# Patient Record
Sex: FEMALE | Race: WHITE | NOT HISPANIC OR LATINO | Employment: FULL TIME | URBAN - NONMETROPOLITAN AREA
[De-identification: names, ages, dates, MRNs, and addresses within clinical notes are randomized per-mention and may not be internally consistent; named-entity substitution may affect disease eponyms.]

---

## 2023-02-04 PROBLEM — R53.83 FATIGUE: Status: ACTIVE | Noted: 2023-02-04

## 2023-02-04 PROBLEM — I10 BENIGN ESSENTIAL HYPERTENSION: Status: ACTIVE | Noted: 2023-02-04

## 2023-02-04 PROBLEM — M25.50 JOINT PAIN: Status: ACTIVE | Noted: 2023-02-04

## 2023-02-04 PROBLEM — R10.9 FLANK PAIN: Status: ACTIVE | Noted: 2023-02-04

## 2023-02-04 PROBLEM — M47.814 THORACIC SPONDYLOSIS: Status: ACTIVE | Noted: 2023-02-04

## 2023-02-04 PROBLEM — G47.33 OSA (OBSTRUCTIVE SLEEP APNEA): Status: ACTIVE | Noted: 2023-02-04

## 2023-02-04 PROBLEM — F41.8 DEPRESSION WITH ANXIETY: Status: ACTIVE | Noted: 2023-02-04

## 2023-02-04 PROBLEM — R06.02 SHORTNESS OF BREATH ON EXERTION: Status: ACTIVE | Noted: 2023-02-04

## 2023-02-04 PROBLEM — G47.00 INSOMNIA: Status: ACTIVE | Noted: 2023-02-04

## 2023-02-04 PROBLEM — M54.9 BACK PAIN: Status: ACTIVE | Noted: 2023-02-04

## 2023-02-04 PROBLEM — R29.898 LEG WEAKNESS: Status: ACTIVE | Noted: 2023-02-04

## 2023-02-04 PROBLEM — K57.32 DIVERTICULITIS, COLON: Status: ACTIVE | Noted: 2023-02-04

## 2023-02-04 PROBLEM — G95.9 CERVICAL MYELOPATHY (MULTI): Status: ACTIVE | Noted: 2023-02-04

## 2023-02-04 PROBLEM — N95.1 HOT FLASH, MENOPAUSAL: Status: ACTIVE | Noted: 2023-02-04

## 2023-02-04 PROBLEM — S80.02XA CONTUSION OF LEFT KNEE: Status: ACTIVE | Noted: 2023-02-04

## 2023-02-04 PROBLEM — M48.02 CERVICAL SPINAL STENOSIS: Status: ACTIVE | Noted: 2023-02-04

## 2023-02-04 PROBLEM — M43.12 SPONDYLOLISTHESIS OF CERVICAL REGION: Status: ACTIVE | Noted: 2023-02-04

## 2023-02-04 PROBLEM — M48.00 SPINAL STENOSIS: Status: ACTIVE | Noted: 2023-02-04

## 2023-02-04 PROBLEM — R51.9 HEADACHE: Status: ACTIVE | Noted: 2023-02-04

## 2023-02-04 PROBLEM — B02.9 SHINGLES: Status: ACTIVE | Noted: 2023-02-04

## 2023-02-04 PROBLEM — R42 DIZZINESS: Status: ACTIVE | Noted: 2023-02-04

## 2023-02-04 PROBLEM — J32.9 SINUSITIS: Status: ACTIVE | Noted: 2023-02-04

## 2023-02-04 PROBLEM — Z98.1 STATUS POST CERVICAL SPINAL FUSION: Status: ACTIVE | Noted: 2023-02-04

## 2023-02-04 PROBLEM — M50.30 BULGING OF CERVICAL INTERVERTEBRAL DISC: Status: ACTIVE | Noted: 2023-02-04

## 2023-02-04 PROBLEM — M54.2 NECK PAIN: Status: ACTIVE | Noted: 2023-02-04

## 2023-02-04 PROBLEM — M79.606 LEG PAIN: Status: ACTIVE | Noted: 2023-02-04

## 2023-02-04 PROBLEM — M47.817 LUMBOSACRAL SPONDYLOSIS: Status: ACTIVE | Noted: 2023-02-04

## 2023-02-04 PROBLEM — R31.9 HEMATURIA: Status: ACTIVE | Noted: 2023-02-04

## 2023-02-04 PROBLEM — K21.9 GERD (GASTROESOPHAGEAL REFLUX DISEASE): Status: ACTIVE | Noted: 2023-02-04

## 2023-02-04 PROBLEM — M79.7 FIBROMYALGIA: Status: ACTIVE | Noted: 2023-02-04

## 2023-02-04 PROBLEM — E55.9 VITAMIN D DEFICIENCY: Status: ACTIVE | Noted: 2023-02-04

## 2023-02-04 PROBLEM — H81.10 BPPV (BENIGN PAROXYSMAL POSITIONAL VERTIGO): Status: ACTIVE | Noted: 2023-02-04

## 2023-02-04 PROBLEM — M51.34 BULGING OF THORACIC INTERVERTEBRAL DISC: Status: ACTIVE | Noted: 2023-02-04

## 2023-02-04 PROBLEM — R00.2 PALPITATIONS: Status: ACTIVE | Noted: 2023-02-04

## 2023-02-04 PROBLEM — G89.18 POST-OP PAIN: Status: ACTIVE | Noted: 2023-02-04

## 2023-02-04 PROBLEM — R19.7 DIARRHEA: Status: ACTIVE | Noted: 2023-02-04

## 2023-02-04 PROBLEM — E66.811 CLASS 1 OBESITY WITH BODY MASS INDEX (BMI) OF 30.0 TO 30.9 IN ADULT: Status: ACTIVE | Noted: 2023-02-04

## 2023-02-04 PROBLEM — E78.00 HYPERCHOLESTEROLEMIA: Status: ACTIVE | Noted: 2023-02-04

## 2023-02-04 PROBLEM — M54.81 BILATERAL OCCIPITAL NEURALGIA: Status: ACTIVE | Noted: 2023-02-04

## 2023-02-04 PROBLEM — N39.0 UTI (URINARY TRACT INFECTION): Status: ACTIVE | Noted: 2023-02-04

## 2023-02-04 PROBLEM — M51.9 DISC DISORDER: Status: ACTIVE | Noted: 2023-02-04

## 2023-02-04 PROBLEM — M54.12 CERVICAL RADICULOPATHY, CHRONIC: Status: ACTIVE | Noted: 2023-02-04

## 2023-02-04 PROBLEM — E66.9 CLASS 1 OBESITY WITH BODY MASS INDEX (BMI) OF 30.0 TO 30.9 IN ADULT: Status: ACTIVE | Noted: 2023-02-04

## 2023-02-04 PROBLEM — E03.9 HYPOTHYROIDISM: Status: ACTIVE | Noted: 2023-02-04

## 2023-02-04 PROBLEM — R07.9 CHEST PAIN: Status: ACTIVE | Noted: 2023-02-04

## 2023-02-04 PROBLEM — M47.812 SPONDYLOSIS OF CERVICAL REGION WITHOUT MYELOPATHY OR RADICULOPATHY: Status: ACTIVE | Noted: 2023-02-04

## 2023-02-04 PROBLEM — B37.9 CANDIDA INFECTION: Status: ACTIVE | Noted: 2023-02-04

## 2023-02-04 PROBLEM — F41.9 ANXIETY: Status: ACTIVE | Noted: 2023-02-04

## 2023-02-04 PROBLEM — M54.6 THORACIC BACK PAIN: Status: ACTIVE | Noted: 2023-02-04

## 2023-02-04 RX ORDER — HYDROCHLOROTHIAZIDE 12.5 MG/1
1 TABLET ORAL DAILY PRN
COMMUNITY
Start: 2020-08-11 | End: 2023-10-25 | Stop reason: SDUPTHER

## 2023-02-04 RX ORDER — ATORVASTATIN CALCIUM 40 MG/1
80 TABLET, FILM COATED ORAL DAILY
COMMUNITY
Start: 2020-01-30 | End: 2023-10-03 | Stop reason: DRUGHIGH

## 2023-02-04 RX ORDER — TIZANIDINE 4 MG/1
1-2 TABLET ORAL EVERY 8 HOURS PRN
COMMUNITY
Start: 2021-10-14 | End: 2023-03-28 | Stop reason: SDUPTHER

## 2023-02-04 RX ORDER — LOSARTAN POTASSIUM 50 MG/1
50 TABLET ORAL DAILY
COMMUNITY
Start: 2020-08-11 | End: 2023-10-25 | Stop reason: SDUPTHER

## 2023-02-04 RX ORDER — ASPIRIN 81 MG/1
1 TABLET ORAL DAILY
COMMUNITY
Start: 2020-03-10

## 2023-02-04 RX ORDER — OMEPRAZOLE 40 MG/1
1 CAPSULE, DELAYED RELEASE ORAL DAILY
COMMUNITY
Start: 2020-01-30 | End: 2023-09-26 | Stop reason: ALTCHOICE

## 2023-02-04 RX ORDER — LEVOTHYROXINE SODIUM 88 UG/1
1 TABLET ORAL DAILY
COMMUNITY
End: 2023-10-25 | Stop reason: SDUPTHER

## 2023-02-04 RX ORDER — LIDOCAINE 50 MG/G
PATCH TOPICAL
COMMUNITY
Start: 2022-03-15 | End: 2023-09-26 | Stop reason: ALTCHOICE

## 2023-02-04 RX ORDER — TRAMADOL HYDROCHLORIDE 50 MG/1
50 TABLET ORAL 3 TIMES DAILY PRN
COMMUNITY
Start: 2021-10-15 | End: 2023-09-26 | Stop reason: ALTCHOICE

## 2023-02-04 RX ORDER — GABAPENTIN 300 MG/1
600 CAPSULE ORAL NIGHTLY
COMMUNITY
Start: 2021-09-30 | End: 2023-12-05 | Stop reason: SDUPTHER

## 2023-03-28 ENCOUNTER — OFFICE VISIT (OUTPATIENT)
Dept: PRIMARY CARE | Facility: CLINIC | Age: 63
End: 2023-03-28
Payer: COMMERCIAL

## 2023-03-28 VITALS
HEART RATE: 72 BPM | HEIGHT: 62 IN | DIASTOLIC BLOOD PRESSURE: 80 MMHG | SYSTOLIC BLOOD PRESSURE: 120 MMHG | BODY MASS INDEX: 30.18 KG/M2 | WEIGHT: 164 LBS

## 2023-03-28 DIAGNOSIS — M54.12 CERVICAL RADICULOPATHY, CHRONIC: Primary | ICD-10-CM

## 2023-03-28 DIAGNOSIS — I10 BENIGN ESSENTIAL HYPERTENSION: ICD-10-CM

## 2023-03-28 DIAGNOSIS — G95.9 CERVICAL MYELOPATHY (MULTI): ICD-10-CM

## 2023-03-28 DIAGNOSIS — R53.82 CHRONIC FATIGUE: ICD-10-CM

## 2023-03-28 DIAGNOSIS — M48.02 CERVICAL SPINAL STENOSIS: ICD-10-CM

## 2023-03-28 DIAGNOSIS — F41.8 DEPRESSION WITH ANXIETY: ICD-10-CM

## 2023-03-28 PROBLEM — M50.30 DEGENERATION OF CERVICAL INTERVERTEBRAL DISC: Status: ACTIVE | Noted: 2023-03-28

## 2023-03-28 PROBLEM — M47.12 CERVICAL ARTHRITIS WITH MYELOPATHY: Status: ACTIVE | Noted: 2021-12-28

## 2023-03-28 PROCEDURE — 99214 OFFICE O/P EST MOD 30 MIN: CPT | Performed by: INTERNAL MEDICINE

## 2023-03-28 PROCEDURE — 3074F SYST BP LT 130 MM HG: CPT | Performed by: INTERNAL MEDICINE

## 2023-03-28 PROCEDURE — 1036F TOBACCO NON-USER: CPT | Performed by: INTERNAL MEDICINE

## 2023-03-28 PROCEDURE — 3079F DIAST BP 80-89 MM HG: CPT | Performed by: INTERNAL MEDICINE

## 2023-03-28 RX ORDER — TIZANIDINE 4 MG/1
4-8 TABLET ORAL EVERY 8 HOURS PRN
Qty: 180 TABLET | Refills: 1 | Status: SHIPPED | OUTPATIENT
Start: 2023-03-28 | End: 2023-09-26 | Stop reason: ALTCHOICE

## 2023-03-28 RX ORDER — MILNACIPRAN HYDROCHLORIDE 12.5-25-5
KIT ORAL
Qty: 42 EACH | Refills: 0 | Status: SHIPPED | OUTPATIENT
Start: 2023-03-28 | End: 2023-04-26 | Stop reason: SDUPTHER

## 2023-03-28 ASSESSMENT — ENCOUNTER SYMPTOMS
APPETITE CHANGE: 0
ACTIVITY CHANGE: 0
BACK PAIN: 1
ARTHRALGIAS: 1
WEAKNESS: 1
NUMBNESS: 1

## 2023-03-28 ASSESSMENT — PATIENT HEALTH QUESTIONNAIRE - PHQ9
2. FEELING DOWN, DEPRESSED OR HOPELESS: SEVERAL DAYS
1. LITTLE INTEREST OR PLEASURE IN DOING THINGS: SEVERAL DAYS
SUM OF ALL RESPONSES TO PHQ9 QUESTIONS 1 AND 2: 2

## 2023-03-28 NOTE — PROGRESS NOTES
"Subjective   Patient ID: Turandy Mcguire is a 62 y.o. female who presents for Follow-up (6 month/+Mammo screen?) and Anxiety (Pt states her anxiety has been through the roof/Feels aggravated all the time/She states her daughter has noticed it/Unsure if its because of all the pain she is in/She has started getting pain in her shoulders and hips).    Anxiety          Patient is here today for 6 mo follow up   Patient is continuing to see pain management and neurosurgery.    She was recommended to have injections however she cannot afford the injections. She is just trying to plug along however she has a physically demanding job.   Patient states neurosurgery did recommend that she have another cervical spine surgery.  I am just worried that the longer she pushes through if she is will have more longterm damage and diminished quality of life after she either can retire or is disabled.     Is having more anxiety she thinks more from being in pain constantly. She has tried cymbalta or amitryraline without much improvement, could try savella.       Review of Systems   Constitutional:  Negative for activity change and appetite change.   HENT:  Negative for congestion, mouth sores and nosebleeds.    Musculoskeletal:  Positive for arthralgias and back pain.   Neurological:  Positive for weakness and numbness.       Objective   /80 (BP Location: Right arm, Patient Position: Sitting, BP Cuff Size: Adult)   Pulse 72   Ht 1.575 m (5' 2\")   Wt 74.4 kg (164 lb)   BMI 30.00 kg/m²     Physical Exam  Constitutional:       General: She is not in acute distress.     Appearance: Normal appearance. She is not toxic-appearing.   HENT:      Head: Normocephalic and atraumatic.      Right Ear: Tympanic membrane, ear canal and external ear normal.      Left Ear: Tympanic membrane, ear canal and external ear normal.      Nose: Nose normal.      Mouth/Throat:      Mouth: Mucous membranes are moist.      Pharynx: Oropharynx is " clear.   Eyes:      Extraocular Movements: Extraocular movements intact.      Conjunctiva/sclera: Conjunctivae normal.      Pupils: Pupils are equal, round, and reactive to light.   Cardiovascular:      Rate and Rhythm: Normal rate and regular rhythm.      Heart sounds: No murmur heard.     No friction rub. No gallop.   Pulmonary:      Effort: Pulmonary effort is normal.      Breath sounds: Normal breath sounds.   Abdominal:      General: Bowel sounds are normal. There is no distension.      Palpations: Abdomen is soft. There is no mass.      Tenderness: There is no abdominal tenderness. There is no guarding.   Musculoskeletal:         General: No swelling. Normal range of motion.      Cervical back: Normal range of motion.   Skin:     General: Skin is warm and dry.   Neurological:      General: No focal deficit present.      Mental Status: She is alert and oriented to person, place, and time.   Psychiatric:         Mood and Affect: Mood normal.         Thought Content: Thought content normal.         Judgment: Judgment normal.           Assessment/Plan   Problem List Items Addressed This Visit          Nervous    Cervical myelopathy (CMS/HCC)    Cervical radiculopathy, chronic - Primary    Relevant Medications    tiZANidine (Zanaflex) 4 mg tablet    milnacipran (Savella) 12.5 mg (5)-25 mg(8)-50 mg(42) tablets,dose pack    Cervical spinal stenosis     - following Neurosurgery and Pain Management   - off tramadol and gabapentin because was not helping, Lyrica no change   - Neurosurgery recommended repeat cervical surgery  - will try savella             Circulatory    Benign essential hypertension     - continue losartan 50mg po daily   - continue 12.5mg po daily             Other    Depression with anxiety     - will try savella          Fatigue    Relevant Orders    Comprehensive Metabolic Panel    Vitamin D 25-Hydroxy,Total    Vitamin B12    CBC and Auto Differential     Discussed with pt that she may need to look  into disability, just worry that the longer she works and puts this off she is going to end up with more damage down the road. May need to look into cortisone injections or Ablation with Dr Benitez if she wants to put off surgery.   Final diagnoses:   [M54.12] Cervical radiculopathy, chronic   [M48.02] Cervical spinal stenosis   [G95.9] Cervical myelopathy (CMS/HCC)   [R53.82] Chronic fatigue   [F41.8] Depression with anxiety   [I10] Benign essential hypertension

## 2023-03-28 NOTE — ASSESSMENT & PLAN NOTE
- following Neurosurgery and Pain Management   - off tramadol and gabapentin because was not helping, Lyrica no change   - Neurosurgery recommended repeat cervical surgery  - will try savella

## 2023-04-26 DIAGNOSIS — M54.81 BILATERAL OCCIPITAL NEURALGIA: Primary | ICD-10-CM

## 2023-04-26 DIAGNOSIS — M54.12 CERVICAL RADICULOPATHY, CHRONIC: ICD-10-CM

## 2023-04-26 RX ORDER — MILNACIPRAN HYDROCHLORIDE 12.5-25-5
KIT ORAL
Qty: 55 EACH | Refills: 0 | Status: SHIPPED | OUTPATIENT
Start: 2023-04-26 | End: 2023-05-30 | Stop reason: ALTCHOICE

## 2023-04-27 ENCOUNTER — PATIENT OUTREACH (OUTPATIENT)
Dept: CARE COORDINATION | Facility: CLINIC | Age: 63
End: 2023-04-27
Payer: COMMERCIAL

## 2023-04-27 NOTE — PROGRESS NOTES
[facility]  outreach.    Introduced myself and purpose of call.  Confirmed : Yes  No new or worsening symptoms  No Rx given at discharge, was given Toradol in ED, didn't help much. Was concerned she may have been having a cardiac emergence, two sisters have had heart attacks.  Back surgery in past, told will need more.  Has not experienced any barriers filling scripts. Has appointments coming up with PCP, Ortho and Pain Management.  No questions or concerns at this time.  Is engaged with [employer].  No assistance provided.  CASI

## 2023-05-03 ENCOUNTER — OFFICE VISIT (OUTPATIENT)
Dept: PRIMARY CARE | Facility: CLINIC | Age: 63
End: 2023-05-03
Payer: COMMERCIAL

## 2023-05-03 VITALS
SYSTOLIC BLOOD PRESSURE: 135 MMHG | HEIGHT: 62 IN | HEART RATE: 96 BPM | BODY MASS INDEX: 30.36 KG/M2 | WEIGHT: 165 LBS | DIASTOLIC BLOOD PRESSURE: 85 MMHG

## 2023-05-03 DIAGNOSIS — I10 BENIGN ESSENTIAL HYPERTENSION: ICD-10-CM

## 2023-05-03 DIAGNOSIS — Z98.1 STATUS POST CERVICAL SPINAL FUSION: ICD-10-CM

## 2023-05-03 DIAGNOSIS — R00.2 PALPITATIONS: Primary | ICD-10-CM

## 2023-05-03 DIAGNOSIS — R07.9 CHEST PAIN, UNSPECIFIED TYPE: ICD-10-CM

## 2023-05-03 DIAGNOSIS — M54.12 CERVICAL RADICULOPATHY, CHRONIC: ICD-10-CM

## 2023-05-03 PROCEDURE — 3075F SYST BP GE 130 - 139MM HG: CPT | Performed by: INTERNAL MEDICINE

## 2023-05-03 PROCEDURE — 99214 OFFICE O/P EST MOD 30 MIN: CPT | Performed by: INTERNAL MEDICINE

## 2023-05-03 PROCEDURE — 1036F TOBACCO NON-USER: CPT | Performed by: INTERNAL MEDICINE

## 2023-05-03 PROCEDURE — 3079F DIAST BP 80-89 MM HG: CPT | Performed by: INTERNAL MEDICINE

## 2023-05-03 RX ORDER — MILNACIPRAN HYDROCHLORIDE 12.5-25-5
50 KIT ORAL 2 TIMES DAILY
COMMUNITY
End: 2023-05-30 | Stop reason: ALTCHOICE

## 2023-05-03 ASSESSMENT — ENCOUNTER SYMPTOMS
SHORTNESS OF BREATH: 0
DIARRHEA: 0
NUMBNESS: 0
BACK PAIN: 1
WHEEZING: 0
SORE THROAT: 0
ACTIVITY CHANGE: 0
NAUSEA: 0
SINUS PAIN: 0
FATIGUE: 1
COUGH: 0
CHILLS: 0
WEAKNESS: 0
ABDOMINAL DISTENTION: 0
APPETITE CHANGE: 0
VOMITING: 0
SINUS PRESSURE: 0

## 2023-05-03 NOTE — PROGRESS NOTES
Subjective   Patient ID: Tuesday BISI Mcguire is a 62 y.o. female who presents for ER Follow-up (Hypertension; back pain/Last Saturday upper back pain started radiating into her chest/Was told to see her PCP and get a stress test scheduled (cannot do 6 minute walk testper patient)/She does report her heart racing; patient reports her heart being up as high as 122 resting /149/86/132/104 84/158/100 104/198/120 78 this morning/164/110 93 after be meds today) and Fatigue (Present for awhile now; no energy to do anything ).  ER Follow-up  Associated symptoms include chest pain and fatigue. Pertinent negatives include no chills, congestion, coughing, nausea, numbness, sore throat, vomiting or weakness.   Fatigue  Associated symptoms include chest pain and fatigue. Pertinent negatives include no chills, congestion, coughing, nausea, numbness, sore throat, vomiting or weakness.     Patient is here today for ED follow up   Patient reports that she had pain radiating into her mid back and came through to her chest and it was unbearable, went to the ED, had EKG and CT scan, everything was ok. Advised her to look into doing a stress test.   She has also noticed that her HR has been higher, up to 122.   Blood pressures also higher though she she is still of course in significiant pain.   Also complaining of more fatigue.     Review of Systems   Constitutional:  Positive for fatigue. Negative for activity change, appetite change and chills.   HENT:  Negative for congestion, postnasal drip, sinus pressure, sinus pain and sore throat.    Respiratory:  Negative for cough, shortness of breath and wheezing.    Cardiovascular:  Positive for chest pain. Negative for leg swelling.   Gastrointestinal:  Negative for abdominal distention, diarrhea, nausea and vomiting.   Musculoskeletal:  Positive for back pain.   Neurological:  Negative for weakness and numbness.       Objective   /85 (BP Location: Left arm, Patient Position: Sitting,  "BP Cuff Size: Adult)   Pulse 96   Ht 1.575 m (5' 2\")   Wt 74.8 kg (165 lb)   BMI 30.18 kg/m²     Physical Exam  Constitutional:       General: She is not in acute distress.     Appearance: Normal appearance.   HENT:      Head: Normocephalic.      Nose: Nose normal.      Mouth/Throat:      Mouth: Mucous membranes are dry.      Pharynx: No oropharyngeal exudate.   Eyes:      General:         Right eye: No discharge.         Left eye: No discharge.      Extraocular Movements: Extraocular movements intact.      Pupils: Pupils are equal, round, and reactive to light.   Cardiovascular:      Rate and Rhythm: Normal rate and regular rhythm.      Heart sounds: No murmur heard.     No gallop.   Pulmonary:      Effort: Pulmonary effort is normal. No respiratory distress.      Breath sounds: Normal breath sounds. No wheezing.   Musculoskeletal:         General: No swelling. Normal range of motion.   Skin:     General: Skin is warm and dry.      Coloration: Skin is not jaundiced.   Neurological:      General: No focal deficit present.      Mental Status: She is alert and oriented to person, place, and time.      Cranial Nerves: No cranial nerve deficit.   Psychiatric:         Mood and Affect: Mood normal.         Behavior: Behavior normal.       Interpreted By:  FRED ZARAGOZA MD  MRN: [id number]  Patient Name: AMY MANTILLA     STUDY:  CT ANGIO CHEST FOR PE;  4/26/2023 2:26 pm     INDICATION:  cp .     COMPARISON:  06/25/2021     ACCESSION NUMBER(S):  [id number]     ORDERING CLINICIAN:  VENU RIBERA     TECHNIQUE:  CT of the chest was performed. Sagittal and coronal reconstructions  were generated. 60 milliliter OMNIPAQUE 350 intravenous contrast  given for the examination.  3D reconstructions were created on an  independent workstation and provided for review.     FINDINGS:        CHEST WALL AND LOWER NECK: Metallic streak artifact from fusion  hardware in the lower cervical spine on the most superior images.  This " limits assessment of adjacent structures. No significant  axillary lymphadenopathy as visualized. Portions of the lateral chest  wall excluded from field of imaging.     MEDIASTINUM AND KONSTANTIN:  Nodes measuring up to 1 cm in each hilum  similar to the previous exam. No significant mediastinal adenopathy.     HEART AND VESSELS:  No pulmonary artery filling defect to suggest PE.  The heart is normal in size. No significant pericardial effusion. No  thoracic aortic aneurysm. Atherosclerotic calcifications including  the coronary arteries.     LUNGS, PLEURA, LARGE AIRWAYS:  Moderate diffuse pulmonary  heterogeneity/mosaic attenuation similar to the prior exam.  Pleural-based opacities in the anteromedial right middle lobe similar  to the prior exam for example image 84/127. No significant pleural  effusion. The central airways are patent.     UPPER ABDOMEN:  Status post cholecystectomy. Dilatation of the  included extrahepatic biliary tree similar to the previous exam.     BONES:  Degenerative endplate spurring in the thoracic spine.     IMPRESSION:  No evidence of PE.     Pulmonary heterogeneity, possible mosaic attenuation, which can be  seen with small airways disease and air trapping versus acute or  chronic interstitial infiltrates, similar to 06/25/2021.  Pleural-based densities in the anterior right chest and borderline  enlarged bilateral hilar lymph nodes also similar to the prior study.    Assessment/Plan   Problem List Items Addressed This Visit          Nervous    Cervical radiculopathy, chronic    Status post cervical spinal fusion       Circulatory    Benign essential hypertension    Palpitations - Primary     Worsening chest pain, tachycardia   - last stress test was in 7/21 and was normal  - reports that she is under increasing stress at work and doing more physical labor with a lot of heavy lifting   - tachycardia >100   - will order 3-5 day holter   -echocardiogram  - can follow up with cardiology to  discuss having another nuclear stress test, could not walk on treadmill with back issues     2. Cervical spinal stenosis, chronic radiculopathy   - following with neurosurgery and pain management   - on savella   - off tramadol, gabapentin     Again had discussed regarding short term disability, I do think her physical job is exacerbating her chronic back issues, she is unable to financially retire and disability would take some time, discussed  short term disability for next month pt agreeable Given work note. She is unable to lift, bend, twist, etc with her cervical spinal stenosos and other chronic spinal issues that are progressively worsening.   Final diagnoses:   [R00.2] Palpitations   [Z98.1] Status post cervical spinal fusion   [M54.12] Cervical radiculopathy, chronic   [I10] Benign essential hypertension

## 2023-05-11 LAB
6-ACETYLMORPHINE: <25 NG/ML
7-AMINOCLONAZEPAM: <25 NG/ML
ALPHA-HYDROXYALPRAZOLAM: <25 NG/ML
ALPHA-HYDROXYMIDAZOLAM: <25 NG/ML
ALPRAZOLAM: <25 NG/ML
AMPHETAMINE (PRESENCE) IN URINE BY SCREEN METHOD: ABNORMAL
BARBITURATES PRESENCE IN URINE BY SCREEN METHOD: ABNORMAL
CANNABINOIDS IN URINE BY SCREEN METHOD: ABNORMAL
CHLORDIAZEPOXIDE: <25 NG/ML
CLONAZEPAM: <25 NG/ML
COCAINE (PRESENCE) IN URINE BY SCREEN METHOD: ABNORMAL
CODEINE: <50 NG/ML
CREATINE, URINE FOR DRUG: 25.5 MG/DL
DIAZEPAM: <25 NG/ML
DRUG SCREEN COMMENT URINE: ABNORMAL
EDDP: <25 NG/ML
FENTANYL CONFIRMATION, URINE: <2.5 NG/ML
HYDROCODONE: <25 NG/ML
HYDROMORPHONE: <25 NG/ML
LORAZEPAM: <25 NG/ML
METHADONE CONFIRMATION,URINE: <25 NG/ML
MIDAZOLAM: <25 NG/ML
MORPHINE URINE: <50 NG/ML
NORDIAZEPAM: <25 NG/ML
NORFENTANYL: <2.5 NG/ML
NORHYDROCODONE: <25 NG/ML
NOROXYCODONE: <25 NG/ML
O-DESMETHYLTRAMADOL: >1000 NG/ML
OXAZEPAM: <25 NG/ML
OXYCODONE: <25 NG/ML
OXYMORPHONE: <25 NG/ML
PHENCYCLIDINE (PRESENCE) IN URINE BY SCREEN METHOD: ABNORMAL
TEMAZEPAM: <25 NG/ML
TRAMADOL: >1000 NG/ML
ZOLPIDEM METABOLITE (ZCA): <25 NG/ML
ZOLPIDEM: <25 NG/ML

## 2023-05-30 ENCOUNTER — OFFICE VISIT (OUTPATIENT)
Dept: PRIMARY CARE | Facility: CLINIC | Age: 63
End: 2023-05-30
Payer: COMMERCIAL

## 2023-05-30 VITALS
SYSTOLIC BLOOD PRESSURE: 141 MMHG | DIASTOLIC BLOOD PRESSURE: 86 MMHG | HEIGHT: 62 IN | HEART RATE: 84 BPM | WEIGHT: 163 LBS | BODY MASS INDEX: 30 KG/M2

## 2023-05-30 DIAGNOSIS — F41.8 DEPRESSION WITH ANXIETY: ICD-10-CM

## 2023-05-30 DIAGNOSIS — Z98.1 STATUS POST CERVICAL SPINAL FUSION: Primary | ICD-10-CM

## 2023-05-30 DIAGNOSIS — R07.9 CHEST PAIN, UNSPECIFIED TYPE: ICD-10-CM

## 2023-05-30 DIAGNOSIS — M48.02 CERVICAL SPINAL STENOSIS: ICD-10-CM

## 2023-05-30 PROCEDURE — 1036F TOBACCO NON-USER: CPT | Performed by: INTERNAL MEDICINE

## 2023-05-30 PROCEDURE — 99214 OFFICE O/P EST MOD 30 MIN: CPT | Performed by: INTERNAL MEDICINE

## 2023-05-30 PROCEDURE — 3079F DIAST BP 80-89 MM HG: CPT | Performed by: INTERNAL MEDICINE

## 2023-05-30 PROCEDURE — 3077F SYST BP >= 140 MM HG: CPT | Performed by: INTERNAL MEDICINE

## 2023-05-30 RX ORDER — MILNACIPRAN HYDROCHLORIDE 50 MG/1
1 TABLET, FILM COATED ORAL 2 TIMES DAILY
COMMUNITY
Start: 2023-04-26 | End: 2023-11-21 | Stop reason: ALTCHOICE

## 2023-05-30 ASSESSMENT — ENCOUNTER SYMPTOMS
PALPITATIONS: 1
BACK PAIN: 1

## 2023-05-30 NOTE — PROGRESS NOTES
"Subjective   Patient ID: Turandy Mcguier is a 63 y.o. female who presents for Letter for School/Work (Would like to extend her work [employer]).  HPI  Patient is here today for follow up  Patient reports that her back pain is improving not working and not doing so much physical labor.   Patient did see cardiologist and has cardiac testing scheduled as well as pulmonary testing.   Discussed extending her leave to allow her to both continue to improve and to complete testing and make sure chest pain improves.     Review of Systems   Cardiovascular:  Positive for chest pain and palpitations.   Musculoskeletal:  Positive for back pain.       Objective   /86 (BP Location: Right arm, Patient Position: Sitting, BP Cuff Size: Adult)   Pulse 84   Ht 1.575 m (5' 2\")   Wt 73.9 kg (163 lb)   BMI 29.81 kg/m²     Physical Exam  Constitutional:       Appearance: Normal appearance.   Neurological:      Mental Status: She is alert.   Psychiatric:         Mood and Affect: Mood normal.         Behavior: Behavior normal.         Thought Content: Thought content normal.           Assessment/Plan   Problem List Items Addressed This Visit          Nervous    Cervical spinal stenosis    Status post cervical spinal fusion - Primary       Other    Depression with anxiety   Worsening chest pain, tachycardia, has had less frequent episodes   - last stress test was in 7/21 and was normal  - reports that she is under increasing stress at work and doing more physical labor with a lot of heavy lifting   - tachycardia >100   - had echocardiogram, EF 65-70%, impaired relaxation patter of left vetricular diastolic filling, bubble study positive for  right to left shunt  - did see cardiologist and is scheduled for cardiac cath and pulmonary testing including PFTs and sleep study      2. Cervical spinal stenosis, chronic radiculopathy   - following with neurosurgery and pain management   - on savella   - off tramadol, gabapentin      3. Extending " leave of absence due to above medical issues until 7/12/2023.     Again had discussed regarding short term disability, I do think her physical job is exacerbating her chronic back issues, she is unable to financially retire and disability would take some time, discussed  short term disability for next month pt agreeable Given work note. She is unable to lift, bend, twist, etc with her cervical spinal stenosos and other chronic spinal issues that are progressively worsening.     Final diagnoses:   [Z98.1] Status post cervical spinal fusion   [M48.02] Cervical spinal stenosis   [F41.8] Depression with anxiety

## 2023-05-30 NOTE — LETTER
May 30, 2023     Patient: Julia Mcguire   YOB: 1960   Date of Visit: 5/30/2023       To Whom It May Concern:    Julia Mcguire was seen in my clinic on 5/30/2023 at 9:40 am.   Extending pts leave of absence from work until 7/12/2023.     If you have any questions or concerns, please don't hesitate to call.         Sincerely,         Manisha Singh DO        CC: No Recipients

## 2023-06-05 LAB
ANION GAP IN SER/PLAS: 12 MMOL/L (ref 10–20)
BASOPHILS (10*3/UL) IN BLOOD BY AUTOMATED COUNT: 0.05 X10E9/L (ref 0–0.1)
BASOPHILS/100 LEUKOCYTES IN BLOOD BY AUTOMATED COUNT: 0.7 % (ref 0–2)
CALCIUM (MG/DL) IN SER/PLAS: 9.5 MG/DL (ref 8.6–10.3)
CARBON DIOXIDE, TOTAL (MMOL/L) IN SER/PLAS: 26 MMOL/L (ref 21–32)
CHLORIDE (MMOL/L) IN SER/PLAS: 102 MMOL/L (ref 98–107)
CREATININE (MG/DL) IN SER/PLAS: 1.2 MG/DL (ref 0.5–1.05)
EOSINOPHILS (10*3/UL) IN BLOOD BY AUTOMATED COUNT: 0.13 X10E9/L (ref 0–0.7)
EOSINOPHILS/100 LEUKOCYTES IN BLOOD BY AUTOMATED COUNT: 1.7 % (ref 0–6)
ERYTHROCYTE DISTRIBUTION WIDTH (RATIO) BY AUTOMATED COUNT: 13.9 % (ref 11.5–14.5)
ERYTHROCYTE MEAN CORPUSCULAR HEMOGLOBIN CONCENTRATION (G/DL) BY AUTOMATED: 33 G/DL (ref 32–36)
ERYTHROCYTE MEAN CORPUSCULAR VOLUME (FL) BY AUTOMATED COUNT: 91 FL (ref 80–100)
ERYTHROCYTES (10*6/UL) IN BLOOD BY AUTOMATED COUNT: 4.53 X10E12/L (ref 4–5.2)
GFR FEMALE: 51 ML/MIN/1.73M2
GLUCOSE (MG/DL) IN SER/PLAS: 144 MG/DL (ref 74–99)
HEMATOCRIT (%) IN BLOOD BY AUTOMATED COUNT: 41.2 % (ref 36–46)
HEMOGLOBIN (G/DL) IN BLOOD: 13.6 G/DL (ref 12–16)
IMMATURE GRANULOCYTES/100 LEUKOCYTES IN BLOOD BY AUTOMATED COUNT: 0.3 % (ref 0–0.9)
LEUKOCYTES (10*3/UL) IN BLOOD BY AUTOMATED COUNT: 7.4 X10E9/L (ref 4.4–11.3)
LYMPHOCYTES (10*3/UL) IN BLOOD BY AUTOMATED COUNT: 2.64 X10E9/L (ref 1.2–4.8)
LYMPHOCYTES/100 LEUKOCYTES IN BLOOD BY AUTOMATED COUNT: 35.5 % (ref 13–44)
MONOCYTES (10*3/UL) IN BLOOD BY AUTOMATED COUNT: 0.55 X10E9/L (ref 0.1–1)
MONOCYTES/100 LEUKOCYTES IN BLOOD BY AUTOMATED COUNT: 7.4 % (ref 2–10)
NEUTROPHILS (10*3/UL) IN BLOOD BY AUTOMATED COUNT: 4.04 X10E9/L (ref 1.2–7.7)
NEUTROPHILS/100 LEUKOCYTES IN BLOOD BY AUTOMATED COUNT: 54.4 % (ref 40–80)
PLATELETS (10*3/UL) IN BLOOD AUTOMATED COUNT: 297 X10E9/L (ref 150–450)
POTASSIUM (MMOL/L) IN SER/PLAS: 3.8 MMOL/L (ref 3.5–5.3)
SODIUM (MMOL/L) IN SER/PLAS: 136 MMOL/L (ref 136–145)
UREA NITROGEN (MG/DL) IN SER/PLAS: 27 MG/DL (ref 6–23)

## 2023-06-08 ENCOUNTER — HOSPITAL ENCOUNTER (OUTPATIENT)
Dept: DATA CONVERSION | Facility: HOSPITAL | Age: 63
End: 2023-06-08
Attending: STUDENT IN AN ORGANIZED HEALTH CARE EDUCATION/TRAINING PROGRAM | Admitting: STUDENT IN AN ORGANIZED HEALTH CARE EDUCATION/TRAINING PROGRAM
Payer: COMMERCIAL

## 2023-06-08 DIAGNOSIS — E78.5 HYPERLIPIDEMIA, UNSPECIFIED: ICD-10-CM

## 2023-06-08 DIAGNOSIS — I25.110 ATHEROSCLEROTIC HEART DISEASE OF NATIVE CORONARY ARTERY WITH UNSTABLE ANGINA PECTORIS (MULTI): ICD-10-CM

## 2023-06-08 DIAGNOSIS — I10 ESSENTIAL (PRIMARY) HYPERTENSION: ICD-10-CM

## 2023-06-08 DIAGNOSIS — R06.02 SHORTNESS OF BREATH: ICD-10-CM

## 2023-06-08 DIAGNOSIS — I25.10 ATHEROSCLEROTIC HEART DISEASE OF NATIVE CORONARY ARTERY WITHOUT ANGINA PECTORIS: ICD-10-CM

## 2023-06-12 LAB
ATRIAL RATE: 69 BPM
P AXIS: 68 DEGREES
P OFFSET: 188 MS
P ONSET: 152 MS
PR INTERVAL: 140 MS
Q ONSET: 222 MS
QRS COUNT: 12 BEATS
QRS DURATION: 74 MS
QT INTERVAL: 398 MS
QTC CALCULATION(BAZETT): 426 MS
QTC FREDERICIA: 417 MS
R AXIS: 46 DEGREES
T AXIS: 44 DEGREES
T OFFSET: 421 MS
VENTRICULAR RATE: 69 BPM

## 2023-06-26 ENCOUNTER — TELEPHONE (OUTPATIENT)
Dept: PRIMARY CARE | Facility: CLINIC | Age: 63
End: 2023-06-26
Payer: COMMERCIAL

## 2023-06-26 NOTE — TELEPHONE ENCOUNTER
Patient asking for work extension due to the pain she is in, [employer] will fax over paperwork

## 2023-06-28 ENCOUNTER — APPOINTMENT (OUTPATIENT)
Dept: PRIMARY CARE | Facility: CLINIC | Age: 63
End: 2023-06-28
Payer: COMMERCIAL

## 2023-07-25 ENCOUNTER — OFFICE VISIT (OUTPATIENT)
Dept: PRIMARY CARE | Facility: CLINIC | Age: 63
End: 2023-07-25
Payer: COMMERCIAL

## 2023-07-25 VITALS
HEART RATE: 101 BPM | DIASTOLIC BLOOD PRESSURE: 83 MMHG | HEIGHT: 62 IN | WEIGHT: 162 LBS | SYSTOLIC BLOOD PRESSURE: 142 MMHG | BODY MASS INDEX: 29.81 KG/M2

## 2023-07-25 DIAGNOSIS — K21.9 GASTROESOPHAGEAL REFLUX DISEASE WITHOUT ESOPHAGITIS: ICD-10-CM

## 2023-07-25 DIAGNOSIS — R06.02 SHORTNESS OF BREATH ON EXERTION: ICD-10-CM

## 2023-07-25 DIAGNOSIS — E78.00 HYPERCHOLESTEROLEMIA: ICD-10-CM

## 2023-07-25 DIAGNOSIS — I10 BENIGN ESSENTIAL HYPERTENSION: ICD-10-CM

## 2023-07-25 DIAGNOSIS — M79.676 PAIN OF TOE, UNSPECIFIED LATERALITY: Primary | ICD-10-CM

## 2023-07-25 DIAGNOSIS — E03.9 ACQUIRED HYPOTHYROIDISM: ICD-10-CM

## 2023-07-25 DIAGNOSIS — F41.8 DEPRESSION WITH ANXIETY: ICD-10-CM

## 2023-07-25 PROCEDURE — 1036F TOBACCO NON-USER: CPT | Performed by: INTERNAL MEDICINE

## 2023-07-25 PROCEDURE — 99213 OFFICE O/P EST LOW 20 MIN: CPT | Performed by: INTERNAL MEDICINE

## 2023-07-25 PROCEDURE — 3077F SYST BP >= 140 MM HG: CPT | Performed by: INTERNAL MEDICINE

## 2023-07-25 PROCEDURE — 3079F DIAST BP 80-89 MM HG: CPT | Performed by: INTERNAL MEDICINE

## 2023-07-25 RX ORDER — PREDNISONE 20 MG/1
40 TABLET ORAL DAILY
Qty: 10 TABLET | Refills: 0 | Status: SHIPPED | OUTPATIENT
Start: 2023-07-25 | End: 2023-09-18 | Stop reason: ENTERED-IN-ERROR

## 2023-07-25 ASSESSMENT — ENCOUNTER SYMPTOMS
SINUS PAIN: 0
VOMITING: 0
WEAKNESS: 0
CHILLS: 0
FATIGUE: 0
ACTIVITY CHANGE: 0
BACK PAIN: 1
SHORTNESS OF BREATH: 0
NAUSEA: 0
COUGH: 0
ABDOMINAL DISTENTION: 0
APPETITE CHANGE: 0
NUMBNESS: 0
SINUS PRESSURE: 0
WHEEZING: 0
SORE THROAT: 0
DIARRHEA: 0

## 2023-07-25 NOTE — PROGRESS NOTES
"Subjective   Patient ID: Turandy Mcguire is a 63 y.o. female who presents for Follow-up (3 month/Pt states she is hurting today) and Foot Pain (LT foot/Concerned about gout).  HPI  Patient is here today for 3 mo follow up   Patient reports that she took out all her garbage out last night, had more than normal, not sure if it exacerbated her pain but reports being in significant pain in her upper thoracic pain.   She did file for disability but it will be a 6 mo process.  Pt reports left foot pain, concerned about gout.     Review of Systems   Constitutional:  Negative for activity change, appetite change, chills and fatigue.   HENT:  Negative for congestion, postnasal drip, sinus pressure, sinus pain and sore throat.    Respiratory:  Negative for cough, shortness of breath and wheezing.    Cardiovascular:  Negative for chest pain and leg swelling.   Gastrointestinal:  Negative for abdominal distention, diarrhea, nausea and vomiting.   Musculoskeletal:  Positive for back pain.   Neurological:  Negative for weakness and numbness.       Objective   /83 (BP Location: Right arm, Patient Position: Sitting, BP Cuff Size: Adult)   Pulse 101   Ht 1.575 m (5' 2\")   Wt 73.5 kg (162 lb)   BMI 29.63 kg/m²     Physical Exam  Constitutional:       General: She is not in acute distress.     Appearance: Normal appearance. She is not toxic-appearing.   HENT:      Head: Normocephalic and atraumatic.      Nose: Nose normal.      Mouth/Throat:      Mouth: Mucous membranes are moist.      Pharynx: Oropharynx is clear.   Eyes:      Extraocular Movements: Extraocular movements intact.      Conjunctiva/sclera: Conjunctivae normal.      Pupils: Pupils are equal, round, and reactive to light.   Cardiovascular:      Rate and Rhythm: Normal rate and regular rhythm.      Heart sounds: No murmur heard.     No friction rub. No gallop.   Pulmonary:      Effort: Pulmonary effort is normal.      Breath sounds: Normal breath sounds. "   Abdominal:      General: Bowel sounds are normal. There is no distension.      Palpations: Abdomen is soft. There is no mass.      Tenderness: There is no abdominal tenderness. There is no guarding.   Musculoskeletal:         General: No swelling. Normal range of motion.      Cervical back: Normal range of motion.      Comments: +left send toe pain, swelling on distal MCP    Skin:     General: Skin is warm and dry.   Neurological:      General: No focal deficit present.      Mental Status: She is alert and oriented to person, place, and time.   Psychiatric:         Mood and Affect: Mood normal.         Thought Content: Thought content normal.         Judgment: Judgment normal.           Assessment/Plan   Problem List Items Addressed This Visit    None  Visit Diagnoses       Pain of toe, unspecified laterality    -  Primary    Relevant Medications    predniSONE (Deltasone) 20 mg tablet    Other Relevant Orders    Uric acid        Worsening chest pain, tachycardia, has had less frequent episodes   - last stress test was in 7/21 and was normal  - reports that she is under increasing stress at work and doing more physical labor with a lot of heavy lifting   - tachycardia >100   - had echocardiogram, EF 65-70%, impaired relaxation patter of left vetricular diastolic filling, bubble study positive for  right to left shunt     2. Cervical spinal stenosis, chronic radiculopathy   - following with neurosurgery and pain management   - on savella   - off tramadol, gabapentin      3. Extending leave of absence due to above medical issues until 8/27/2023.      Pt filling for disability which I agree with,  I do think her physical job is exacerbating her chronic back issues. She is unable to lift, bend, twist, etc with her cervical spinal stenosos and other chronic spinal issues that are progressively worsening.      4. Possible gout though improved, will order uric acid if it recurs    Final diagnoses:   [M79.676] Pain of toe,  unspecified laterality

## 2023-08-22 ENCOUNTER — APPOINTMENT (OUTPATIENT)
Dept: PRIMARY CARE | Facility: CLINIC | Age: 63
End: 2023-08-22
Payer: COMMERCIAL

## 2023-08-22 PROBLEM — S80.00XA CONTUSION OF KNEE: Status: ACTIVE | Noted: 2023-08-22

## 2023-08-22 PROBLEM — R06.83 SNORING: Status: ACTIVE | Noted: 2023-08-22

## 2023-08-22 PROBLEM — N39.3 URINARY, INCONTINENCE, STRESS FEMALE: Status: ACTIVE | Noted: 2023-08-22

## 2023-08-22 PROBLEM — M76.52 PATELLAR TENDINITIS OF LEFT KNEE: Status: ACTIVE | Noted: 2023-08-22

## 2023-08-22 PROBLEM — M96.1 FAILED NECK SYNDROME: Status: ACTIVE | Noted: 2023-08-22

## 2023-08-22 PROBLEM — G89.4 CHRONIC PAIN DISORDER: Status: ACTIVE | Noted: 2023-08-22

## 2023-08-24 PROBLEM — D22.5 MELANOCYTIC NEVI OF TRUNK: Status: ACTIVE | Noted: 2023-05-10

## 2023-08-24 PROBLEM — R10.9 FLANK PAIN: Status: RESOLVED | Noted: 2023-02-04 | Resolved: 2023-08-24

## 2023-08-24 PROBLEM — R42 DIZZINESS: Status: RESOLVED | Noted: 2023-02-04 | Resolved: 2023-08-24

## 2023-08-24 PROBLEM — R06.83 SNORING: Status: RESOLVED | Noted: 2023-08-22 | Resolved: 2023-08-24

## 2023-08-24 PROBLEM — J32.9 SINUSITIS: Status: RESOLVED | Noted: 2023-02-04 | Resolved: 2023-08-24

## 2023-08-24 PROBLEM — E66.9 CLASS 1 OBESITY WITH BODY MASS INDEX (BMI) OF 30.0 TO 30.9 IN ADULT: Status: RESOLVED | Noted: 2023-02-04 | Resolved: 2023-08-24

## 2023-08-24 PROBLEM — L57.0 ACTINIC KERATOSIS: Status: ACTIVE | Noted: 2023-05-10

## 2023-08-24 PROBLEM — G89.18 POST-OP PAIN: Status: RESOLVED | Noted: 2023-02-04 | Resolved: 2023-08-24

## 2023-08-24 PROBLEM — M54.6 THORACIC BACK PAIN: Status: RESOLVED | Noted: 2023-02-04 | Resolved: 2023-08-24

## 2023-08-24 PROBLEM — Z98.1 STATUS POST CERVICAL SPINAL FUSION: Status: RESOLVED | Noted: 2023-02-04 | Resolved: 2023-08-24

## 2023-08-24 PROBLEM — L85.3 XEROSIS CUTIS: Status: ACTIVE | Noted: 2023-05-10

## 2023-08-24 PROBLEM — M79.606 LEG PAIN: Status: RESOLVED | Noted: 2023-02-04 | Resolved: 2023-08-24

## 2023-08-24 PROBLEM — L91.8 OTHER HYPERTROPHIC DISORDERS OF THE SKIN: Status: ACTIVE | Noted: 2023-05-10

## 2023-08-24 PROBLEM — Z91.89 10 YEAR RISK OF MI OR STROKE < 7.5%: Status: ACTIVE | Noted: 2023-08-24

## 2023-08-24 PROBLEM — M54.2 NECK PAIN: Status: RESOLVED | Noted: 2023-02-04 | Resolved: 2023-08-24

## 2023-08-24 PROBLEM — L81.4 OTHER MELANIN HYPERPIGMENTATION: Status: ACTIVE | Noted: 2023-05-10

## 2023-08-24 PROBLEM — R19.7 DIARRHEA: Status: RESOLVED | Noted: 2023-02-04 | Resolved: 2023-08-24

## 2023-08-24 PROBLEM — R51.9 HEADACHE: Status: RESOLVED | Noted: 2023-02-04 | Resolved: 2023-08-24

## 2023-08-24 PROBLEM — D18.01 HEMANGIOMA OF SKIN AND SUBCUTANEOUS TISSUE: Status: ACTIVE | Noted: 2023-05-10

## 2023-08-24 PROBLEM — M25.50 JOINT PAIN: Status: RESOLVED | Noted: 2023-02-04 | Resolved: 2023-08-24

## 2023-08-24 PROBLEM — S80.02XA CONTUSION OF LEFT KNEE: Status: RESOLVED | Noted: 2023-02-04 | Resolved: 2023-08-24

## 2023-08-24 PROBLEM — M54.9 BACK PAIN: Status: RESOLVED | Noted: 2023-02-04 | Resolved: 2023-08-24

## 2023-08-24 PROBLEM — R29.898 LEG WEAKNESS: Status: RESOLVED | Noted: 2023-02-04 | Resolved: 2023-08-24

## 2023-08-24 PROBLEM — N39.0 UTI (URINARY TRACT INFECTION): Status: RESOLVED | Noted: 2023-02-04 | Resolved: 2023-08-24

## 2023-08-24 PROBLEM — M43.12 SPONDYLOLISTHESIS OF CERVICAL REGION: Status: RESOLVED | Noted: 2023-02-04 | Resolved: 2023-08-24

## 2023-08-24 PROBLEM — E66.811 CLASS 1 OBESITY WITH BODY MASS INDEX (BMI) OF 30.0 TO 30.9 IN ADULT: Status: RESOLVED | Noted: 2023-02-04 | Resolved: 2023-08-24

## 2023-08-24 PROBLEM — S80.00XA CONTUSION OF KNEE: Status: RESOLVED | Noted: 2023-08-22 | Resolved: 2023-08-24

## 2023-08-24 RX ORDER — UMECLIDINIUM BROMIDE AND VILANTEROL TRIFENATATE 62.5; 25 UG/1; UG/1
1 POWDER RESPIRATORY (INHALATION) DAILY
COMMUNITY

## 2023-09-07 VITALS — WEIGHT: 162.92 LBS | HEIGHT: 62 IN | BODY MASS INDEX: 29.98 KG/M2

## 2023-09-26 ENCOUNTER — OFFICE VISIT (OUTPATIENT)
Dept: PRIMARY CARE | Facility: CLINIC | Age: 63
End: 2023-09-26
Payer: COMMERCIAL

## 2023-09-26 VITALS
WEIGHT: 161 LBS | HEIGHT: 62 IN | SYSTOLIC BLOOD PRESSURE: 137 MMHG | DIASTOLIC BLOOD PRESSURE: 89 MMHG | BODY MASS INDEX: 29.63 KG/M2 | HEART RATE: 91 BPM

## 2023-09-26 DIAGNOSIS — K21.9 GASTROESOPHAGEAL REFLUX DISEASE WITHOUT ESOPHAGITIS: ICD-10-CM

## 2023-09-26 DIAGNOSIS — M48.02 CERVICAL SPINAL STENOSIS: Primary | ICD-10-CM

## 2023-09-26 DIAGNOSIS — E78.00 HYPERCHOLESTEROLEMIA: ICD-10-CM

## 2023-09-26 DIAGNOSIS — I10 BENIGN ESSENTIAL HYPERTENSION: ICD-10-CM

## 2023-09-26 DIAGNOSIS — E03.9 ACQUIRED HYPOTHYROIDISM: ICD-10-CM

## 2023-09-26 DIAGNOSIS — F41.9 ANXIETY: ICD-10-CM

## 2023-09-26 PROCEDURE — 1036F TOBACCO NON-USER: CPT | Performed by: INTERNAL MEDICINE

## 2023-09-26 PROCEDURE — 3079F DIAST BP 80-89 MM HG: CPT | Performed by: INTERNAL MEDICINE

## 2023-09-26 PROCEDURE — 3075F SYST BP GE 130 - 139MM HG: CPT | Performed by: INTERNAL MEDICINE

## 2023-09-26 PROCEDURE — 99214 OFFICE O/P EST MOD 30 MIN: CPT | Performed by: INTERNAL MEDICINE

## 2023-09-26 RX ORDER — HYDROXYZINE PAMOATE 25 MG/1
25 CAPSULE ORAL 3 TIMES DAILY PRN
Qty: 90 CAPSULE | Refills: 0 | Status: SHIPPED | OUTPATIENT
Start: 2023-09-26 | End: 2023-09-26

## 2023-09-26 RX ORDER — PREDNISONE 20 MG/1
TABLET ORAL
Qty: 18 TABLET | Refills: 0 | Status: SHIPPED | OUTPATIENT
Start: 2023-09-26 | End: 2023-09-26

## 2023-09-26 ASSESSMENT — ENCOUNTER SYMPTOMS: ARTHRALGIAS: 1

## 2023-09-26 NOTE — PROGRESS NOTES
"Subjective   Patient ID: Julia Mcguire is a 63 y.o. female who presents for Follow-up (1 month/Pt unsure if her heart doctor told her to double up on the Atorvastatin and Losartan /Pt unsure if she needs to be on her hydrochlorothiazide; needs a RF if so).  HPI  Patient is here today for 1 mo follow up   Pt had meds adjusted by cardiology after cardiac cath, can increase losartan to 100mg if bp > 140/90, statin 80mg po daily.    Patient is going through disability process, saw psychologist and state spine Dr.     She complains of matilde shoulder pain, no injury that she knows of ,feels like it coming from her neck,     Savella is very expensive without coupon, does no think it has helped much.  Discussed weaning off it.     Son overdose in her home 6 weeks ago and she had to do CPR on him.     Review of Systems   Musculoskeletal:  Positive for arthralgias.       Objective   /89 (BP Location: Right arm, Patient Position: Sitting, BP Cuff Size: Adult)   Pulse 91   Ht 1.575 m (5' 2\")   Wt 73 kg (161 lb)   BMI 29.45 kg/m²     Physical Exam  Constitutional:       General: She is not in acute distress.     Appearance: Normal appearance. She is not toxic-appearing.   HENT:      Head: Normocephalic and atraumatic.   Cardiovascular:      Rate and Rhythm: Normal rate and regular rhythm.      Heart sounds: No murmur heard.     No friction rub. No gallop.   Pulmonary:      Effort: Pulmonary effort is normal.      Breath sounds: Normal breath sounds.   Abdominal:      General: Bowel sounds are normal. There is no distension.      Palpations: Abdomen is soft. There is no mass.      Tenderness: There is no abdominal tenderness. There is no guarding.   Musculoskeletal:         General: No swelling. Normal range of motion.      Cervical back: Normal range of motion.   Skin:     General: Skin is warm and dry.   Neurological:      General: No focal deficit present.      Mental Status: She is alert and oriented to person, " place, and time.   Psychiatric:         Mood and Affect: Mood normal.         Thought Content: Thought content normal.         Judgment: Judgment normal.           Assessment/Plan   Problem List Items Addressed This Visit       Anxiety    Relevant Medications    hydrOXYzine pamoate (Vistaril) 25 mg capsule    Benign essential hypertension    Cervical spinal stenosis - Primary    Relevant Medications    predniSONE (Deltasone) 20 mg tablet    GERD (gastroesophageal reflux disease)    Hypercholesterolemia    Hypothyroidism   Chest pain, tachycardia   - last stress test was in 7/21 and was normal  - had cardiac cath   - had echocardiogram, EF 65-70%, impaired relaxation patter of left vetricular diastolic filling, bubble study positive for  right to left shunt  - following with cardiology   - on losartan 50-100mg po daily with hydrochlorothiazide   - on lipitor 80mg po daily      2. Cervical spinal stenosis, chronic radiculopathy   - following with neurosurgery and pain management   - will give prednisone taper, suspect radiating from neck   - on savella will wean off   - off tramadol, gabapentin      3. Anxiety   - will wean off savella   - try vistaril   - consider trying cymbalta or celexa   - tried zoloft, wellbutrin, buspar     4. Disability pending     Final diagnoses:   [M48.02] Cervical spinal stenosis   [F41.9] Anxiety   [E78.00] Hypercholesterolemia   [I10] Benign essential hypertension   [E03.9] Acquired hypothyroidism   [K21.9] Gastroesophageal reflux disease without esophagitis

## 2023-09-30 NOTE — H&P
History & Physical Reviewed:   I have reviewed the History and Physical dated:  24-May-2023   History and Physical reviewed and relevant findings noted. Patient examined to review pertinent physical  findings.: No significant changes   Home Medications Reviewed: no changes noted   Allergies Reviewed: no changes noted       Airway/Sedation Assessment:  ·  Emotional Status calm   ·  Neurologic alert & oriented x 3   ·  Respiratory clear to auscultation   ·  Cardiovascular rhythm & rate regular   ·  GI/ soft, nontender     · Pulses present: Pedal Left, Pedal Right, Radial Left, Radial Right     ·  Mouth Opening OK yes   ·  Neck Flexibility OK yes   ·  Loose Teeth no     Oropharyngeal Classification:          ·  Oropharyngeal Classification Class III   ·  ASA PS Classification ASA II   ·  Sedation Plan moderate sedation       ERAS (Enhanced Recovery After Surgery):  ·  ERAS Patient: no     Consent:   COVID-19 Consent:  ·  COVID-19 Risk Consent Surgeon has reviewed key risks related to the risk of fermin COVID-19 and if they contract COVID-19 what the risks are.       Electronic Signatures:  Augustina Soto (APRN-CNP)  (Signed 08-Jun-2023 07:45)   Authored: History & Physical Reviewed, Airway/Sedation,  ERAS, Consent, Note Completion  Ramiro Aguilera)  (Signed 08-Jun-2023 08:48)   Authored: Airway/Sedation   Co-Signer: History & Physical Reviewed, Airway/Sedation, ERAS, Consent, Note Completion      Last Updated: 08-Jun-2023 08:48 by Ramiro Aguilera)

## 2023-10-03 ENCOUNTER — OFFICE VISIT (OUTPATIENT)
Dept: PAIN MEDICINE | Facility: CLINIC | Age: 63
End: 2023-10-03
Payer: COMMERCIAL

## 2023-10-03 ENCOUNTER — PHARMACY VISIT (OUTPATIENT)
Dept: PHARMACY | Facility: CLINIC | Age: 63
End: 2023-10-03
Payer: COMMERCIAL

## 2023-10-03 VITALS
RESPIRATION RATE: 16 BRPM | HEIGHT: 62 IN | SYSTOLIC BLOOD PRESSURE: 145 MMHG | DIASTOLIC BLOOD PRESSURE: 91 MMHG | HEART RATE: 72 BPM | BODY MASS INDEX: 30 KG/M2 | WEIGHT: 163 LBS

## 2023-10-03 DIAGNOSIS — M96.1 POSTLAMINECTOMY SYNDROME, CERVICAL REGION: Primary | ICD-10-CM

## 2023-10-03 LAB
AMPHETAMINES UR QL SCN: NORMAL
BARBITURATES UR QL SCN: NORMAL
BENZODIAZ UR QL SCN: NORMAL
BZE UR QL SCN: NORMAL
CANNABINOIDS UR QL SCN: NORMAL
FENTANYL+NORFENTANYL UR QL SCN: NORMAL
OPIATES UR QL SCN: NORMAL
OXYCODONE+OXYMORPHONE UR QL SCN: NORMAL
PCP UR QL SCN: NORMAL

## 2023-10-03 PROCEDURE — 99213 OFFICE O/P EST LOW 20 MIN: CPT | Performed by: PHYSICIAN ASSISTANT

## 2023-10-03 PROCEDURE — 80307 DRUG TEST PRSMV CHEM ANLYZR: CPT | Performed by: PHYSICIAN ASSISTANT

## 2023-10-03 PROCEDURE — RXMED WILLOW AMBULATORY MEDICATION CHARGE

## 2023-10-03 RX ORDER — TRAMADOL HYDROCHLORIDE 50 MG/1
50 TABLET ORAL DAILY PRN
Qty: 20 TABLET | Refills: 0 | Status: SHIPPED | OUTPATIENT
Start: 2023-10-03 | End: 2023-11-07 | Stop reason: SDUPTHER

## 2023-10-03 ASSESSMENT — ENCOUNTER SYMPTOMS
ALLERGIC/IMMUNOLOGIC NEGATIVE: 1
MYALGIAS: 1
CARDIOVASCULAR NEGATIVE: 1
CONSTITUTIONAL NEGATIVE: 1
GASTROINTESTINAL NEGATIVE: 1
NECK STIFFNESS: 1
HEMATOLOGIC/LYMPHATIC NEGATIVE: 1
JOINT SWELLING: 1
ENDOCRINE NEGATIVE: 1
WEAKNESS: 1
NECK PAIN: 1
PSYCHIATRIC NEGATIVE: 1
RESPIRATORY NEGATIVE: 1
EYES NEGATIVE: 1
TREMORS: 1

## 2023-10-03 ASSESSMENT — COLUMBIA-SUICIDE SEVERITY RATING SCALE - C-SSRS
1. IN THE PAST MONTH, HAVE YOU WISHED YOU WERE DEAD OR WISHED YOU COULD GO TO SLEEP AND NOT WAKE UP?: NO
2. HAVE YOU ACTUALLY HAD ANY THOUGHTS OF KILLING YOURSELF?: NO
6. HAVE YOU EVER DONE ANYTHING, STARTED TO DO ANYTHING, OR PREPARED TO DO ANYTHING TO END YOUR LIFE?: NO

## 2023-10-03 ASSESSMENT — PAIN SCALES - GENERAL
PAINLEVEL: 6
PAINLEVEL_OUTOF10: 6

## 2023-10-03 ASSESSMENT — PATIENT HEALTH QUESTIONNAIRE - PHQ9
10. IF YOU CHECKED OFF ANY PROBLEMS, HOW DIFFICULT HAVE THESE PROBLEMS MADE IT FOR YOU TO DO YOUR WORK, TAKE CARE OF THINGS AT HOME, OR GET ALONG WITH OTHER PEOPLE: NOT DIFFICULT AT ALL
2. FEELING DOWN, DEPRESSED OR HOPELESS: SEVERAL DAYS
SUM OF ALL RESPONSES TO PHQ9 QUESTIONS 1 AND 2: 1
1. LITTLE INTEREST OR PLEASURE IN DOING THINGS: NOT AT ALL

## 2023-10-03 ASSESSMENT — PAIN - FUNCTIONAL ASSESSMENT: PAIN_FUNCTIONAL_ASSESSMENT: 0-10

## 2023-10-03 ASSESSMENT — PAIN DESCRIPTION - DESCRIPTORS: DESCRIPTORS: ACHING;SHARP;HEADACHE

## 2023-10-03 NOTE — PROGRESS NOTES
Subjective   Patient ID: Julia Mcguire is a 63 y.o. female who presents for Neck Pain (Patient complains of pain in her neck.  She states it radiates into her shoulders and down her arms to her hands.  She states her shoulder pain has been much worse lately.  Patient states the left shoulder is worse than the right.  Patient states she gets tremors intermittently in her hands and intermittent headaches.  Patient also complains of numbness and tingling in her bilateral arms.  Rates this pain a 6/10.) and Back Pain (Patient complains of lower back pain.  She states it radiates across her lower back.  She states she has weakness in her legs and that sometimes causes leg pain.  Patient states she has a small amount of numbness and tingling in her legs.  Rates this pain a 4/10.).  JANINE score 56.  Smoking screen completed, negative.  Depression screen completed, negative.  BMI 29.8, education handout provided.    HPI  Patient is a 63-year-old female.  She presents today for 2-month medication management follow-up.  She is off of the Savella given to her by her PCP and is going to try Vistaril.  She is no longer using the gabapentin.  She is using tramadol.  50 mg twice daily as needed pain.  She states that she tries to take them as sparingly as possible.  She has noticed some increased neck pain with bilateral shoulder pain.  Left side greater than right side.  Patient states that she has noticed more tremors in her hands and her legs feel weak.  She states that she is attempting to get Social Security disability.  She states that right now, her tramadol keeps things at least able.  She was considering other options but at this time, she just does not think she can do this.  She states that it is too costly for her.  She would like to just get a medication follow-up.  She does not want to do anything differently at this time.    Review of Systems   Constitutional: Negative.    HENT: Negative.     Eyes: Negative.     Respiratory: Negative.     Cardiovascular: Negative.    Gastrointestinal: Negative.    Endocrine: Negative.    Genitourinary: Negative.    Musculoskeletal:  Positive for joint swelling, myalgias, neck pain and neck stiffness.   Skin: Negative.    Allergic/Immunologic: Negative.    Neurological:  Positive for tremors and weakness.   Hematological: Negative.    Psychiatric/Behavioral: Negative.         Objective   Physical Exam  Constitutional:       Appearance: Normal appearance.   HENT:      Head: Normocephalic.      Right Ear: External ear normal.      Left Ear: External ear normal.      Nose: Nose normal.      Mouth/Throat:      Mouth: Mucous membranes are moist.   Eyes:      Pupils: Pupils are equal, round, and reactive to light.   Cardiovascular:      Rate and Rhythm: Normal rate and regular rhythm.      Pulses: Normal pulses.      Heart sounds: Normal heart sounds.   Pulmonary:      Effort: Pulmonary effort is normal.      Breath sounds: Normal breath sounds.   Musculoskeletal:         General: Normal range of motion.      Cervical back: Normal range of motion.      Comments: Diminished range of motion of her bilateral deltoid 5 -/5  Otherwise 5/5 upper extremity strength  Demonstrates her motion of her shoulders as well.  Difficulty lifting them above her head.   Skin:     General: Skin is warm and dry.   Neurological:      General: No focal deficit present.      Mental Status: She is alert and oriented to person, place, and time.   Psychiatric:         Mood and Affect: Mood normal.         Behavior: Behavior normal.         Assessment/Plan   Diagnoses and all orders for this visit:  Postlaminectomy syndrome, cervical region  -     traMADol (Ultram) 50 mg tablet; TAKE 1 TABLET BY MOUTH EVERY DAY AS NEEDED  -     Drug Screen, Urine With Reflex to Confirmation; Future       At this time, patient states that things are fairly well and stable with her tramadol.  50 mg twice daily.  OARRS was reviewed and is  appropriate.  Narcan was offered and declined.  Most recent UDS was reviewed.  We will obtain an updated UDS today for compliance purposes.  Narcan was offered and declined.  At this time, she is going to continue on the medication.  We discussed obtaining updated advanced imaging and pursuing possible injection options but at this time, she declines.  She states that she cannot afford either of these things.  She will let us know if she changes her mind.  Otherwise she will follow-up in 2 months for medication management.  Call clinic sooner if necessary.

## 2023-10-08 ENCOUNTER — PHARMACY VISIT (OUTPATIENT)
Dept: PHARMACY | Facility: CLINIC | Age: 63
End: 2023-10-08

## 2023-10-08 PROCEDURE — RXOTC WILLOW AMBULATORY OTC CHARGE

## 2023-10-16 ENCOUNTER — PHARMACY VISIT (OUTPATIENT)
Dept: PHARMACY | Facility: CLINIC | Age: 63
End: 2023-10-16
Payer: COMMERCIAL

## 2023-10-16 ENCOUNTER — TELEPHONE (OUTPATIENT)
Dept: PRIMARY CARE | Facility: CLINIC | Age: 63
End: 2023-10-16
Payer: COMMERCIAL

## 2023-10-16 DIAGNOSIS — J06.9 UPPER RESPIRATORY TRACT INFECTION, UNSPECIFIED TYPE: Primary | ICD-10-CM

## 2023-10-16 PROCEDURE — RXMED WILLOW AMBULATORY MEDICATION CHARGE

## 2023-10-16 RX ORDER — AMOXICILLIN AND CLAVULANATE POTASSIUM 875; 125 MG/1; MG/1
875 TABLET, FILM COATED ORAL 2 TIMES DAILY
Qty: 20 TABLET | Refills: 0 | Status: SHIPPED | OUTPATIENT
Start: 2023-10-16 | End: 2023-10-28

## 2023-10-16 NOTE — TELEPHONE ENCOUNTER
Pt has sore throat, swollen lymph nodes on left side of neck and left ear pain,  little congestion, home covid test was neg.  Can you send her in meds?  [facility] pharmacy ,  please advise

## 2023-10-18 ENCOUNTER — PHARMACY VISIT (OUTPATIENT)
Dept: PHARMACY | Facility: CLINIC | Age: 63
End: 2023-10-18

## 2023-10-18 PROCEDURE — RXOTC WILLOW AMBULATORY OTC CHARGE

## 2023-10-25 DIAGNOSIS — F41.9 ANXIETY: ICD-10-CM

## 2023-10-25 DIAGNOSIS — I10 BENIGN ESSENTIAL HYPERTENSION: Primary | ICD-10-CM

## 2023-10-25 DIAGNOSIS — E03.9 ACQUIRED HYPOTHYROIDISM: ICD-10-CM

## 2023-10-27 ENCOUNTER — PHARMACY VISIT (OUTPATIENT)
Dept: PHARMACY | Facility: CLINIC | Age: 63
End: 2023-10-27
Payer: COMMERCIAL

## 2023-10-27 PROCEDURE — RXMED WILLOW AMBULATORY MEDICATION CHARGE

## 2023-10-27 RX ORDER — HYDROXYZINE PAMOATE 25 MG/1
CAPSULE ORAL
Qty: 90 CAPSULE | Refills: 0 | Status: SHIPPED | OUTPATIENT
Start: 2023-10-27 | End: 2024-10-26

## 2023-10-27 RX ORDER — LEVOTHYROXINE SODIUM 88 UG/1
88 TABLET ORAL DAILY
Qty: 90 TABLET | Refills: 3 | Status: SHIPPED | OUTPATIENT
Start: 2023-10-27

## 2023-10-27 RX ORDER — HYDROCHLOROTHIAZIDE 12.5 MG/1
12.5 CAPSULE ORAL DAILY
Qty: 90 CAPSULE | Refills: 3 | Status: SHIPPED | OUTPATIENT
Start: 2023-10-27 | End: 2024-05-02 | Stop reason: ALTCHOICE

## 2023-10-27 RX ORDER — LOSARTAN POTASSIUM 50 MG/1
50 TABLET ORAL DAILY
Qty: 90 TABLET | Refills: 3 | Status: SHIPPED | OUTPATIENT
Start: 2023-10-27

## 2023-11-01 ENCOUNTER — PHARMACY VISIT (OUTPATIENT)
Dept: PHARMACY | Facility: CLINIC | Age: 63
End: 2023-11-01
Payer: COMMERCIAL

## 2023-11-01 DIAGNOSIS — B37.9 YEAST INFECTION: ICD-10-CM

## 2023-11-01 PROCEDURE — RXMED WILLOW AMBULATORY MEDICATION CHARGE

## 2023-11-01 RX ORDER — FLUCONAZOLE 150 MG/1
150 TABLET ORAL ONCE
Qty: 1 TABLET | Refills: 0 | Status: SHIPPED | OUTPATIENT
Start: 2023-11-01 | End: 2023-11-02

## 2023-11-06 ENCOUNTER — TELEPHONE (OUTPATIENT)
Dept: PAIN MEDICINE | Facility: CLINIC | Age: 63
End: 2023-11-06
Payer: COMMERCIAL

## 2023-11-06 DIAGNOSIS — M96.1 POSTLAMINECTOMY SYNDROME, CERVICAL REGION: ICD-10-CM

## 2023-11-07 ENCOUNTER — PHARMACY VISIT (OUTPATIENT)
Dept: PHARMACY | Facility: CLINIC | Age: 63
End: 2023-11-07
Payer: COMMERCIAL

## 2023-11-07 PROCEDURE — RXMED WILLOW AMBULATORY MEDICATION CHARGE

## 2023-11-07 RX ORDER — TRAMADOL HYDROCHLORIDE 50 MG/1
50 TABLET ORAL DAILY PRN
Qty: 20 TABLET | Refills: 0 | Status: SHIPPED | OUTPATIENT
Start: 2023-11-07 | End: 2023-12-05 | Stop reason: SDUPTHER

## 2023-11-21 ENCOUNTER — OFFICE VISIT (OUTPATIENT)
Dept: PRIMARY CARE | Facility: CLINIC | Age: 63
End: 2023-11-21
Payer: COMMERCIAL

## 2023-11-21 ENCOUNTER — PHARMACY VISIT (OUTPATIENT)
Dept: PHARMACY | Facility: CLINIC | Age: 63
End: 2023-11-21
Payer: COMMERCIAL

## 2023-11-21 VITALS
BODY MASS INDEX: 30 KG/M2 | WEIGHT: 163 LBS | HEIGHT: 62 IN | DIASTOLIC BLOOD PRESSURE: 83 MMHG | SYSTOLIC BLOOD PRESSURE: 118 MMHG | HEART RATE: 81 BPM

## 2023-11-21 DIAGNOSIS — M43.12 SPONDYLOLISTHESIS OF CERVICAL REGION: ICD-10-CM

## 2023-11-21 DIAGNOSIS — R19.7 DIARRHEA, UNSPECIFIED TYPE: ICD-10-CM

## 2023-11-21 DIAGNOSIS — M48.02 SPINAL STENOSIS OF CERVICAL REGION: ICD-10-CM

## 2023-11-21 DIAGNOSIS — E78.00 HYPERCHOLESTEROLEMIA: ICD-10-CM

## 2023-11-21 DIAGNOSIS — K21.9 GASTROESOPHAGEAL REFLUX DISEASE WITHOUT ESOPHAGITIS: ICD-10-CM

## 2023-11-21 DIAGNOSIS — I10 BENIGN ESSENTIAL HYPERTENSION: ICD-10-CM

## 2023-11-21 DIAGNOSIS — F41.9 ANXIETY: Primary | ICD-10-CM

## 2023-11-21 DIAGNOSIS — F41.8 DEPRESSION WITH ANXIETY: ICD-10-CM

## 2023-11-21 PROCEDURE — 3079F DIAST BP 80-89 MM HG: CPT | Performed by: INTERNAL MEDICINE

## 2023-11-21 PROCEDURE — 1036F TOBACCO NON-USER: CPT | Performed by: INTERNAL MEDICINE

## 2023-11-21 PROCEDURE — 3074F SYST BP LT 130 MM HG: CPT | Performed by: INTERNAL MEDICINE

## 2023-11-21 PROCEDURE — RXMED WILLOW AMBULATORY MEDICATION CHARGE

## 2023-11-21 PROCEDURE — 99214 OFFICE O/P EST MOD 30 MIN: CPT | Performed by: INTERNAL MEDICINE

## 2023-11-21 RX ORDER — FLUOXETINE 10 MG/1
10 CAPSULE ORAL DAILY
Qty: 30 CAPSULE | Refills: 1 | Status: SHIPPED | OUTPATIENT
Start: 2023-11-21 | End: 2024-02-21 | Stop reason: ALTCHOICE

## 2023-11-21 RX ORDER — DICYCLOMINE HYDROCHLORIDE 10 MG/1
10 CAPSULE ORAL 4 TIMES DAILY PRN
Qty: 90 CAPSULE | Refills: 0 | Status: SHIPPED | OUTPATIENT
Start: 2023-11-21 | End: 2024-01-20

## 2023-11-21 ASSESSMENT — ENCOUNTER SYMPTOMS
NUMBNESS: 1
WEAKNESS: 1
BACK PAIN: 1
ARTHRALGIAS: 1

## 2023-11-21 NOTE — PROGRESS NOTES
"Subjective   Patient ID: Julia Mcguire is a 63 y.o. female who presents for Follow-up (2 month).  HPI  PATient is here today for 2 mo follow up.  Pt reports that her tremors in her hands are getting significant, worse in the right hand. She states that it has been going on but it is getting more frequent and more long lasting, never had it at all prior to her last neck surgery.     Review of Systems   Musculoskeletal:  Positive for arthralgias and back pain.   Neurological:  Positive for weakness and numbness.       Objective   /83   Pulse 81   Ht 1.575 m (5' 2\")   Wt 73.9 kg (163 lb)   BMI 29.81 kg/m²     Physical Exam  Constitutional:       General: She is not in acute distress.     Appearance: Normal appearance. She is normal weight.   Abdominal:      General: Bowel sounds are normal.      Palpations: Abdomen is soft.      Tenderness: There is abdominal tenderness (in center of lower abdomen).   Neurological:      Mental Status: She is alert.   Psychiatric:         Mood and Affect: Mood normal.         Behavior: Behavior normal.         Thought Content: Thought content normal.           Assessment/Plan   Problem List Items Addressed This Visit       Anxiety - Primary    Benign essential hypertension    Spinal stenosis    Depression with anxiety    GERD (gastroesophageal reflux disease)    Hypercholesterolemia    RESOLVED: Spondylolisthesis of cervical region     HTN, HLD  - had cardiac cath   - had echocardiogram, EF 65-70%, impaired relaxation patter of left vetricular diastolic filling, bubble study positive for  right to left shunt  - following with cardiology   - on losartan 50-100mg po daily with hydrochlorothiazide   - on lipitor 80mg po daily      2. Cervical spinal stenosis, chronic radiculopathy   - following with neurosurgery and pain management    - off tramadol, gabapentin      3. Anxiety   - she would like to try prozac, will start 10mg po daily   - tried zoloft, wellbutrin, buspar, " miracle arellano      4. Disability was approved , has applied for long term disability.     5. Hx of diverticulitis, having diarrhea, pain in lower quadrants.   - will try bentyl  - was recently on Augmentin so that should have resolving active diverticulitis as well   Final diagnoses:   [F41.9] Anxiety   [E78.00] Hypercholesterolemia   [I10] Benign essential hypertension   [K21.9] Gastroesophageal reflux disease without esophagitis   [F41.8] Depression with anxiety   [M43.12] Spondylolisthesis of cervical region   [M48.02] Spinal stenosis of cervical region

## 2023-12-05 ENCOUNTER — OFFICE VISIT (OUTPATIENT)
Dept: PAIN MEDICINE | Facility: CLINIC | Age: 63
End: 2023-12-05
Payer: COMMERCIAL

## 2023-12-05 VITALS
HEART RATE: 91 BPM | SYSTOLIC BLOOD PRESSURE: 164 MMHG | WEIGHT: 168 LBS | BODY MASS INDEX: 30.73 KG/M2 | DIASTOLIC BLOOD PRESSURE: 99 MMHG

## 2023-12-05 DIAGNOSIS — M54.12 CERVICAL RADICULOPATHY, CHRONIC: ICD-10-CM

## 2023-12-05 DIAGNOSIS — G95.9 CERVICAL MYELOPATHY (MULTI): ICD-10-CM

## 2023-12-05 DIAGNOSIS — M79.7 FIBROMYALGIA: Primary | ICD-10-CM

## 2023-12-05 DIAGNOSIS — M50.30 BULGING OF CERVICAL INTERVERTEBRAL DISC: ICD-10-CM

## 2023-12-05 DIAGNOSIS — M96.1 POSTLAMINECTOMY SYNDROME, CERVICAL REGION: ICD-10-CM

## 2023-12-05 DIAGNOSIS — M96.1 FAILED NECK SYNDROME: ICD-10-CM

## 2023-12-05 DIAGNOSIS — M51.34 BULGING OF THORACIC INTERVERTEBRAL DISC: ICD-10-CM

## 2023-12-05 DIAGNOSIS — M50.30 DEGENERATION OF CERVICAL INTERVERTEBRAL DISC: ICD-10-CM

## 2023-12-05 PROCEDURE — RXMED WILLOW AMBULATORY MEDICATION CHARGE

## 2023-12-05 PROCEDURE — 99213 OFFICE O/P EST LOW 20 MIN: CPT | Performed by: PHYSICIAN ASSISTANT

## 2023-12-05 RX ORDER — TRAMADOL HYDROCHLORIDE 50 MG/1
50 TABLET ORAL DAILY PRN
Qty: 20 TABLET | Refills: 0 | Status: SHIPPED | OUTPATIENT
Start: 2023-12-05 | End: 2024-01-23 | Stop reason: SDUPTHER

## 2023-12-05 RX ORDER — GABAPENTIN 300 MG/1
300 CAPSULE ORAL 2 TIMES DAILY
Qty: 60 CAPSULE | Refills: 2 | Status: SHIPPED | OUTPATIENT
Start: 2023-12-05

## 2023-12-05 ASSESSMENT — ENCOUNTER SYMPTOMS
ENDOCRINE NEGATIVE: 1
GASTROINTESTINAL NEGATIVE: 1
NECK PAIN: 1
TREMORS: 1
RESPIRATORY NEGATIVE: 1
PSYCHIATRIC NEGATIVE: 1
ARTHRALGIAS: 1
NUMBNESS: 1
WEAKNESS: 1
CONSTITUTIONAL NEGATIVE: 1
HEMATOLOGIC/LYMPHATIC NEGATIVE: 1
CARDIOVASCULAR NEGATIVE: 1
EYES NEGATIVE: 1
ALLERGIC/IMMUNOLOGIC NEGATIVE: 1

## 2023-12-05 ASSESSMENT — PATIENT HEALTH QUESTIONNAIRE - PHQ9
2. FEELING DOWN, DEPRESSED OR HOPELESS: NOT AT ALL
1. LITTLE INTEREST OR PLEASURE IN DOING THINGS: NOT AT ALL
SUM OF ALL RESPONSES TO PHQ9 QUESTIONS 1 AND 2: 0

## 2023-12-05 ASSESSMENT — PAIN SCALES - GENERAL: PAINLEVEL: 6

## 2023-12-05 NOTE — PROGRESS NOTES
Subjective   Patient ID: Tueskelley Mcguire is a 63 y.o. female who presents for Follow-up (FOLLOW UP MEDICATION SHE IS TAKING GABAPENTIN 300MG 1 PO at bedtime SHE DID STOP THE GABAPENTIN FOR A WHILE FELT IT WASN'T HELPING BUT THE TREMOR IN HER RIGHT HAND WAS GETTING WORSE AND HER PCP TOLD HER TO GO BACK ON, SHE IS TAKING TRAMADOL FOR HER NECK AND BACK PAIN AND IS STABLE ON CURRENT DOSE, SHE DOES HER HOME EXERCISES AND STRETCHING, HEATING PAD FOR RELIEF WITH THE COLD WEATHER, ACHING SORE PAIN, ). PAIN SCORE 6/10, SHE CAN WALK UP TO 1/4 MILE, SHE CAN STAND UP TO 10 MINUTES, ETOH -, JANINE=56%  Patient is a 63-year-old female.  She presents today for 2-month medication management follow-up.  She is back on the gabapentin from the advice of her PCP.  She is using it once a day.  She wonders what possibly going back to twice a day.  Also, she is using tramadol.  50 mg twice daily as needed pain.  She states that she tries to take them as sparingly as possible.  S  At this time, she notices neck pain with bilateral shoulder pain.  Left side greater than right side.  Patient states that she has noticed more tremors in her hands and her legs feel weak.  She states that she does not want to have another neck surgery.  She feels that most of her symptoms are coming from this.  She also does not want to have any injections.  She states that her financial situation is tight.  She just does not think she can afford this.  At this time, she states that things are not perfect but she would like to discontinue on her medications.        Review of Systems   Constitutional: Negative.    HENT: Negative.     Eyes: Negative.    Respiratory: Negative.     Cardiovascular: Negative.    Gastrointestinal: Negative.    Endocrine: Negative.    Genitourinary: Negative.    Musculoskeletal:  Positive for arthralgias, gait problem and neck pain.   Skin: Negative.    Allergic/Immunologic: Negative.    Neurological:  Positive for tremors, weakness and  numbness.   Hematological: Negative.    Psychiatric/Behavioral: Negative.         Objective   Physical Exam  Vitals and nursing note reviewed.   Constitutional:       Appearance: Normal appearance. She is obese.   HENT:      Head: Normocephalic and atraumatic.      Right Ear: External ear normal.      Left Ear: External ear normal.      Nose: Nose normal.      Mouth/Throat:      Pharynx: Oropharynx is clear.   Eyes:      Conjunctiva/sclera: Conjunctivae normal.   Cardiovascular:      Rate and Rhythm: Normal rate and regular rhythm.      Pulses: Normal pulses.   Musculoskeletal:         General: Normal range of motion.      Cervical back: Normal range of motion.      Comments: Has a right hand tremor while sitting in her chair  5/5 strength upper and lower extremities other than bilateral deltoid 5 -/5  Negative Ann's  .   Skin:     General: Skin is warm and dry.   Neurological:      Mental Status: She is alert and oriented to person, place, and time. Mental status is at baseline.   Psychiatric:         Mood and Affect: Mood normal.         Behavior: Behavior normal.         Thought Content: Thought content normal.         Judgment: Judgment normal.         MR cervical spine w and wo IV contrast  Status: Final result     PACS Images     Show images for MR cervical spine w and wo IV contrast  Signed by    Signed Time Phone Pager   Dyllan Cash MD 8/31/2022 14:36 [phone]     Exam Information    Status Exam Begun Exam Ended   Final 8/31/2022 08:54 8/31/2022 09:44     Study Result    Narrative & Impression   MRN: [id number]  Patient Name: JOSE RAFAEL TUESDAY     STUDY:  MR CERVICAL WO/W CONTRAST;  8/31/2022 9:44 am     INDICATION:  pain  M48.02: Cervical spinal stenosis.     COMPARISON:  None.  MRI dated 10/04/2021. CT dated 07/28/2022     ACCESSION NUMBER(S):  [id number]     ORDERING CLINICIAN:  ISABELLA BELTRE     TECHNIQUE:  Sagittal STIR, sagittal T2, sagittal T1, axial T2, axial T1, as well  as post  gadolinium sagittal axial T1 weighted MRI images of the  cervical spine were obtained. The patient received 15 mL of Dotarem  gadolinium intravenously.     FINDINGS:  Postoperative changes are identified compatible with previous  discectomies and fusion at the C5/6, C6/7, and C7/T1 levels. There is  metallic artifact from an anterior orthopedic plate and screws  extending from the C5 through T1 levels. The postoperative changes  are new when compared with the prior MRI dated 10/04/2021 but are  noted on the more recent CT of the cervical spine dated 07/28/2022.     There is minimal 1 mm anterolisthesis of C3 on C4.     There are mild degenerative signal changes noted along endplates  within the cervical region.     The visualized spinal cord demonstrates no signal abnormality or  abnormal enhancement within it.     At the C1/2 level, a small amount of soft tissue pannus is again  noted along the posterior margin of the dens contributing to partial  effacement of the ventral subarachnoid space without significant  spinal cord deformity. There is mild overall encroachment upon the  spinal canal.     At the C2/3 level,  there is no significant spinal canal or neural  foraminal narrowing.     At the C3/4 level,  there is again evidence of minimal 1 mm  anterolisthesis of C3 on C4 without significant spinal canal  narrowing. There is mild encroachment upon the neural foramen  bilaterally.     At the C4/5 level,  there is a mild posterior disc osteophyte complex  contributing to partial effacement of the ventral subarachnoid space.  There is mild flattening of the ventral cervical spinal cord which is  draped over the disc/osteophyte complex. There are degenerative  uncovertebral joint changes and degenerative facet changes  contributing to mild-to-moderate encroachment upon the neural foramen  bilaterally.     At the C5/6 level,  there are postoperative changes compatible with a  discectomy and fusion. There is posterior  bony hypertrophy and mild  ligamentum flavum hypertrophy contributing to near-complete  effacement of the ventral and dorsal subarachnoid space. There is  minimal flattening of the ventral cervical spinal cord which is  draped over the posterior bony hypertrophy. There are degenerative  uncovertebral joint changes and degenerative facet changes  contributing to moderate to severe right and moderate left-sided  neural foraminal narrowing.     At the C6/7 level,  there are postoperative changes compatible with a  discectomy and fusion. There is posterior bony hypertrophy more  pronounced to the left of midline contributing to partial effacement  of the ventral and dorsal subarachnoid space. There is flattening of  the left ventral cervical spinal cord which is draped over the bony  hypertrophy. Degenerative uncovertebral joint changes and  degenerative facet changes contribute to mild-to-moderate right-sided  neural foraminal narrowing. There is no significant left-sided neural  foraminal narrowing.     At the C7/T1 level,  there are postoperative changes compatible with  a discectomy and fusion. There is mild posterior bony hypertrophy  more pronounced to the left of midline contributing to flattening of  the ventral subarachnoid space without spinal cord deformity. There  is mild encroachment upon the neural foramen bilaterally.     At the T1/2 level, there is a mild bilobed disc and/or disc  osteophyte complex contributing to mild encroachment upon the spinal  canal without spinal cord deformity. There are degenerative facet  changes. There is moderate to severe encroachment upon the neural  foramen bilaterally.     At the T2/3 level, the sagittal images demonstrate a posterior disc  contributing to effacement of the ventral subarachnoid space. There  is flattening of the ventral thoracic spinal cord which is draped  over the disc. No axial images were obtained through this level  limiting further evaluation.      IMPRESSION:  Postoperative changes are identified compatible with previous  discectomies and fusion at the C5/6, C6/7, and C7/T1 levels. There is  metallic artifact from an anterior orthopedic plate and screws  extending from the C5 through T1 levels. The postoperative changes  are new when compared with the prior MRI dated 10/04/2021 but are  noted on the more recent CT of the cervical spine dated 07/28/2022.     There is minimal 1 mm anterolisthesis of C3 on C4.     There is multilevel spondylosis with varying degrees of spinal canal  and neural foraminal narrowing as described above.     I personally reviewed the images/study and I agree with the findings  as stated. This study was interpreted at [place], [place], [place].     Assessment/Plan   Diagnoses and all orders for this visit:  Fibromyalgia  -     gabapentin (Neurontin) 300 mg capsule; Take 1 capsule (300 mg) by mouth 2 times a day.  Failed neck syndrome  Bulging of cervical intervertebral disc  Bulging of thoracic intervertebral disc  Cervical myelopathy (CMS/HCC)  -     gabapentin (Neurontin) 300 mg capsule; Take 1 capsule (300 mg) by mouth 2 times a day.  Cervical radiculopathy, chronic  Degeneration of cervical intervertebral disc  Postlaminectomy syndrome, cervical region  -     traMADol (Ultram) 50 mg tablet; TAKE 1 TABLET BY MOUTH EVERY DAY AS NEEDED       Patient is a 63-year-old female.  She has a past medical history significant for fibromyalgia, adjacent level cervical stenosis, cervical neuritis, history of myelopathy and cervical degenerative disease.  At this time, she states that surgery is not something that she is able to pursue.  Injections are also not something she wants to pursue.  At this time, she is just here to get medications refilled.  She states that it is not feasible for her to do anything more aggressive as she cannot financially afford it.  At this time we will continue her tramadol  50 mg 1-2 times a day as needed pain and she is going to increase the gabapentin to 300 mg twice daily.  Potential side effects were discussed.  OARRS was reviewed.  Refill sent to the pharmacy.  Most recent UDS was also reviewed and is appropriate.  She will follow-up in 2 months for medication management.  Call clinic sooner if necessary.

## 2023-12-06 DIAGNOSIS — F41.9 ANXIETY: Primary | ICD-10-CM

## 2023-12-06 PROCEDURE — RXMED WILLOW AMBULATORY MEDICATION CHARGE

## 2023-12-06 RX ORDER — FLUOXETINE HYDROCHLORIDE 20 MG/1
20 CAPSULE ORAL DAILY
Qty: 90 CAPSULE | Refills: 1 | Status: SHIPPED | OUTPATIENT
Start: 2023-12-06 | End: 2024-05-02 | Stop reason: ALTCHOICE

## 2023-12-09 ENCOUNTER — PHARMACY VISIT (OUTPATIENT)
Dept: PHARMACY | Facility: CLINIC | Age: 63
End: 2023-12-09
Payer: COMMERCIAL

## 2023-12-21 DIAGNOSIS — M50.30 BULGING OF CERVICAL INTERVERTEBRAL DISC: Primary | ICD-10-CM

## 2023-12-21 PROCEDURE — RXMED WILLOW AMBULATORY MEDICATION CHARGE

## 2023-12-21 RX ORDER — TIZANIDINE 4 MG/1
4 TABLET ORAL EVERY 6 HOURS PRN
Qty: 30 TABLET | Refills: 3 | Status: SHIPPED | OUTPATIENT
Start: 2023-12-21 | End: 2024-01-20

## 2023-12-22 ENCOUNTER — PHARMACY VISIT (OUTPATIENT)
Dept: PHARMACY | Facility: CLINIC | Age: 63
End: 2023-12-22
Payer: COMMERCIAL

## 2024-01-22 ENCOUNTER — TELEPHONE (OUTPATIENT)
Dept: PAIN MEDICINE | Facility: CLINIC | Age: 64
End: 2024-01-22
Payer: COMMERCIAL

## 2024-01-22 DIAGNOSIS — M96.1 POSTLAMINECTOMY SYNDROME, CERVICAL REGION: ICD-10-CM

## 2024-01-23 PROCEDURE — RXMED WILLOW AMBULATORY MEDICATION CHARGE

## 2024-01-23 RX ORDER — TRAMADOL HYDROCHLORIDE 50 MG/1
50 TABLET ORAL DAILY PRN
Qty: 20 TABLET | Refills: 0 | Status: SHIPPED | OUTPATIENT
Start: 2024-01-23 | End: 2024-07-21

## 2024-01-26 PROCEDURE — RXMED WILLOW AMBULATORY MEDICATION CHARGE

## 2024-01-27 ENCOUNTER — PHARMACY VISIT (OUTPATIENT)
Dept: PHARMACY | Facility: CLINIC | Age: 64
End: 2024-01-27
Payer: COMMERCIAL

## 2024-02-05 ENCOUNTER — DOCUMENTATION (OUTPATIENT)
Dept: PAIN MEDICINE | Facility: CLINIC | Age: 64
End: 2024-02-05
Payer: COMMERCIAL

## 2024-02-06 ENCOUNTER — APPOINTMENT (OUTPATIENT)
Dept: PAIN MEDICINE | Facility: CLINIC | Age: 64
End: 2024-02-06
Payer: COMMERCIAL

## 2024-02-21 ENCOUNTER — OFFICE VISIT (OUTPATIENT)
Dept: PRIMARY CARE | Facility: CLINIC | Age: 64
End: 2024-02-21
Payer: COMMERCIAL

## 2024-02-21 VITALS
HEART RATE: 76 BPM | SYSTOLIC BLOOD PRESSURE: 119 MMHG | HEIGHT: 62 IN | BODY MASS INDEX: 29.63 KG/M2 | DIASTOLIC BLOOD PRESSURE: 80 MMHG | WEIGHT: 161 LBS

## 2024-02-21 DIAGNOSIS — G47.33 OSA (OBSTRUCTIVE SLEEP APNEA): ICD-10-CM

## 2024-02-21 DIAGNOSIS — E03.9 ACQUIRED HYPOTHYROIDISM: ICD-10-CM

## 2024-02-21 DIAGNOSIS — K21.9 GASTROESOPHAGEAL REFLUX DISEASE WITHOUT ESOPHAGITIS: ICD-10-CM

## 2024-02-21 DIAGNOSIS — G95.9 CERVICAL MYELOPATHY (MULTI): ICD-10-CM

## 2024-02-21 DIAGNOSIS — F41.8 DEPRESSION WITH ANXIETY: ICD-10-CM

## 2024-02-21 DIAGNOSIS — E78.00 HYPERCHOLESTEROLEMIA: ICD-10-CM

## 2024-02-21 DIAGNOSIS — I10 BENIGN ESSENTIAL HYPERTENSION: Primary | ICD-10-CM

## 2024-02-21 DIAGNOSIS — M96.1 FAILED NECK SYNDROME: ICD-10-CM

## 2024-02-21 DIAGNOSIS — G89.4 CHRONIC PAIN DISORDER: ICD-10-CM

## 2024-02-21 DIAGNOSIS — M79.7 FIBROMYALGIA: ICD-10-CM

## 2024-02-21 PROCEDURE — 3079F DIAST BP 80-89 MM HG: CPT | Performed by: INTERNAL MEDICINE

## 2024-02-21 PROCEDURE — 99214 OFFICE O/P EST MOD 30 MIN: CPT | Performed by: INTERNAL MEDICINE

## 2024-02-21 PROCEDURE — 3074F SYST BP LT 130 MM HG: CPT | Performed by: INTERNAL MEDICINE

## 2024-02-21 PROCEDURE — 1036F TOBACCO NON-USER: CPT | Performed by: INTERNAL MEDICINE

## 2024-02-21 ASSESSMENT — ENCOUNTER SYMPTOMS
WHEEZING: 0
NAUSEA: 0
COUGH: 0
BACK PAIN: 1
DIARRHEA: 0
CHILLS: 0
SORE THROAT: 0
ACTIVITY CHANGE: 0
SINUS PAIN: 0
SINUS PRESSURE: 0
FATIGUE: 0
NUMBNESS: 1
APPETITE CHANGE: 0
SHORTNESS OF BREATH: 0
WEAKNESS: 1
VOMITING: 0
ABDOMINAL DISTENTION: 0

## 2024-02-21 NOTE — PROGRESS NOTES
"Subjective   Patient ID: Julia Mcguire is a 63 y.o. female who presents for Follow-up (3 month).  HPI  Pt is here today for 3 mo follow up.  She had her social security disability.  Will have to find temporary insurance after April till medicare starts in.     Still have tremoring worse in right hand.     Review of Systems   Constitutional:  Negative for activity change, appetite change, chills and fatigue.   HENT:  Negative for congestion, postnasal drip, sinus pressure, sinus pain and sore throat.    Respiratory:  Negative for cough, shortness of breath and wheezing.    Cardiovascular:  Negative for chest pain and leg swelling.   Gastrointestinal:  Negative for abdominal distention, diarrhea, nausea and vomiting.   Musculoskeletal:  Positive for back pain.   Neurological:  Positive for weakness and numbness.       Objective   /80   Pulse 76   Ht 1.575 m (5' 2\")   Wt 73 kg (161 lb)   BMI 29.45 kg/m²     Physical Exam  Constitutional:       General: She is not in acute distress.     Appearance: Normal appearance.   HENT:      Head: Normocephalic.      Nose: Nose normal.      Mouth/Throat:      Pharynx: No oropharyngeal exudate.   Eyes:      General:         Right eye: No discharge.         Left eye: No discharge.      Extraocular Movements: Extraocular movements intact.      Pupils: Pupils are equal, round, and reactive to light.   Cardiovascular:      Rate and Rhythm: Normal rate and regular rhythm.      Heart sounds: No murmur heard.     No gallop.   Pulmonary:      Effort: Pulmonary effort is normal. No respiratory distress.      Breath sounds: Normal breath sounds. No wheezing.   Musculoskeletal:         General: No swelling. Normal range of motion.   Skin:     General: Skin is warm and dry.      Coloration: Skin is not jaundiced.   Neurological:      General: No focal deficit present.      Mental Status: She is alert and oriented to person, place, and time.      Cranial Nerves: No cranial nerve " deficit.   Psychiatric:         Mood and Affect: Mood normal.         Behavior: Behavior normal.           Assessment/Plan   Problem List Items Addressed This Visit       Benign essential hypertension - Primary    Cervical myelopathy (CMS/HCC)    Depression with anxiety    GERD (gastroesophageal reflux disease)    Hypercholesterolemia    Hypothyroidism    Fibromyalgia    FRANCES (obstructive sleep apnea)    Failed neck syndrome    Chronic pain disorder     HTN, HLD  - had cardiac cath   - had echocardiogram, EF 65-70%, impaired relaxation patter of left vetricular diastolic filling, bubble study positive for  right to left shunt  - following with cardiology   - on losartan 50-100mg po daily with hydrochlorothiazide   - on lipitor 80mg po daily      2. Cervical spinal stenosis, chronic radiculopathy   - following with neurosurgery and pain management    - off tramadol, gabapentin      3. Anxiety   - does think prozac has helped   - tried zoloft, wellbutrin, buspar, savella, vistaril    - still having stress with son with intermittent drug use   Final diagnoses:   [G95.9] Cervical myelopathy (CMS/HCC)   [I10] Benign essential hypertension   [E78.00] Hypercholesterolemia   [E03.9] Acquired hypothyroidism   [K21.9] Gastroesophageal reflux disease without esophagitis   [F41.8] Depression with anxiety   [M79.7] Fibromyalgia   [M96.1] Failed neck syndrome   [G89.4] Chronic pain disorder   [G47.33] FRANCES (obstructive sleep apnea)

## 2024-03-05 DIAGNOSIS — H44.009 EYE INFECTION, UNSPECIFIED LATERALITY: ICD-10-CM

## 2024-03-05 RX ORDER — POLYMYXIN B SULFATE AND TRIMETHOPRIM 1; 10000 MG/ML; [USP'U]/ML
1 SOLUTION OPHTHALMIC EVERY 4 HOURS
Qty: 10 ML | Refills: 0 | Status: SHIPPED | OUTPATIENT
Start: 2024-03-05 | End: 2024-04-02

## 2024-03-06 ENCOUNTER — PHARMACY VISIT (OUTPATIENT)
Dept: PHARMACY | Facility: CLINIC | Age: 64
End: 2024-03-06
Payer: COMMERCIAL

## 2024-03-06 ENCOUNTER — OFFICE VISIT (OUTPATIENT)
Dept: URGENT CARE | Facility: CLINIC | Age: 64
End: 2024-03-06
Payer: COMMERCIAL

## 2024-03-06 VITALS
HEIGHT: 62 IN | OXYGEN SATURATION: 94 % | BODY MASS INDEX: 30 KG/M2 | SYSTOLIC BLOOD PRESSURE: 145 MMHG | RESPIRATION RATE: 14 BRPM | DIASTOLIC BLOOD PRESSURE: 81 MMHG | HEART RATE: 86 BPM | WEIGHT: 163 LBS | TEMPERATURE: 98.3 F

## 2024-03-06 DIAGNOSIS — H10.13 ALLERGIC CONJUNCTIVITIS OF BOTH EYES: Primary | ICD-10-CM

## 2024-03-06 PROCEDURE — RXMED WILLOW AMBULATORY MEDICATION CHARGE

## 2024-03-06 PROCEDURE — 99213 OFFICE O/P EST LOW 20 MIN: CPT | Performed by: NURSE PRACTITIONER

## 2024-03-06 RX ORDER — PREDNISONE 20 MG/1
20 TABLET ORAL DAILY
Qty: 5 TABLET | Refills: 0 | Status: SHIPPED | OUTPATIENT
Start: 2024-03-06 | End: 2024-03-11

## 2024-03-06 RX ORDER — OFLOXACIN 3 MG/ML
1 SOLUTION/ DROPS OPHTHALMIC 3 TIMES DAILY
Qty: 5 ML | Refills: 0 | Status: SHIPPED | OUTPATIENT
Start: 2024-03-06 | End: 2024-03-23

## 2024-03-06 NOTE — PROGRESS NOTES
63 y.o. female presents for evaluation of bilateral eye irritation, redness and swelling for the past 2 weeks. Has a history of seasonal allergies. Does take daily antihistamine. No fever, visual changes, nasal congestion, sore throat, cough, or any other associated symptoms or complaints.      Vitals:    03/06/24 1039   BP: 145/81   Pulse: 86   Resp: 14   Temp: 36.8 °C (98.3 °F)   SpO2: 94%       Allergies   Allergen Reactions    Codeine Itching    Etodolac Nausea Only    Naproxen Other     GI problems    Sulfamethoxazole Unknown       Medication Documentation Review Audit       Reviewed by Tori Hernández MA (Medical Assistant) on 03/06/24 at 1038      Medication Order Taking? Sig Documenting Provider Last Dose Status   aspirin 81 mg EC tablet [id number] Yes Take 1 tablet (81 mg) by mouth once daily. Historical Provider, MD Taking Active   atorvastatin (Lipitor) 80 mg tablet [id number] Yes TAKE 1 TABLET BY MOUTH EVERY DAY AT BEDTIME Augustina Soto APRN-CNP, DNP Taking Active   FLUoxetine (PROzac) 20 mg capsule [id number] Yes Take 1 capsule (20 mg) by mouth once daily. Manisha Singh,  Taking Active   gabapentin (Neurontin) 300 mg capsule [id number] Yes Take 1 capsule (300 mg) by mouth 2 times a day. Josie Peralta PA-C Taking Active   hydroCHLOROthiazide (Microzide) 12.5 mg capsule [id number] Yes Take 1 capsule (12.5 mg) by mouth once daily. Manisha Singh,  Taking Active   hydrOXYzine pamoate (Vistaril) 25 mg capsule [id number] Yes TAKE 1 CAPSULE BY MOUTH 3 TIMES A DAY AS NEEDED FOR ITCHING OR ANXIETY. Manisha Singh,  Taking Active   levothyroxine (Synthroid, Levoxyl) 88 mcg tablet [id number] Yes Take 1 tablet (88 mcg) by mouth once daily. Manisha Singh DO Taking Active   losartan (Cozaar) 50 mg tablet [id number] Yes Take 1 tablet (50 mg) by mouth once daily. May take second tab if bp > 140/90 Manisha Singh, DO Taking Active   polymyxin B sulf-trimethoprim (Polytrim) ophthalmic solution  [id number] Yes Administer 1 drop into both eyes every 4 hours for 10 days. Manisha MATHEWS Seblechristine, DO Taking Active   tiZANidine (Zanaflex) 4 mg tablet [id number]  Take 1 tablet (4 mg) by mouth every 6 hours if needed for muscle spasms. Manisha Singh DO   24 2359   traMADol (Ultram) 50 mg tablet [id number] Yes TAKE 1 TABLET BY MOUTH EVERY DAY AS NEEDED Josie Peralta PA-C Taking Active   umeclidinium-vilanteroL (Anoro Ellipta) 62.5-25 mcg/actuation blister with device [id number] Yes Inhale 1 puff once daily. Historical Provider, MD Taking Active                    Past Medical History:   Diagnosis Date    Personal history of other diseases of the musculoskeletal system and connective tissue 2022    History of joint pain    Sleep apnea        Past Surgical History:   Procedure Laterality Date    CARDIAC CATHETERIZATION      OTHER SURGICAL HISTORY  2019    Appendectomy laparoscopic    OTHER SURGICAL HISTORY  2019    Dilation and curettage    OTHER SURGICAL HISTORY  2019    Tonsillectomy    OTHER SURGICAL HISTORY  2019    Colonoscopy    OTHER SURGICAL HISTORY  2019    Loop electrosurgical excision procedure    OTHER SURGICAL HISTORY  2019    Tubal ligation    OTHER SURGICAL HISTORY  2019    Cholecystectomy laparoscopic    OTHER SURGICAL HISTORY  03/10/2020    Carpal tunnel surgery       ROS  See HPI    Physical Exam  Vitals and nursing note reviewed.   Constitutional:       General: She is not in acute distress.     Appearance: Normal appearance. She is not ill-appearing, toxic-appearing or diaphoretic.   Eyes:      General: Allergic shiner present.      Extraocular Movements: Extraocular movements intact.      Conjunctiva/sclera:      Right eye: Right conjunctiva is injected (mildly). No chemosis.     Left eye: Left conjunctiva is injected (mildly). No chemosis.     Pupils: Pupils are equal, round, and reactive to light.      Comments: Edematous L upper  eyelid   Neurological:      Mental Status: She is alert.         Assessment/Plan/MDM  Tuesday was seen today for earache and eye pain.  Diagnoses and all orders for this visit:  Allergic conjunctivitis of both eyes (Primary)  -     predniSONE (Deltasone) 20 mg tablet; Take 1 tablet (20 mg) by mouth once daily for 5 days.  -     ofloxacin (Ocuflox) 0.3 % ophthalmic solution; Administer 1 drop into both eyes 3 times a day for 7 days.    Patient's clinical presentation is otherwise unremarkable at this time. Patient is discharged with instructions to follow-up with primary care or seek emergency medical attention for worsening symptoms or any new concerns.      Paul Mckeon, CNP  [facility]  [phone]

## 2024-03-23 ENCOUNTER — PHARMACY VISIT (OUTPATIENT)
Dept: PHARMACY | Facility: CLINIC | Age: 64
End: 2024-03-23
Payer: COMMERCIAL

## 2024-03-23 PROCEDURE — RXMED WILLOW AMBULATORY MEDICATION CHARGE

## 2024-03-24 ENCOUNTER — PHARMACY VISIT (OUTPATIENT)
Dept: PHARMACY | Facility: CLINIC | Age: 64
End: 2024-03-24
Payer: COMMERCIAL

## 2024-03-24 ENCOUNTER — HOSPITAL ENCOUNTER (EMERGENCY)
Facility: HOSPITAL | Age: 64
Discharge: HOME | End: 2024-03-24
Attending: EMERGENCY MEDICINE
Payer: COMMERCIAL

## 2024-03-24 ENCOUNTER — APPOINTMENT (OUTPATIENT)
Dept: RADIOLOGY | Facility: HOSPITAL | Age: 64
End: 2024-03-24
Payer: COMMERCIAL

## 2024-03-24 VITALS
HEART RATE: 62 BPM | WEIGHT: 160 LBS | BODY MASS INDEX: 29.44 KG/M2 | TEMPERATURE: 96.9 F | DIASTOLIC BLOOD PRESSURE: 95 MMHG | SYSTOLIC BLOOD PRESSURE: 145 MMHG | HEIGHT: 62 IN | OXYGEN SATURATION: 96 % | RESPIRATION RATE: 16 BRPM

## 2024-03-24 DIAGNOSIS — L03.213 PRESEPTAL CELLULITIS: Primary | ICD-10-CM

## 2024-03-24 LAB
ALBUMIN SERPL BCP-MCNC: 3.7 G/DL (ref 3.4–5)
ALP SERPL-CCNC: 64 U/L (ref 33–136)
ALT SERPL W P-5'-P-CCNC: 24 U/L (ref 7–45)
ANION GAP SERPL CALC-SCNC: 11 MMOL/L (ref 10–20)
AST SERPL W P-5'-P-CCNC: 17 U/L (ref 9–39)
BILIRUB SERPL-MCNC: 0.6 MG/DL (ref 0–1.2)
BUN SERPL-MCNC: 21 MG/DL (ref 6–23)
CALCIUM SERPL-MCNC: 8.9 MG/DL (ref 8.6–10.3)
CHLORIDE SERPL-SCNC: 101 MMOL/L (ref 98–107)
CO2 SERPL-SCNC: 27 MMOL/L (ref 21–32)
CREAT SERPL-MCNC: 0.99 MG/DL (ref 0.5–1.05)
EGFRCR SERPLBLD CKD-EPI 2021: 64 ML/MIN/1.73M*2
ERYTHROCYTE [DISTWIDTH] IN BLOOD BY AUTOMATED COUNT: 14.1 % (ref 11.5–14.5)
GLUCOSE SERPL-MCNC: 95 MG/DL (ref 74–99)
HCT VFR BLD AUTO: 41.8 % (ref 36–46)
HGB BLD-MCNC: 13.9 G/DL (ref 12–16)
MCH RBC QN AUTO: 30.1 PG (ref 26–34)
MCHC RBC AUTO-ENTMCNC: 33.3 G/DL (ref 32–36)
MCV RBC AUTO: 91 FL (ref 80–100)
NRBC BLD-RTO: 0 /100 WBCS (ref 0–0)
PLATELET # BLD AUTO: 230 X10*3/UL (ref 150–450)
POTASSIUM SERPL-SCNC: 3.6 MMOL/L (ref 3.5–5.3)
PROT SERPL-MCNC: 6.3 G/DL (ref 6.4–8.2)
RBC # BLD AUTO: 4.62 X10*6/UL (ref 4–5.2)
SODIUM SERPL-SCNC: 135 MMOL/L (ref 136–145)
WBC # BLD AUTO: 5.8 X10*3/UL (ref 4.4–11.3)

## 2024-03-24 PROCEDURE — 96360 HYDRATION IV INFUSION INIT: CPT

## 2024-03-24 PROCEDURE — 36415 COLL VENOUS BLD VENIPUNCTURE: CPT | Performed by: PHYSICIAN ASSISTANT

## 2024-03-24 PROCEDURE — 2550000001 HC RX 255 CONTRASTS: Performed by: PHYSICIAN ASSISTANT

## 2024-03-24 PROCEDURE — 85027 COMPLETE CBC AUTOMATED: CPT | Performed by: PHYSICIAN ASSISTANT

## 2024-03-24 PROCEDURE — 2500000004 HC RX 250 GENERAL PHARMACY W/ HCPCS (ALT 636 FOR OP/ED): Performed by: PHYSICIAN ASSISTANT

## 2024-03-24 PROCEDURE — 70487 CT MAXILLOFACIAL W/DYE: CPT

## 2024-03-24 PROCEDURE — 99284 EMERGENCY DEPT VISIT MOD MDM: CPT | Mod: 25

## 2024-03-24 PROCEDURE — RXMED WILLOW AMBULATORY MEDICATION CHARGE

## 2024-03-24 PROCEDURE — 80053 COMPREHEN METABOLIC PANEL: CPT | Performed by: PHYSICIAN ASSISTANT

## 2024-03-24 PROCEDURE — 2500000001 HC RX 250 WO HCPCS SELF ADMINISTERED DRUGS (ALT 637 FOR MEDICARE OP): Performed by: PHYSICIAN ASSISTANT

## 2024-03-24 PROCEDURE — 70487 CT MAXILLOFACIAL W/DYE: CPT | Performed by: RADIOLOGY

## 2024-03-24 RX ORDER — IBUPROFEN 600 MG/1
600 TABLET ORAL EVERY 6 HOURS PRN
Qty: 30 TABLET | Refills: 0 | Status: SHIPPED | OUTPATIENT
Start: 2024-03-24

## 2024-03-24 RX ORDER — AMOXICILLIN AND CLAVULANATE POTASSIUM 875; 125 MG/1; MG/1
1 TABLET, FILM COATED ORAL EVERY 12 HOURS
Qty: 14 TABLET | Refills: 0 | Status: SHIPPED | OUTPATIENT
Start: 2024-03-24 | End: 2024-03-31

## 2024-03-24 RX ORDER — FLUCONAZOLE 150 MG/1
150 TABLET ORAL ONCE
Qty: 1 TABLET | Refills: 0 | Status: SHIPPED | OUTPATIENT
Start: 2024-03-24 | End: 2024-03-25

## 2024-03-24 RX ORDER — AMOXICILLIN AND CLAVULANATE POTASSIUM 875; 125 MG/1; MG/1
1 TABLET, FILM COATED ORAL ONCE
Status: COMPLETED | OUTPATIENT
Start: 2024-03-24 | End: 2024-03-24

## 2024-03-24 RX ADMIN — AMOXICILLIN AND CLAVULANATE POTASSIUM 1 TABLET: 875; 125 TABLET, FILM COATED ORAL at 14:00

## 2024-03-24 RX ADMIN — IOHEXOL 68 ML: 350 INJECTION, SOLUTION INTRAVENOUS at 13:25

## 2024-03-24 RX ADMIN — SODIUM CHLORIDE 500 ML: 9 INJECTION, SOLUTION INTRAVENOUS at 12:58

## 2024-03-24 ASSESSMENT — COLUMBIA-SUICIDE SEVERITY RATING SCALE - C-SSRS
6. HAVE YOU EVER DONE ANYTHING, STARTED TO DO ANYTHING, OR PREPARED TO DO ANYTHING TO END YOUR LIFE?: NO
2. HAVE YOU ACTUALLY HAD ANY THOUGHTS OF KILLING YOURSELF?: NO
1. IN THE PAST MONTH, HAVE YOU WISHED YOU WERE DEAD OR WISHED YOU COULD GO TO SLEEP AND NOT WAKE UP?: NO

## 2024-03-24 ASSESSMENT — PAIN DESCRIPTION - ORIENTATION: ORIENTATION: RIGHT;LEFT

## 2024-03-24 ASSESSMENT — PAIN - FUNCTIONAL ASSESSMENT: PAIN_FUNCTIONAL_ASSESSMENT: 0-10

## 2024-03-24 ASSESSMENT — PAIN SCALES - GENERAL: PAINLEVEL_OUTOF10: 6

## 2024-03-24 ASSESSMENT — PAIN DESCRIPTION - LOCATION: LOCATION: EYE

## 2024-03-24 NOTE — DISCHARGE INSTRUCTIONS
You may continue the eyedrops as directed.  You need to follow-up with an eye specialist.  Local ophthalmologist is listed above the you can follow-up with or you may choose another ophthalmologist and follow-up with them within the next 1 to 2 days.

## 2024-03-24 NOTE — ED PROVIDER NOTES
"HPI   Chief Complaint   Patient presents with    Eye Pain     \"Eye infection  for 2 weeks\" to bilat eyes/ Has been on atb and steroid eye drops not helping. Pain and redness to both eyes       Patient presents with ongoing bilateral eye irritation.  This is been ongoing for 2 to 3 weeks now.  States that she was seen at an urgent care placed on eyedrops and p.o. steroids.  States she has not noticed any improvement.  She did not follow-up with her family doctor she did not follow-up with an eye specialist.  States a friend gave her concerned that she had zoster in her eyes and she should be seen immediately.  States that they itch and she points to the left medial canthus as the area of most itching.  She states she has occasional burning and stinging.  Denies any fever or chills.  No nasal drainage or cough.  No trouble with her vision.      History provided by:  Patient                      No data recorded                   Patient History   Past Medical History:   Diagnosis Date    Personal history of other diseases of the musculoskeletal system and connective tissue 04/28/2022    History of joint pain    Sleep apnea      Past Surgical History:   Procedure Laterality Date    CARDIAC CATHETERIZATION      OTHER SURGICAL HISTORY  11/27/2019    Appendectomy laparoscopic    OTHER SURGICAL HISTORY  11/27/2019    Dilation and curettage    OTHER SURGICAL HISTORY  11/27/2019    Tonsillectomy    OTHER SURGICAL HISTORY  11/27/2019    Colonoscopy    OTHER SURGICAL HISTORY  11/27/2019    Loop electrosurgical excision procedure    OTHER SURGICAL HISTORY  11/27/2019    Tubal ligation    OTHER SURGICAL HISTORY  12/01/2019    Cholecystectomy laparoscopic    OTHER SURGICAL HISTORY  03/10/2020    Carpal tunnel surgery     Family History   Problem Relation Name Age of Onset    Other (Bilateral carotid artery stenosis) Mother      Colonic polyp Mother      Thyroid disease Mother      Cervical cancer Mother          Primary    Other " (cva) Mother      Osteoporosis Mother      Emphysema Father      Stroke Sister      Colonic polyp Sister      Diabetes Sister      Hypertension Sister      Heart attack Sister      Heart attack Sister      Other (Cardiac disorder) Half-Brother      Other (cardiac disorder) Other grandmother     Lung cancer Other grandmother     Throat cancer Other grandfather      Social History     Tobacco Use    Smoking status: Former     Types: Cigarettes     Passive exposure: Past    Smokeless tobacco: Never   Substance Use Topics    Alcohol use: Yes     Comment: occasional    Drug use: Never       Physical Exam   ED Triage Vitals   Temperature Heart Rate Respirations BP   03/24/24 1254 03/24/24 1254 03/24/24 1254 03/24/24 1254   36.1 °C (96.9 °F) 77 16 164/80      Pulse Ox Temp Source Heart Rate Source Patient Position   03/24/24 1254 03/24/24 1254 03/24/24 1326 --   95 % Temporal Monitor       BP Location FiO2 (%)     -- --             Physical Exam  Vitals and nursing note reviewed.   Constitutional:       General: She is not in acute distress.     Appearance: Normal appearance. She is normal weight. She is not ill-appearing or toxic-appearing.   HENT:      Head: Normocephalic and atraumatic.      Right Ear: Tympanic membrane, ear canal and external ear normal.      Left Ear: Tympanic membrane, ear canal and external ear normal.      Nose: Nose normal.      Mouth/Throat:      Mouth: Mucous membranes are moist.      Pharynx: Oropharynx is clear. No oropharyngeal exudate or posterior oropharyngeal erythema.   Eyes:      General: No scleral icterus.     Extraocular Movements: Extraocular movements intact.      Conjunctiva/sclera: Conjunctivae normal.      Pupils: Pupils are equal, round, and reactive to light.     Cardiovascular:      Rate and Rhythm: Normal rate and regular rhythm.      Heart sounds: Normal heart sounds.   Pulmonary:      Effort: Pulmonary effort is normal.      Breath sounds: Normal breath sounds and air  entry.   Musculoskeletal:      Right lower leg: No edema.      Left lower leg: No edema.   Skin:     General: Skin is warm.      Capillary Refill: Capillary refill takes less than 2 seconds.   Neurological:      General: No focal deficit present.      Mental Status: She is alert and oriented to person, place, and time.      Cranial Nerves: No cranial nerve deficit.      Motor: No weakness.      Gait: Gait normal.   Psychiatric:         Attention and Perception: Attention and perception normal.         Mood and Affect: Mood and affect normal.         Speech: Speech normal.         Behavior: Behavior normal. Behavior is cooperative.         Thought Content: Thought content normal.         Cognition and Memory: Cognition and memory normal.         Judgment: Judgment normal.         ED Course & MDM   Diagnoses as of 03/24/24 1408   Preseptal cellulitis       Medical Decision Making  Patient presents with ongoing bilateral eye irritation.  This is been ongoing for 2 to 3 weeks now.  States that she was seen at an urgent care placed on eyedrops and p.o. steroids.  States she has not noticed any improvement.  She did not follow-up with her family doctor she did not follow-up with an eye specialist.  States a friend gave her concerned that she had zoster in her eyes and she should be seen immediately.  States that they itch and she points to the left medial canthus as the area of most itching.  She states she has occasional burning and stinging.  Denies any fever or chills.  No nasal drainage or cough.  No trouble with her vision.    Ddx: Sinusitis, postseptal cellulitis, preseptal cellulitis, contact dermatitis, other   as patient has failed initial outpatient treatment will obtain labs and CT studies  Labs do not show any concern  CT shows mild preseptal inflammation.  We will treat as a preseptal cellulitis.  Treatment of choice is Augmentin.  Patient also requesting Diflucan for prevention of vaginal yeast infection  after the antibiotics.  This was also wrote for her.  We will not repeat her steroids as she just got off a short course.  Therefore recommended NSAIDs such as Motrin for pain and inflammation.  Prescription was wrote for patient if she would like to fill this as well.  Patient to follow-up with ophthalmology.  She is given Dr. Mcclendon's name and phone number.  Patient did give note of some concern with networks with her insurance.  Unfortunately we cannot know networks and insurance coverage is based on each individual patient and she needs to follow-up with ophthalmology no matter her network.  She voices understanding of this and agreement to plan.  Patient discharged home in improved and stable condition        Problems Addressed:  Preseptal cellulitis: undiagnosed new problem with uncertain prognosis     Details: Treatment with Augmentin p.o.    Amount and/or Complexity of Data Reviewed  Labs: ordered. Decision-making details documented in ED Course.  Radiology: ordered and independent interpretation performed. Decision-making details documented in ED Course.     Details: Preseptal inflammation    Risk  OTC drugs.  Prescription drug management.  Diagnosis or treatment significantly limited by social determinants of health.  Risk Details: Patient's refusal to follow-up as directed        Procedure  Procedures     Liliana Cisneros PA-C  03/24/24 6147

## 2024-04-25 PROCEDURE — RXMED WILLOW AMBULATORY MEDICATION CHARGE

## 2024-04-28 ENCOUNTER — PHARMACY VISIT (OUTPATIENT)
Dept: PHARMACY | Facility: CLINIC | Age: 64
End: 2024-04-28
Payer: COMMERCIAL

## 2024-04-29 ENCOUNTER — LAB (OUTPATIENT)
Dept: LAB | Facility: LAB | Age: 64
End: 2024-04-29
Payer: COMMERCIAL

## 2024-04-29 DIAGNOSIS — M79.676 PAIN OF TOE, UNSPECIFIED LATERALITY: ICD-10-CM

## 2024-04-29 LAB — URATE SERPL-MCNC: 7.4 MG/DL (ref 2.3–6.7)

## 2024-04-29 PROCEDURE — 36415 COLL VENOUS BLD VENIPUNCTURE: CPT

## 2024-04-29 PROCEDURE — 84550 ASSAY OF BLOOD/URIC ACID: CPT

## 2024-05-02 ENCOUNTER — TELEMEDICINE (OUTPATIENT)
Dept: PRIMARY CARE | Facility: CLINIC | Age: 64
End: 2024-05-02
Payer: COMMERCIAL

## 2024-05-02 DIAGNOSIS — E03.9 ACQUIRED HYPOTHYROIDISM: ICD-10-CM

## 2024-05-02 DIAGNOSIS — F41.8 DEPRESSION WITH ANXIETY: ICD-10-CM

## 2024-05-02 DIAGNOSIS — K21.9 GASTROESOPHAGEAL REFLUX DISEASE WITHOUT ESOPHAGITIS: ICD-10-CM

## 2024-05-02 DIAGNOSIS — I10 BENIGN ESSENTIAL HYPERTENSION: ICD-10-CM

## 2024-05-02 DIAGNOSIS — G89.4 CHRONIC PAIN DISORDER: ICD-10-CM

## 2024-05-02 DIAGNOSIS — E78.00 HYPERCHOLESTEROLEMIA: ICD-10-CM

## 2024-05-02 DIAGNOSIS — M79.7 FIBROMYALGIA: ICD-10-CM

## 2024-05-02 DIAGNOSIS — Z91.89 10 YEAR RISK OF MI OR STROKE < 7.5%: ICD-10-CM

## 2024-05-02 DIAGNOSIS — F41.9 ANXIETY: ICD-10-CM

## 2024-05-02 DIAGNOSIS — M1A.9XX0 CHRONIC GOUT WITHOUT TOPHUS, UNSPECIFIED CAUSE, UNSPECIFIED SITE: Primary | ICD-10-CM

## 2024-05-02 DIAGNOSIS — E55.9 VITAMIN D DEFICIENCY: ICD-10-CM

## 2024-05-02 PROCEDURE — 99213 OFFICE O/P EST LOW 20 MIN: CPT | Performed by: INTERNAL MEDICINE

## 2024-05-02 PROCEDURE — 1036F TOBACCO NON-USER: CPT | Performed by: INTERNAL MEDICINE

## 2024-05-02 RX ORDER — COLCHICINE 0.6 MG/1
0.6 TABLET ORAL 2 TIMES DAILY PRN
Qty: 30 TABLET | Refills: 5 | Status: SHIPPED | OUTPATIENT
Start: 2024-05-02 | End: 2024-10-29

## 2024-05-02 RX ORDER — FLUOXETINE HYDROCHLORIDE 40 MG/1
40 CAPSULE ORAL DAILY
Qty: 30 CAPSULE | Refills: 1 | Status: SHIPPED | OUTPATIENT
Start: 2024-05-02 | End: 2024-07-01

## 2024-05-02 ASSESSMENT — ENCOUNTER SYMPTOMS
LEG PAIN: 1
BACK PAIN: 1
ABDOMINAL PAIN: 1
WEAKNESS: 1

## 2024-05-02 NOTE — PROGRESS NOTES
Subjective   Patient ID: Julia Mcguire is a 63 y.o. female who presents for followup     Back Pain  The current episode started more than 1 year ago. The problem occurs daily. The problem has been gradually worsening since onset. The pain is present in the sacro-iliac and thoracic spine. The quality of the pain is described as shooting and stabbing. The pain does not radiate. The pain is at a severity of 6/10. The pain is The same all the time. The symptoms are aggravated by bending. Stiffness is present In the morning and all day. Associated symptoms include abdominal pain, bladder incontinence, leg pain and weakness. Risk factors include obesity.     Patient is here today for virtual  follow up     Pt and physician are at two separate physical locations.   Pt was verbally consented for the encounter.     PT reports continued back and neck pain,   Worsening anxiety with stress in life, stress with son,     Review of Systems   Gastrointestinal:  Positive for abdominal pain.   Genitourinary:  Positive for bladder incontinence.   Musculoskeletal:  Positive for back pain.   Neurological:  Positive for weakness.       Objective   There were no vitals taken for this visit.    Physical Exam  No physical exam was completed due to being virtual visit.     Assessment/Plan   Problem List Items Addressed This Visit       Anxiety    Relevant Medications    FLUoxetine (PROzac) 40 mg capsule    Benign essential hypertension    Depression with anxiety    GERD (gastroesophageal reflux disease)    Hypercholesterolemia    Hypothyroidism    Fibromyalgia    Vitamin D deficiency    Chronic pain disorder    10 year risk of MI or stroke < 7.5%     Other Visit Diagnoses       Chronic gout without tophus, unspecified cause, unspecified site    -  Primary    Relevant Medications    colchicine 0.6 mg tablet        HTN, HLD  - had cardiac cath   - had echocardiogram, EF 65-70%, impaired relaxation patter of left vetricular diastolic  filling, bubble study positive for  right to left shunt  - following with cardiology   - on losartan 50-100mg po daily with hydrochlorothiazide   - on lipitor 80mg po daily      2. Cervical spinal stenosis, chronic radiculopathy   - following with neurosurgery and pain management    - off tramadol, gabapentin      3. Anxiety   - increase prozac to 40mg po daily   - tried zoloft, wellbutrin, buspar, savella, vistaril    - still having stress with son with intermittent drug use     4. Uric acid high   - sent colchicine to use prn if joints get red, hot and swollen   Final diagnoses:   [M1A.9XX0] Chronic gout without tophus, unspecified cause, unspecified site   [F41.9] Anxiety   [Z91.89] 10 year risk of MI or stroke < 7.5%   [I10] Benign essential hypertension   [E78.00] Hypercholesterolemia   [E03.9] Acquired hypothyroidism   [E55.9] Vitamin D deficiency   [K21.9] Gastroesophageal reflux disease without esophagitis   [F41.8] Depression with anxiety   [M79.7] Fibromyalgia   [G89.4] Chronic pain disorder

## 2024-05-03 ENCOUNTER — APPOINTMENT (OUTPATIENT)
Dept: PRIMARY CARE | Facility: CLINIC | Age: 64
End: 2024-05-03
Payer: COMMERCIAL

## 2024-05-26 ENCOUNTER — HOSPITAL ENCOUNTER (EMERGENCY)
Facility: HOSPITAL | Age: 64
Discharge: HOME | End: 2024-05-26
Attending: EMERGENCY MEDICINE
Payer: COMMERCIAL

## 2024-05-26 ENCOUNTER — APPOINTMENT (OUTPATIENT)
Dept: RADIOLOGY | Facility: HOSPITAL | Age: 64
End: 2024-05-26
Payer: COMMERCIAL

## 2024-05-26 ENCOUNTER — HOSPITAL ENCOUNTER (OUTPATIENT)
Dept: CARDIOLOGY | Facility: HOSPITAL | Age: 64
Discharge: HOME | End: 2024-05-26
Payer: COMMERCIAL

## 2024-05-26 VITALS
BODY MASS INDEX: 30 KG/M2 | TEMPERATURE: 98.5 F | RESPIRATION RATE: 14 BRPM | WEIGHT: 163 LBS | DIASTOLIC BLOOD PRESSURE: 77 MMHG | SYSTOLIC BLOOD PRESSURE: 132 MMHG | HEIGHT: 62 IN | HEART RATE: 64 BPM | OXYGEN SATURATION: 93 %

## 2024-05-26 DIAGNOSIS — S22.31XA CLOSED FRACTURE OF ONE RIB OF RIGHT SIDE, INITIAL ENCOUNTER: Primary | ICD-10-CM

## 2024-05-26 LAB
APPEARANCE UR: CLEAR
BACTERIA #/AREA URNS AUTO: ABNORMAL /HPF
BILIRUB UR STRIP.AUTO-MCNC: NEGATIVE MG/DL
COLOR UR: YELLOW
GLUCOSE UR STRIP.AUTO-MCNC: NEGATIVE MG/DL
KETONES UR STRIP.AUTO-MCNC: NEGATIVE MG/DL
LEUKOCYTE ESTERASE UR QL STRIP.AUTO: ABNORMAL
MUCOUS THREADS #/AREA URNS AUTO: ABNORMAL /LPF
NITRITE UR QL STRIP.AUTO: NEGATIVE
PH UR STRIP.AUTO: 6 [PH]
PROT UR STRIP.AUTO-MCNC: NEGATIVE MG/DL
RBC # UR STRIP.AUTO: ABNORMAL /UL
RBC #/AREA URNS AUTO: ABNORMAL /HPF
SP GR UR STRIP.AUTO: 1.01
SQUAMOUS #/AREA URNS AUTO: ABNORMAL /HPF
UROBILINOGEN UR STRIP.AUTO-MCNC: <2 MG/DL
WBC #/AREA URNS AUTO: ABNORMAL /HPF

## 2024-05-26 PROCEDURE — 81001 URINALYSIS AUTO W/SCOPE: CPT | Performed by: EMERGENCY MEDICINE

## 2024-05-26 PROCEDURE — 94664 DEMO&/EVAL PT USE INHALER: CPT

## 2024-05-26 PROCEDURE — 2500000004 HC RX 250 GENERAL PHARMACY W/ HCPCS (ALT 636 FOR OP/ED): Performed by: EMERGENCY MEDICINE

## 2024-05-26 PROCEDURE — 9420000001 HC RT PATIENT EDUCATION 5 MIN

## 2024-05-26 PROCEDURE — 99283 EMERGENCY DEPT VISIT LOW MDM: CPT

## 2024-05-26 PROCEDURE — 93005 ELECTROCARDIOGRAM TRACING: CPT

## 2024-05-26 PROCEDURE — 71101 X-RAY EXAM UNILAT RIBS/CHEST: CPT | Mod: RT

## 2024-05-26 PROCEDURE — 71101 X-RAY EXAM UNILAT RIBS/CHEST: CPT | Mod: RIGHT SIDE | Performed by: STUDENT IN AN ORGANIZED HEALTH CARE EDUCATION/TRAINING PROGRAM

## 2024-05-26 PROCEDURE — 96372 THER/PROPH/DIAG INJ SC/IM: CPT | Performed by: EMERGENCY MEDICINE

## 2024-05-26 PROCEDURE — 87086 URINE CULTURE/COLONY COUNT: CPT | Mod: SAMLAB | Performed by: EMERGENCY MEDICINE

## 2024-05-26 RX ORDER — OXYCODONE AND ACETAMINOPHEN 5; 325 MG/1; MG/1
1 TABLET ORAL 4 TIMES DAILY
Qty: 12 TABLET | Refills: 0 | Status: SHIPPED | OUTPATIENT
Start: 2024-05-26 | End: 2024-05-29

## 2024-05-26 RX ORDER — HYDROMORPHONE HYDROCHLORIDE 1 MG/ML
1 INJECTION, SOLUTION INTRAMUSCULAR; INTRAVENOUS; SUBCUTANEOUS ONCE
Status: COMPLETED | OUTPATIENT
Start: 2024-05-26 | End: 2024-05-26

## 2024-05-26 RX ORDER — OXYCODONE AND ACETAMINOPHEN 5; 325 MG/1; MG/1
1 TABLET ORAL 4 TIMES DAILY
Qty: 12 TABLET | Refills: 0 | Status: SHIPPED | OUTPATIENT
Start: 2024-05-26 | End: 2024-05-26

## 2024-05-26 RX ADMIN — HYDROMORPHONE HYDROCHLORIDE 1 MG: 1 INJECTION, SOLUTION INTRAMUSCULAR; INTRAVENOUS; SUBCUTANEOUS at 15:40

## 2024-05-26 ASSESSMENT — ENCOUNTER SYMPTOMS
CHILLS: 0
FEVER: 0
FLANK PAIN: 0
NAUSEA: 0
MYALGIAS: 1
HEMATOLOGIC/LYMPHATIC NEGATIVE: 1
ABDOMINAL PAIN: 0
DYSURIA: 0
ARTHRALGIAS: 0
PSYCHIATRIC NEGATIVE: 1
LIGHT-HEADEDNESS: 0
SHORTNESS OF BREATH: 0
HEMATURIA: 1
VOMITING: 0
PALPITATIONS: 0
EYES NEGATIVE: 1
DIZZINESS: 0

## 2024-05-26 ASSESSMENT — PAIN - FUNCTIONAL ASSESSMENT
PAIN_FUNCTIONAL_ASSESSMENT: 0-10
PAIN_FUNCTIONAL_ASSESSMENT: 0-10

## 2024-05-26 ASSESSMENT — PAIN DESCRIPTION - PAIN TYPE: TYPE: ACUTE PAIN

## 2024-05-26 ASSESSMENT — PAIN DESCRIPTION - LOCATION: LOCATION: RIB CAGE

## 2024-05-26 ASSESSMENT — PAIN SCALES - GENERAL
PAINLEVEL_OUTOF10: 5 - MODERATE PAIN
PAINLEVEL_OUTOF10: 10 - WORST POSSIBLE PAIN
PAINLEVEL_OUTOF10: 10 - WORST POSSIBLE PAIN

## 2024-05-26 ASSESSMENT — PAIN DESCRIPTION - DESCRIPTORS: DESCRIPTORS: OTHER (COMMENT)

## 2024-05-26 ASSESSMENT — PAIN DESCRIPTION - ONSET: ONSET: SUDDEN

## 2024-05-26 ASSESSMENT — PAIN DESCRIPTION - FREQUENCY: FREQUENCY: CONSTANT/CONTINUOUS

## 2024-05-26 ASSESSMENT — PAIN DESCRIPTION - ORIENTATION: ORIENTATION: RIGHT;MID
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
## 2024-05-27 LAB — HOLD SPECIMEN: NORMAL

## 2024-05-28 LAB
ATRIAL RATE: 74 BPM
BACTERIA UR CULT: NORMAL
P AXIS: 68 DEGREES
P OFFSET: 195 MS
P ONSET: 150 MS
PR INTERVAL: 136 MS
Q ONSET: 218 MS
QRS COUNT: 13 BEATS
QRS DURATION: 82 MS
QT INTERVAL: 382 MS
QTC CALCULATION(BAZETT): 424 MS
QTC FREDERICIA: 410 MS
R AXIS: 63 DEGREES
T AXIS: 43 DEGREES
T OFFSET: 409 MS
VENTRICULAR RATE: 74 BPM

## 2024-07-18 ENCOUNTER — TELEPHONE (OUTPATIENT)
Dept: PRIMARY CARE | Facility: CLINIC | Age: 64
End: 2024-07-18
Payer: COMMERCIAL

## 2024-07-18 DIAGNOSIS — R29.898 WEAKNESS OF BOTH LOWER EXTREMITIES: Primary | ICD-10-CM

## 2024-07-18 NOTE — TELEPHONE ENCOUNTER
Pt called with symptoms of:    Imbalance  Weakness in her legs  Nausea and vomiting yesterday     Asking if you can send in blood work    No fever/ BP normal    Concerned about her leg weakness and not being able to drive; she states it feels different from her spine issues

## 2024-07-19 ENCOUNTER — LAB (OUTPATIENT)
Dept: LAB | Facility: LAB | Age: 64
End: 2024-07-19
Payer: COMMERCIAL

## 2024-07-19 DIAGNOSIS — R29.898 WEAKNESS OF BOTH LOWER EXTREMITIES: ICD-10-CM

## 2024-07-19 LAB
ALBUMIN SERPL BCP-MCNC: 4.3 G/DL (ref 3.4–5)
ALP SERPL-CCNC: 87 U/L (ref 33–136)
ALT SERPL W P-5'-P-CCNC: 31 U/L (ref 7–45)
ANION GAP SERPL CALC-SCNC: 11 MMOL/L (ref 10–20)
AST SERPL W P-5'-P-CCNC: 23 U/L (ref 9–39)
BASOPHILS # BLD AUTO: 0.05 X10*3/UL (ref 0–0.1)
BASOPHILS NFR BLD AUTO: 0.9 %
BILIRUB SERPL-MCNC: 0.4 MG/DL (ref 0–1.2)
BUN SERPL-MCNC: 30 MG/DL (ref 6–23)
CALCIUM SERPL-MCNC: 10 MG/DL (ref 8.6–10.3)
CHLORIDE SERPL-SCNC: 102 MMOL/L (ref 98–107)
CO2 SERPL-SCNC: 27 MMOL/L (ref 21–32)
CREAT SERPL-MCNC: 0.92 MG/DL (ref 0.5–1.05)
CRP SERPL-MCNC: 0.77 MG/DL
EGFRCR SERPLBLD CKD-EPI 2021: 70 ML/MIN/1.73M*2
EOSINOPHIL # BLD AUTO: 0.15 X10*3/UL (ref 0–0.7)
EOSINOPHIL NFR BLD AUTO: 2.7 %
ERYTHROCYTE [DISTWIDTH] IN BLOOD BY AUTOMATED COUNT: 13.5 % (ref 11.5–14.5)
ERYTHROCYTE [SEDIMENTATION RATE] IN BLOOD BY WESTERGREN METHOD: 34 MM/H (ref 0–30)
GLUCOSE SERPL-MCNC: 114 MG/DL (ref 74–99)
HCT VFR BLD AUTO: 43.9 % (ref 36–46)
HGB BLD-MCNC: 14.1 G/DL (ref 12–16)
IMM GRANULOCYTES # BLD AUTO: 0.01 X10*3/UL (ref 0–0.7)
IMM GRANULOCYTES NFR BLD AUTO: 0.2 % (ref 0–0.9)
LYMPHOCYTES # BLD AUTO: 1.98 X10*3/UL (ref 1.2–4.8)
LYMPHOCYTES NFR BLD AUTO: 36.3 %
MCH RBC QN AUTO: 29.4 PG (ref 26–34)
MCHC RBC AUTO-ENTMCNC: 32.1 G/DL (ref 32–36)
MCV RBC AUTO: 92 FL (ref 80–100)
MONOCYTES # BLD AUTO: 0.53 X10*3/UL (ref 0.1–1)
MONOCYTES NFR BLD AUTO: 9.7 %
NEUTROPHILS # BLD AUTO: 2.74 X10*3/UL (ref 1.2–7.7)
NEUTROPHILS NFR BLD AUTO: 50.2 %
NRBC BLD-RTO: 0 /100 WBCS (ref 0–0)
PLATELET # BLD AUTO: 263 X10*3/UL (ref 150–450)
POTASSIUM SERPL-SCNC: 4.2 MMOL/L (ref 3.5–5.3)
PROT SERPL-MCNC: 7.3 G/DL (ref 6.4–8.2)
RBC # BLD AUTO: 4.79 X10*6/UL (ref 4–5.2)
SODIUM SERPL-SCNC: 136 MMOL/L (ref 136–145)
VIT B12 SERPL-MCNC: 392 PG/ML (ref 211–911)
WBC # BLD AUTO: 5.5 X10*3/UL (ref 4.4–11.3)

## 2024-07-19 PROCEDURE — 85025 COMPLETE CBC W/AUTO DIFF WBC: CPT

## 2024-07-19 PROCEDURE — 86038 ANTINUCLEAR ANTIBODIES: CPT

## 2024-07-19 PROCEDURE — 82607 VITAMIN B-12: CPT

## 2024-07-19 PROCEDURE — 86235 NUCLEAR ANTIGEN ANTIBODY: CPT

## 2024-07-19 PROCEDURE — 80053 COMPREHEN METABOLIC PANEL: CPT

## 2024-07-19 PROCEDURE — 86225 DNA ANTIBODY NATIVE: CPT

## 2024-07-19 PROCEDURE — 86140 C-REACTIVE PROTEIN: CPT

## 2024-07-19 PROCEDURE — 85652 RBC SED RATE AUTOMATED: CPT

## 2024-07-23 LAB
ANA PATTERN: ABNORMAL
ANA SER QL HEP2 SUBST: POSITIVE
ANA TITR SER IF: ABNORMAL {TITER}
CENTROMERE B AB SER-ACNC: <0.2 AI
CHROMATIN AB SERPL-ACNC: <0.2 AI
DSDNA AB SER-ACNC: 1 IU/ML
ENA JO1 AB SER QL IA: <0.2 AI
ENA RNP AB SER IA-ACNC: <0.2 AI
ENA SCL70 AB SER QL IA: <0.2 AI
ENA SM AB SER IA-ACNC: <0.2 AI
ENA SM+RNP AB SER QL IA: <0.2 AI
ENA SS-A AB SER IA-ACNC: <0.2 AI
ENA SS-B AB SER IA-ACNC: <0.2 AI
RIBOSOMAL P AB SER-ACNC: <0.2 AI

## 2024-08-06 ENCOUNTER — APPOINTMENT (OUTPATIENT)
Dept: PRIMARY CARE | Facility: CLINIC | Age: 64
End: 2024-08-06
Payer: COMMERCIAL

## 2024-08-06 VITALS
SYSTOLIC BLOOD PRESSURE: 146 MMHG | BODY MASS INDEX: 30.91 KG/M2 | HEIGHT: 62 IN | DIASTOLIC BLOOD PRESSURE: 84 MMHG | WEIGHT: 168 LBS | HEART RATE: 74 BPM

## 2024-08-06 DIAGNOSIS — E66.9 OBESITY (BMI 30-39.9): ICD-10-CM

## 2024-08-06 DIAGNOSIS — M79.7 FIBROMYALGIA: ICD-10-CM

## 2024-08-06 DIAGNOSIS — E03.9 ACQUIRED HYPOTHYROIDISM: ICD-10-CM

## 2024-08-06 DIAGNOSIS — F41.9 ANXIETY: ICD-10-CM

## 2024-08-06 DIAGNOSIS — Z12.31 SCREENING MAMMOGRAM FOR BREAST CANCER: ICD-10-CM

## 2024-08-06 DIAGNOSIS — Z12.11 COLON CANCER SCREENING: ICD-10-CM

## 2024-08-06 DIAGNOSIS — M25.50 ARTHRALGIA, UNSPECIFIED JOINT: Primary | ICD-10-CM

## 2024-08-06 DIAGNOSIS — G95.9 CERVICAL MYELOPATHY (MULTI): ICD-10-CM

## 2024-08-06 DIAGNOSIS — I10 BENIGN ESSENTIAL HYPERTENSION: ICD-10-CM

## 2024-08-06 PROCEDURE — 3077F SYST BP >= 140 MM HG: CPT | Performed by: INTERNAL MEDICINE

## 2024-08-06 PROCEDURE — RXMED WILLOW AMBULATORY MEDICATION CHARGE

## 2024-08-06 PROCEDURE — 3008F BODY MASS INDEX DOCD: CPT | Performed by: INTERNAL MEDICINE

## 2024-08-06 PROCEDURE — 3079F DIAST BP 80-89 MM HG: CPT | Performed by: INTERNAL MEDICINE

## 2024-08-06 PROCEDURE — 1036F TOBACCO NON-USER: CPT | Performed by: INTERNAL MEDICINE

## 2024-08-06 PROCEDURE — 99214 OFFICE O/P EST MOD 30 MIN: CPT | Performed by: INTERNAL MEDICINE

## 2024-08-06 RX ORDER — LOSARTAN POTASSIUM 50 MG/1
50 TABLET ORAL DAILY
Qty: 90 TABLET | Refills: 3 | Status: SHIPPED | OUTPATIENT
Start: 2024-08-06

## 2024-08-06 RX ORDER — LEVOTHYROXINE SODIUM 88 UG/1
88 TABLET ORAL DAILY
Qty: 90 TABLET | Refills: 3 | Status: SHIPPED | OUTPATIENT
Start: 2024-08-06

## 2024-08-06 RX ORDER — BUPROPION HYDROCHLORIDE 150 MG/1
150 TABLET ORAL EVERY MORNING
Qty: 30 TABLET | Refills: 5 | Status: SHIPPED | OUTPATIENT
Start: 2024-08-06 | End: 2025-02-02

## 2024-08-06 RX ORDER — FLUOXETINE HYDROCHLORIDE 40 MG/1
40 CAPSULE ORAL DAILY
Qty: 90 CAPSULE | Refills: 3 | Status: SHIPPED | OUTPATIENT
Start: 2024-08-06 | End: 2025-08-01

## 2024-08-06 RX ORDER — GABAPENTIN 300 MG/1
300 CAPSULE ORAL 3 TIMES DAILY
Qty: 90 CAPSULE | Refills: 11 | Status: SHIPPED | OUTPATIENT
Start: 2024-08-06

## 2024-08-06 ASSESSMENT — ENCOUNTER SYMPTOMS
BACK PAIN: 1
ACTIVITY CHANGE: 0
SHORTNESS OF BREATH: 0
CHILLS: 0
DIARRHEA: 1
FATIGUE: 0
NAUSEA: 0
COUGH: 0
APPETITE CHANGE: 0
SORE THROAT: 0
NECK PAIN: 1
VOMITING: 0
ARTHRALGIAS: 1
WHEEZING: 0
SINUS PRESSURE: 0
WEAKNESS: 0
SINUS PAIN: 0
ABDOMINAL DISTENTION: 0
NUMBNESS: 0

## 2024-08-07 DIAGNOSIS — Z12.11 COLON CANCER SCREENING: ICD-10-CM

## 2024-08-09 ENCOUNTER — PHARMACY VISIT (OUTPATIENT)
Dept: PHARMACY | Facility: CLINIC | Age: 64
End: 2024-08-09
Payer: MEDICAID

## 2024-08-15 ENCOUNTER — HOSPITAL ENCOUNTER (OUTPATIENT)
Dept: RADIOLOGY | Facility: CLINIC | Age: 64
Discharge: HOME | End: 2024-08-15
Payer: COMMERCIAL

## 2024-08-15 VITALS — BODY MASS INDEX: 30.91 KG/M2 | HEIGHT: 62 IN | WEIGHT: 168 LBS

## 2024-08-15 DIAGNOSIS — Z12.31 SCREENING MAMMOGRAM FOR BREAST CANCER: ICD-10-CM

## 2024-08-15 PROCEDURE — 77063 BREAST TOMOSYNTHESIS BI: CPT | Performed by: RADIOLOGY

## 2024-08-15 PROCEDURE — 77067 SCR MAMMO BI INCL CAD: CPT

## 2024-08-15 PROCEDURE — 77067 SCR MAMMO BI INCL CAD: CPT | Performed by: RADIOLOGY

## 2024-09-06 PROCEDURE — RXMED WILLOW AMBULATORY MEDICATION CHARGE

## 2024-09-09 DIAGNOSIS — E78.00 HYPERCHOLESTEROLEMIA: Primary | ICD-10-CM

## 2024-09-09 PROCEDURE — RXMED WILLOW AMBULATORY MEDICATION CHARGE

## 2024-09-09 RX ORDER — ATORVASTATIN CALCIUM 80 MG/1
80 TABLET, FILM COATED ORAL NIGHTLY
Qty: 90 TABLET | Refills: 3 | Status: SHIPPED | OUTPATIENT
Start: 2024-09-09 | End: 2025-09-09

## 2024-09-10 ENCOUNTER — HOSPITAL ENCOUNTER (OUTPATIENT)
Dept: GASTROENTEROLOGY | Facility: CLINIC | Age: 64
Setting detail: OUTPATIENT SURGERY
Discharge: HOME | End: 2024-09-10
Payer: COMMERCIAL

## 2024-09-10 VITALS
BODY MASS INDEX: 29.35 KG/M2 | TEMPERATURE: 97.5 F | RESPIRATION RATE: 18 BRPM | OXYGEN SATURATION: 95 % | HEART RATE: 60 BPM | SYSTOLIC BLOOD PRESSURE: 102 MMHG | WEIGHT: 160.5 LBS | DIASTOLIC BLOOD PRESSURE: 68 MMHG

## 2024-09-10 DIAGNOSIS — Z12.11 COLON CANCER SCREENING: ICD-10-CM

## 2024-09-10 PROCEDURE — 2500000004 HC RX 250 GENERAL PHARMACY W/ HCPCS (ALT 636 FOR OP/ED): Performed by: INTERNAL MEDICINE

## 2024-09-10 PROCEDURE — 7100000009 HC PHASE TWO TIME - INITIAL BASE CHARGE

## 2024-09-10 PROCEDURE — 99152 MOD SED SAME PHYS/QHP 5/>YRS: CPT | Performed by: INTERNAL MEDICINE

## 2024-09-10 PROCEDURE — 7100000010 HC PHASE TWO TIME - EACH INCREMENTAL 1 MINUTE

## 2024-09-10 PROCEDURE — 3700000012 HC SEDATION LEVEL 5+ TIME - INITIAL 15 MINUTES 5/> YEARS

## 2024-09-10 PROCEDURE — 45378 DIAGNOSTIC COLONOSCOPY: CPT | Performed by: INTERNAL MEDICINE

## 2024-09-10 RX ORDER — MIDAZOLAM HYDROCHLORIDE 5 MG/ML
INJECTION, SOLUTION INTRAMUSCULAR; INTRAVENOUS AS NEEDED
Status: COMPLETED | OUTPATIENT
Start: 2024-09-10 | End: 2024-09-10

## 2024-09-10 RX ORDER — SODIUM CHLORIDE, SODIUM LACTATE, POTASSIUM CHLORIDE, CALCIUM CHLORIDE 600; 310; 30; 20 MG/100ML; MG/100ML; MG/100ML; MG/100ML
20 INJECTION, SOLUTION INTRAVENOUS CONTINUOUS
Status: DISCONTINUED | OUTPATIENT
Start: 2024-09-10 | End: 2024-09-11 | Stop reason: HOSPADM

## 2024-09-10 RX ORDER — MEPERIDINE HYDROCHLORIDE 25 MG/ML
INJECTION INTRAMUSCULAR; INTRAVENOUS; SUBCUTANEOUS AS NEEDED
Status: COMPLETED | OUTPATIENT
Start: 2024-09-10 | End: 2024-09-10

## 2024-09-10 ASSESSMENT — PAIN SCALES - GENERAL
PAINLEVEL_OUTOF10: 0 - NO PAIN
PAINLEVEL_OUTOF10: 2
PAINLEVEL_OUTOF10: 0 - NO PAIN
PAINLEVEL_OUTOF10: 2

## 2024-09-10 ASSESSMENT — PAIN - FUNCTIONAL ASSESSMENT
PAIN_FUNCTIONAL_ASSESSMENT: 0-10

## 2024-09-10 NOTE — DISCHARGE INSTRUCTIONS
Patient Instructions after a Colonoscopy      The anesthetics, sedatives or narcotics which were given to you today will be acting in your body for the next 24 hours, so you might feel a little sleepy or groggy.  This feeling should slowly wear off. Carefully read and follow the instructions.     You received sedation today:  - Do not drive or operate any machinery or power tools of any kind.   - No alcoholic beverages today, not even beer or wine.  - Do not make any important decisions or sign any legal documents.  - No over the counter medications that contain alcohol or that may cause drowsiness.  - Do not make any important decisions or sign any legal documents.  - Make sure you have someone with you for first 24 hours.    While it is common to experience mild to moderate abdominal distention, gas, or belching after your procedure, if any of these symptoms occur following discharge from the GI Lab or within one week of having your procedure, call the [facility] to be advised whether a visit to your nearest Urgent Care or Emergency Department is indicated.  Take this paper with you if you go.     - If you develop an allergic reaction to the medications that were given during your procedure such as difficulty breathing, rash, hives, severe nausea, vomiting or lightheadedness.  - If you experience chest pain, shortness of breath, severe abdominal pain, fevers and chills.  -If you develop signs and symptoms of bleeding such as blood in your spit, if your stools turn black, tarry, or bloody  - If you have not urinated within 8 hours following your procedure.  - If your IV site becomes painful, red, inflamed, or looks infected.    If you received a biopsy/polypectomy/sphincterotomy the following instructions apply below:    __ Do not use Aspirin containing products, non-steroidal medications or anti-coagulants for one week following your procedure. (Examples of these types of medications are: Advil,  Arthrotec, Aleve, Coumadin, Ecotrin, Heparin, Ibuprofen, Indocin, Motrin, Naprosyn, Nuprin, Plavix, Vioxx, and Voltarin, or their generic forms.  This list is not all-inclusive.  Check with your physician or pharmacist before resuming medications.)   __ Eat a soft diet today.  Avoid foods that are poorly digested for the next 24 hours.  These foods would include: nuts, beans, lettuce, red meats, and fried foods. Start with liquids and advance your diet as tolerated, gradually work up to eating solids.   __ Do not have a Barium Study or Enema for one week.    Your physician recommends the additional following instructions:    -You have a contact number available for emergencies. The signs and symptoms of potential delayed complications were discussed with you. You may return to normal activities tomorrow.  -Resume your previous diet.  -Continue your present medications.   -We are waiting for your pathology results.  -Your physician has recommended a repeat colonoscopy (date to be determined after pending pathology results are reviewed) for surveillance based on pathology results.  -The findings and recommendations have been discussed with you.  -The findings and recommendations were discussed with your family.  - Please see Medication Reconciliation Form for new medication/medications prescribed.       If you experience any problems or have any questions following discharge from the GI Lab, please call:        Nurse Signature                                                                        Date___________________                                                                            Patient/Responsible Party Signature                                        Date___________________

## 2024-09-10 NOTE — H&P
History Of Present Illness  Tueskelley Mcguire is a 64 y.o. female presenting with colon cancer screening.     Past Medical History  Past Medical History:   Diagnosis Date    Anxiety     Depression     Disease of thyroid gland     Hyperlipidemia     Hypertension     Personal history of other diseases of the musculoskeletal system and connective tissue 04/28/2022    History of joint pain    Sleep apnea      Surgical History  Past Surgical History:   Procedure Laterality Date    CARDIAC CATHETERIZATION      CERVICAL SPINE SURGERY      OTHER SURGICAL HISTORY  11/27/2019    Appendectomy laparoscopic    OTHER SURGICAL HISTORY  11/27/2019    Dilation and curettage    OTHER SURGICAL HISTORY  11/27/2019    Tonsillectomy    OTHER SURGICAL HISTORY  11/27/2019    Colonoscopy    OTHER SURGICAL HISTORY  11/27/2019    Loop electrosurgical excision procedure    OTHER SURGICAL HISTORY  11/27/2019    Tubal ligation    OTHER SURGICAL HISTORY  12/01/2019    Cholecystectomy laparoscopic    OTHER SURGICAL HISTORY  03/10/2020    Carpal tunnel surgery     Social History  She reports that she has quit smoking. Her smoking use included cigarettes. She has been exposed to tobacco smoke. She has never used smokeless tobacco. She reports current alcohol use. She reports that she does not use drugs.    Family History  Family History   Problem Relation Name Age of Onset    Other (Bilateral carotid artery stenosis) Mother      Colonic polyp Mother      Thyroid disease Mother      Cervical cancer Mother          Primary    Other (cva) Mother      Osteoporosis Mother      Emphysema Father      Stroke Sister      Colonic polyp Sister      Diabetes Sister      Hypertension Sister      Heart attack Sister      Heart attack Sister      Other (Cardiac disorder) Half-Brother      Other (cardiac disorder) Other grandmother     Lung cancer Other grandmother     Throat cancer Other grandfather         Allergies  Allergies   Allergen Reactions    Codeine  Itching    Etodolac Nausea Only    Naproxen Other     GI problems    Sulfamethoxazole Unknown     Review of Systems  Pre-sedation Evaluation:  ASA Classification - ASA 2 - Patient with mild systemic disease with no functional limitations  Mallampati Score - II (hard and soft palate, upper portion of tonsils and uvula visible)    Physical Exam  Vitals and nursing note reviewed.   Constitutional:       Appearance: Normal appearance.   HENT:      Head: Normocephalic.      Mouth/Throat:      Mouth: Mucous membranes are moist.      Pharynx: Oropharynx is clear.   Eyes:      Conjunctiva/sclera: Conjunctivae normal.      Pupils: Pupils are equal, round, and reactive to light.   Cardiovascular:      Rate and Rhythm: Normal rate and regular rhythm.      Heart sounds: Normal heart sounds.   Pulmonary:      Effort: Pulmonary effort is normal.      Breath sounds: Normal breath sounds.   Abdominal:      General: Abdomen is flat. Bowel sounds are normal.      Palpations: Abdomen is soft.   Musculoskeletal:      Cervical back: Normal range of motion and neck supple.   Skin:     General: Skin is warm and dry.   Neurological:      General: No focal deficit present.      Mental Status: She is alert and oriented to person, place, and time.   Psychiatric:         Behavior: Behavior normal.          Last Recorded Vitals  There were no vitals taken for this visit.     Assessment/Plan   Problem List Items Addressed This Visit    None  Visit Diagnoses       Colon cancer screening        Relevant Orders    Referral to Gastroenterology    Colonoscopy Screening; Average Risk Patient               PTA/Current Medications:  (Not in a hospital admission)    Current Outpatient Medications   Medication Sig Dispense Refill    aspirin 81 mg EC tablet Take 1 tablet (81 mg) by mouth once daily.      atorvastatin (Lipitor) 80 mg tablet TAKE 1 TABLET BY MOUTH EVERY DAY AT BEDTIME 90 tablet 3    buPROPion XL (Wellbutrin XL) 150 mg 24 hr tablet Take 1  tablet (150 mg) by mouth once daily in the morning. Do not crush, chew, or split. 30 tablet 5    colchicine 0.6 mg tablet Take 1 tablet (0.6 mg) by mouth 2 times a day as needed for muscle/joint pain (gout flare). 30 tablet 5    FLUoxetine (PROzac) 40 mg capsule Take 1 capsule (40 mg) by mouth once daily. 90 capsule 3    gabapentin (Neurontin) 300 mg capsule Take 1 capsule (300 mg) by mouth 3 times a day. 90 capsule 11    ibuprofen 600 mg tablet Take 1 tablet (600 mg) by mouth every 6 hours if needed for moderate pain (4 - 6) for up to 30 doses. 30 tablet 0    levothyroxine (Synthroid, Levoxyl) 88 mcg tablet Take 1 tablet (88 mcg) by mouth once daily. 90 tablet 3    losartan (Cozaar) 50 mg tablet Take 1 tablet (50 mg) by mouth once daily. May take second tab if bp > 140/90 90 tablet 3    tiZANidine (Zanaflex) 4 mg tablet Take 1 tablet (4 mg) by mouth every 6 hours if needed for muscle spasms. 30 tablet 3    umeclidinium-vilanteroL (Anoro Ellipta) 62.5-25 mcg/actuation blister with device Inhale 1 puff once daily.       Current Facility-Administered Medications   Medication Dose Route Frequency Provider Last Rate Last Admin    lactated Ringer's infusion  20 mL/hr intravenous Continuous DO Josiah Granados DO

## 2024-09-11 ENCOUNTER — PHARMACY VISIT (OUTPATIENT)
Dept: PHARMACY | Facility: CLINIC | Age: 64
End: 2024-09-11
Payer: MEDICAID

## 2024-09-24 ENCOUNTER — APPOINTMENT (OUTPATIENT)
Dept: PRIMARY CARE | Facility: CLINIC | Age: 64
End: 2024-09-24
Payer: COMMERCIAL

## 2024-09-24 VITALS
BODY MASS INDEX: 30.36 KG/M2 | WEIGHT: 165 LBS | SYSTOLIC BLOOD PRESSURE: 127 MMHG | HEART RATE: 67 BPM | HEIGHT: 62 IN | DIASTOLIC BLOOD PRESSURE: 90 MMHG

## 2024-09-24 DIAGNOSIS — H60.332 ACUTE SWIMMER'S EAR OF LEFT SIDE: Primary | ICD-10-CM

## 2024-09-24 DIAGNOSIS — R31.9 HEMATURIA, UNSPECIFIED TYPE: ICD-10-CM

## 2024-09-24 LAB
POC APPEARANCE, URINE: CLEAR
POC BILIRUBIN, URINE: NEGATIVE
POC BLOOD, URINE: ABNORMAL
POC COLOR, URINE: YELLOW
POC GLUCOSE, URINE: NEGATIVE MG/DL
POC KETONES, URINE: NEGATIVE MG/DL
POC LEUKOCYTES, URINE: NEGATIVE
POC NITRITE,URINE: NEGATIVE
POC PH, URINE: 6 PH
POC PROTEIN, URINE: NEGATIVE MG/DL
POC SPECIFIC GRAVITY, URINE: >=1.03
POC UROBILINOGEN, URINE: 0.2 EU/DL

## 2024-09-24 PROCEDURE — 3008F BODY MASS INDEX DOCD: CPT

## 2024-09-24 PROCEDURE — 81003 URINALYSIS AUTO W/O SCOPE: CPT

## 2024-09-24 PROCEDURE — 1036F TOBACCO NON-USER: CPT

## 2024-09-24 PROCEDURE — 3074F SYST BP LT 130 MM HG: CPT

## 2024-09-24 PROCEDURE — 3080F DIAST BP >= 90 MM HG: CPT

## 2024-09-24 PROCEDURE — 99214 OFFICE O/P EST MOD 30 MIN: CPT

## 2024-09-24 PROCEDURE — RXMED WILLOW AMBULATORY MEDICATION CHARGE

## 2024-09-24 RX ORDER — FLUTICASONE PROPIONATE 50 MCG
1 SPRAY, SUSPENSION (ML) NASAL DAILY
Qty: 16 G | Refills: 11 | Status: SHIPPED | OUTPATIENT
Start: 2024-09-24 | End: 2025-09-24

## 2024-09-24 RX ORDER — CIPROFLOXACIN AND DEXAMETHASONE 3; 1 MG/ML; MG/ML
4 SUSPENSION/ DROPS AURICULAR (OTIC) 2 TIMES DAILY
Qty: 7.5 ML | Refills: 0 | Status: SHIPPED | OUTPATIENT
Start: 2024-09-24 | End: 2024-10-15

## 2024-09-24 ASSESSMENT — ENCOUNTER SYMPTOMS
SINUS PRESSURE: 0
DYSURIA: 0
DIARRHEA: 0
SINUS PAIN: 0
PALPITATIONS: 0
FATIGUE: 0
NAUSEA: 0
LIGHT-HEADEDNESS: 0
HEADACHES: 0
CONSTIPATION: 0
COUGH: 0
CHILLS: 0
VOMITING: 0
ABDOMINAL DISTENTION: 0
FEVER: 0
SHORTNESS OF BREATH: 0
ABDOMINAL PAIN: 1

## 2024-09-24 ASSESSMENT — PATIENT HEALTH QUESTIONNAIRE - PHQ9
SUM OF ALL RESPONSES TO PHQ9 QUESTIONS 1 AND 2: 0
1. LITTLE INTEREST OR PLEASURE IN DOING THINGS: NOT AT ALL
2. FEELING DOWN, DEPRESSED OR HOPELESS: NOT AT ALL

## 2024-09-24 NOTE — PROGRESS NOTES
"Subjective   Patient ID: Tueskelley Mcguire is a 64 y.o. female who presents for Earache and Abdominal Pain.    HPI   Here today for left ear pain, and LLQ abdominal pain. Also reports she has history of blood in her urine on a UA but it is not visible when she pees. Colonoscopy was negative showed diverticulitis.  Was seeing Dr Senior for hematuria, had cystoscopy done in the past. Does not want cystoscopy again, will order US bladder and pelvis.   Left ear pain for a couple months, took antibiotics from urgent care and reports only mild improvement.     Review of Systems   Constitutional:  Negative for chills, fatigue and fever.   HENT:  Positive for ear pain. Negative for ear discharge, sinus pressure and sinus pain.    Respiratory:  Negative for cough and shortness of breath.    Cardiovascular:  Negative for chest pain and palpitations.   Gastrointestinal:  Positive for abdominal pain. Negative for abdominal distention, constipation, diarrhea, nausea and vomiting.   Genitourinary:  Negative for dysuria.   Neurological:  Negative for light-headedness and headaches.       Objective   /90 (Patient Position: Sitting)   Pulse 67   Ht 1.575 m (5' 2\")   Wt 74.8 kg (165 lb)   BMI 30.18 kg/m²     Physical Exam  HENT:      Left Ear: Swelling present. A middle ear effusion is present.   Cardiovascular:      Rate and Rhythm: Normal rate and regular rhythm.      Heart sounds: Normal heart sounds.   Pulmonary:      Breath sounds: Normal breath sounds.   Abdominal:      General: Bowel sounds are normal.   Skin:     Capillary Refill: Capillary refill takes less than 2 seconds.   Neurological:      Mental Status: She is alert and oriented to person, place, and time.         Assessment/Plan   Problem List Items Addressed This Visit             ICD-10-CM    Hematuria R31.9    Relevant Orders    US bladder    POCT UA Automated manually resulted (Completed)     Other Visit Diagnoses         Codes    Acute swimmer's ear of " left side    -  Primary H60.332    Relevant Medications    ciprofloxacin-dexamethasone (CiproDEX) otic suspension    fluticasone (Flonase) 50 mcg/actuation nasal spray             Swimmer's ear   -Cipro drops   - suggested Flonase nasal spray   -If no improvement return back to the office     LLQ abdominal pain  -UA in office   -US bladder ordered   -Recommend returning to Dr. Senior for an evaluation     OAB  -Returns 5-6 episodes of nocturia a night  -Recommend returning to Dr. Senior but she is hesitant at the time, recommend trying oxybutynin after US

## 2024-09-26 ENCOUNTER — PHARMACY VISIT (OUTPATIENT)
Dept: PHARMACY | Facility: CLINIC | Age: 64
End: 2024-09-26
Payer: MEDICAID

## 2024-10-01 ENCOUNTER — HOSPITAL ENCOUNTER (OUTPATIENT)
Dept: RADIOLOGY | Facility: HOSPITAL | Age: 64
End: 2024-10-01
Payer: COMMERCIAL

## 2024-10-08 ENCOUNTER — HOSPITAL ENCOUNTER (OUTPATIENT)
Dept: RADIOLOGY | Facility: HOSPITAL | Age: 64
Discharge: HOME | End: 2024-10-08
Payer: COMMERCIAL

## 2024-10-08 DIAGNOSIS — R31.9 HEMATURIA, UNSPECIFIED TYPE: ICD-10-CM

## 2024-10-08 PROCEDURE — 76857 US EXAM PELVIC LIMITED: CPT

## 2024-10-08 PROCEDURE — 76857 US EXAM PELVIC LIMITED: CPT | Performed by: RADIOLOGY

## 2024-10-10 PROCEDURE — RXMED WILLOW AMBULATORY MEDICATION CHARGE

## 2024-10-11 ENCOUNTER — PHARMACY VISIT (OUTPATIENT)
Dept: PHARMACY | Facility: CLINIC | Age: 64
End: 2024-10-11
Payer: MEDICAID

## 2024-11-04 ENCOUNTER — OFFICE VISIT (OUTPATIENT)
Dept: URGENT CARE | Facility: CLINIC | Age: 64
End: 2024-11-04
Payer: COMMERCIAL

## 2024-11-04 VITALS
RESPIRATION RATE: 16 BRPM | WEIGHT: 164 LBS | HEART RATE: 63 BPM | TEMPERATURE: 97.1 F | SYSTOLIC BLOOD PRESSURE: 143 MMHG | DIASTOLIC BLOOD PRESSURE: 89 MMHG | OXYGEN SATURATION: 95 % | BODY MASS INDEX: 30.18 KG/M2 | HEIGHT: 62 IN

## 2024-11-04 DIAGNOSIS — H69.92 DYSFUNCTION OF LEFT EUSTACHIAN TUBE: Primary | ICD-10-CM

## 2024-11-04 PROCEDURE — RXMED WILLOW AMBULATORY MEDICATION CHARGE

## 2024-11-04 PROCEDURE — 99213 OFFICE O/P EST LOW 20 MIN: CPT | Performed by: NURSE PRACTITIONER

## 2024-11-04 RX ORDER — PREDNISONE 20 MG/1
20 TABLET ORAL DAILY
Qty: 5 TABLET | Refills: 0 | Status: SHIPPED | OUTPATIENT
Start: 2024-11-04 | End: 2024-11-10

## 2024-11-04 RX ORDER — CETIRIZINE HYDROCHLORIDE 10 MG/1
10 TABLET ORAL DAILY
Qty: 30 TABLET | Refills: 2 | Status: SHIPPED | OUTPATIENT
Start: 2024-11-04

## 2024-11-04 NOTE — PROGRESS NOTES
64 y.o. female presents for evaluation of left ear pain that has been present for the past few months.  States she has been treated with antibiotics a few weeks ago which did not improve symptoms.  Has not been using OTC antihistamines.  States she does use Flonase occasionally.  No fever, drainage from ear, change in hearing, nasal congestion, cough, sore throat, headache, fatigue or any other associated symptom or complaint.  No other complaints.      Vitals:    11/04/24 1353   BP: 143/89   Pulse: 63   Resp: 16   Temp: 36.2 °C (97.1 °F)   SpO2: 95%       Allergies   Allergen Reactions    Codeine Itching    Etodolac Nausea Only    Naproxen Other     GI problems    Sulfamethoxazole Unknown       Medication Documentation Review Audit       Reviewed by Tori Hernández MA (Medical Assistant) on 11/04/24 at 1352      Medication Order Taking? Sig Documenting Provider Last Dose Status   aspirin 81 mg EC tablet [id number] Yes Take 1 tablet (81 mg) by mouth once daily. Historical Provider, MD  Active   atorvastatin (Lipitor) 80 mg tablet [id number] Yes TAKE 1 TABLET BY MOUTH EVERY DAY AT BEDTIME Manisha Singh DO  Active   buPROPion XL (Wellbutrin XL) 150 mg 24 hr tablet [id number] Yes Take 1 tablet (150 mg) by mouth once daily in the morning. Do not crush, chew, or split. Manisha Singh DO  Active   FLUoxetine (PROzac) 40 mg capsule [id number] Yes Take 1 capsule (40 mg) by mouth once daily. Manisha Singh DO  Active   fluticasone (Flonase) 50 mcg/actuation nasal spray [id number] Yes Administer 1 spray into each nostril once daily. Shake gently. Before first use, prime pump. After use, clean tip and replace cap. Gracie Roman, APRN-CNP  Active   gabapentin (Neurontin) 300 mg capsule [id number] Yes Take 1 capsule (300 mg) by mouth 3 times a day. Manisha Singh DO  Active   levothyroxine (Synthroid, Levoxyl) 88 mcg tablet [id number] Yes Take 1 tablet (88 mcg) by mouth once daily. Manisha Singh DO  Active    losartan (Cozaar) 50 mg tablet [id number] Yes Take 1 tablet (50 mg) by mouth once daily. May take second tab if bp > 140/90 Manisha L Francisco DO  Active   tiZANidine (Zanaflex) 4 mg tablet [id number]  Take 1 tablet (4 mg) by mouth every 6 hours if needed for muscle spasms.   Patient not taking: Reported on 9/10/2024    Manisha L Francisco DO   24 2359   umeclidinium-vilanteroL (Anoro Ellipta) 62.5-25 mcg/actuation blister with device [id number] Yes Inhale 1 puff once daily. Historical Provider, MD  Active                    Past Medical History:   Diagnosis Date    Anxiety     Depression     Disease of thyroid gland     Hyperlipidemia     Hypertension     Personal history of other diseases of the musculoskeletal system and connective tissue 2022    History of joint pain    Sleep apnea        Past Surgical History:   Procedure Laterality Date    CARDIAC CATHETERIZATION      CERVICAL SPINE SURGERY      OTHER SURGICAL HISTORY  2019    Appendectomy laparoscopic    OTHER SURGICAL HISTORY  2019    Dilation and curettage    OTHER SURGICAL HISTORY  2019    Tonsillectomy    OTHER SURGICAL HISTORY  2019    Colonoscopy    OTHER SURGICAL HISTORY  2019    Loop electrosurgical excision procedure    OTHER SURGICAL HISTORY  2019    Tubal ligation    OTHER SURGICAL HISTORY  2019    Cholecystectomy laparoscopic    OTHER SURGICAL HISTORY  03/10/2020    Carpal tunnel surgery       ROS  See HPI    Physical Exam  Vitals and nursing note reviewed.   Constitutional:       Appearance: Normal appearance. She is normal weight.   HENT:      Head: Normocephalic and atraumatic.      Right Ear: Tympanic membrane and ear canal normal.      Left Ear: Tympanic membrane and ear canal normal.      Nose: Nose normal.      Mouth/Throat:      Mouth: Mucous membranes are moist.      Pharynx: Oropharynx is clear.   Eyes:      Extraocular Movements: Extraocular movements intact.       Conjunctiva/sclera: Conjunctivae normal.      Pupils: Pupils are equal, round, and reactive to light.   Cardiovascular:      Rate and Rhythm: Normal rate.   Pulmonary:      Effort: Pulmonary effort is normal.      Breath sounds: Normal breath sounds.   Skin:     General: Skin is warm and dry.   Neurological:      General: No focal deficit present.      Mental Status: She is alert and oriented to person, place, and time.   Psychiatric:         Mood and Affect: Mood normal.         Behavior: Behavior normal.           Assessment/Plan/MDM  Tuesday was seen today for earache.  Diagnoses and all orders for this visit:  Dysfunction of left eustachian tube (Primary)  -     predniSONE (Deltasone) 20 mg tablet; Take 1 tablet (20 mg) by mouth once daily for 5 days.  -     cetirizine (ZyrTEC) 10 mg tablet; Take 1 tablet (10 mg) by mouth once daily.    Patient's clinical presentation is otherwise unremarkable at this time. Patient is discharged with instructions to follow-up with primary care or seek emergency medical attention for worsening symptoms or any new concerns.    I did personally review Tuesday's past medical history, surgical history, social history, as well as family history (when relevant).  In this case, I also oversaw the her drug management by reviewing her medication list, allergy list, as well as the medications that I prescribed during the UC course and/or recommended as an out-patient (including possible OTC medications such as acetaminophen, NSAIDs , etc).    After reviewing the items above, I did look at previous medical documentation, such as recent hospitalizations, office visits, and/or recent consultations with PCP/specialist.                          SDOH:   Another factor that I considered in Tuesday's care was her Social Determinants of Health (SDOH). During this UC encounter, she did not have social determinants of health. Those SDOH influencing Tuesdays care are: none      Paul Mckeon  [facility]  [phone]

## 2024-11-05 ENCOUNTER — PHARMACY VISIT (OUTPATIENT)
Dept: PHARMACY | Facility: CLINIC | Age: 64
End: 2024-11-05
Payer: MEDICAID

## 2024-11-05 PROCEDURE — RXMED WILLOW AMBULATORY MEDICATION CHARGE

## 2024-11-11 ENCOUNTER — APPOINTMENT (OUTPATIENT)
Dept: RADIOLOGY | Facility: HOSPITAL | Age: 64
End: 2024-11-11
Payer: COMMERCIAL

## 2024-11-11 ENCOUNTER — HOSPITAL ENCOUNTER (OUTPATIENT)
Facility: HOSPITAL | Age: 64
Setting detail: OBSERVATION
Discharge: HOME | End: 2024-11-13
Attending: EMERGENCY MEDICINE | Admitting: HOSPITALIST
Payer: COMMERCIAL

## 2024-11-11 DIAGNOSIS — R29.90 STROKE-LIKE SYMPTOMS: Primary | ICD-10-CM

## 2024-11-11 DIAGNOSIS — I65.23 OCCLUSION AND STENOSIS OF BILATERAL CAROTID ARTERIES: ICD-10-CM

## 2024-11-11 DIAGNOSIS — G45.9 TIA (TRANSIENT ISCHEMIC ATTACK): ICD-10-CM

## 2024-11-11 DIAGNOSIS — R51.9 NONINTRACTABLE HEADACHE, UNSPECIFIED CHRONICITY PATTERN, UNSPECIFIED HEADACHE TYPE: ICD-10-CM

## 2024-11-11 DIAGNOSIS — N28.9 RENAL INSUFFICIENCY: ICD-10-CM

## 2024-11-11 DIAGNOSIS — R09.89 OTHER SPECIFIED SYMPTOMS AND SIGNS INVOLVING THE CIRCULATORY AND RESPIRATORY SYSTEMS: ICD-10-CM

## 2024-11-11 LAB
ALBUMIN SERPL BCP-MCNC: 4.3 G/DL (ref 3.4–5)
ALP SERPL-CCNC: 89 U/L (ref 33–136)
ALT SERPL W P-5'-P-CCNC: 19 U/L (ref 7–45)
ANION GAP SERPL CALC-SCNC: 12 MMOL/L (ref 10–20)
APTT PPP: 28 SECONDS (ref 27–38)
AST SERPL W P-5'-P-CCNC: 17 U/L (ref 9–39)
BASOPHILS # BLD AUTO: 0.08 X10*3/UL (ref 0–0.1)
BASOPHILS NFR BLD AUTO: 0.5 %
BILIRUB SERPL-MCNC: 0.5 MG/DL (ref 0–1.2)
BUN SERPL-MCNC: 27 MG/DL (ref 6–23)
CALCIUM SERPL-MCNC: 9.4 MG/DL (ref 8.6–10.3)
CARDIAC TROPONIN I PNL SERPL HS: 4 NG/L (ref 0–13)
CHLORIDE SERPL-SCNC: 104 MMOL/L (ref 98–107)
CO2 SERPL-SCNC: 25 MMOL/L (ref 21–32)
CREAT SERPL-MCNC: 1.69 MG/DL (ref 0.5–1.05)
EGFRCR SERPLBLD CKD-EPI 2021: 34 ML/MIN/1.73M*2
EOSINOPHIL # BLD AUTO: 0.21 X10*3/UL (ref 0–0.7)
EOSINOPHIL NFR BLD AUTO: 1.4 %
ERYTHROCYTE [DISTWIDTH] IN BLOOD BY AUTOMATED COUNT: 14.5 % (ref 11.5–14.5)
GLUCOSE BLD MANUAL STRIP-MCNC: 81 MG/DL (ref 74–99)
GLUCOSE SERPL-MCNC: 81 MG/DL (ref 74–99)
HCT VFR BLD AUTO: 39.9 % (ref 36–46)
HGB BLD-MCNC: 13.3 G/DL (ref 12–16)
IMM GRANULOCYTES # BLD AUTO: 0.06 X10*3/UL (ref 0–0.7)
IMM GRANULOCYTES NFR BLD AUTO: 0.4 % (ref 0–0.9)
INR PPP: 0.9 (ref 0.9–1.1)
LYMPHOCYTES # BLD AUTO: 4 X10*3/UL (ref 1.2–4.8)
LYMPHOCYTES NFR BLD AUTO: 25.9 %
MCH RBC QN AUTO: 29.8 PG (ref 26–34)
MCHC RBC AUTO-ENTMCNC: 33.3 G/DL (ref 32–36)
MCV RBC AUTO: 89 FL (ref 80–100)
MONOCYTES # BLD AUTO: 1.03 X10*3/UL (ref 0.1–1)
MONOCYTES NFR BLD AUTO: 6.7 %
NEUTROPHILS # BLD AUTO: 10.04 X10*3/UL (ref 1.2–7.7)
NEUTROPHILS NFR BLD AUTO: 65.1 %
NRBC BLD-RTO: 0 /100 WBCS (ref 0–0)
PLATELET # BLD AUTO: 240 X10*3/UL (ref 150–450)
POTASSIUM SERPL-SCNC: 3.8 MMOL/L (ref 3.5–5.3)
PROT SERPL-MCNC: 7.1 G/DL (ref 6.4–8.2)
PROTHROMBIN TIME: 10.9 SECONDS (ref 9.8–12.8)
RBC # BLD AUTO: 4.47 X10*6/UL (ref 4–5.2)
SODIUM SERPL-SCNC: 137 MMOL/L (ref 136–145)
WBC # BLD AUTO: 15.4 X10*3/UL (ref 4.4–11.3)

## 2024-11-11 PROCEDURE — G0378 HOSPITAL OBSERVATION PER HR: HCPCS

## 2024-11-11 PROCEDURE — RXMED WILLOW AMBULATORY MEDICATION CHARGE

## 2024-11-11 PROCEDURE — 70450 CT HEAD/BRAIN W/O DYE: CPT | Performed by: RADIOLOGY

## 2024-11-11 PROCEDURE — 82947 ASSAY GLUCOSE BLOOD QUANT: CPT

## 2024-11-11 PROCEDURE — 94760 N-INVAS EAR/PLS OXIMETRY 1: CPT

## 2024-11-11 PROCEDURE — 96365 THER/PROPH/DIAG IV INF INIT: CPT

## 2024-11-11 PROCEDURE — 36415 COLL VENOUS BLD VENIPUNCTURE: CPT | Performed by: EMERGENCY MEDICINE

## 2024-11-11 PROCEDURE — 84484 ASSAY OF TROPONIN QUANT: CPT | Performed by: EMERGENCY MEDICINE

## 2024-11-11 PROCEDURE — 2500000001 HC RX 250 WO HCPCS SELF ADMINISTERED DRUGS (ALT 637 FOR MEDICARE OP): Performed by: EMERGENCY MEDICINE

## 2024-11-11 PROCEDURE — 85025 COMPLETE CBC W/AUTO DIFF WBC: CPT | Performed by: EMERGENCY MEDICINE

## 2024-11-11 PROCEDURE — 2500000004 HC RX 250 GENERAL PHARMACY W/ HCPCS (ALT 636 FOR OP/ED): Performed by: EMERGENCY MEDICINE

## 2024-11-11 PROCEDURE — 80053 COMPREHEN METABOLIC PANEL: CPT | Performed by: EMERGENCY MEDICINE

## 2024-11-11 PROCEDURE — 70450 CT HEAD/BRAIN W/O DYE: CPT

## 2024-11-11 PROCEDURE — 99285 EMERGENCY DEPT VISIT HI MDM: CPT | Mod: 25

## 2024-11-11 PROCEDURE — 85610 PROTHROMBIN TIME: CPT | Performed by: EMERGENCY MEDICINE

## 2024-11-11 PROCEDURE — 85730 THROMBOPLASTIN TIME PARTIAL: CPT | Performed by: EMERGENCY MEDICINE

## 2024-11-11 PROCEDURE — 96361 HYDRATE IV INFUSION ADD-ON: CPT

## 2024-11-11 PROCEDURE — 99223 1ST HOSP IP/OBS HIGH 75: CPT | Performed by: INTERNAL MEDICINE

## 2024-11-11 PROCEDURE — 93005 ELECTROCARDIOGRAM TRACING: CPT

## 2024-11-11 RX ORDER — NAPROXEN SODIUM 220 MG/1
324 TABLET, FILM COATED ORAL ONCE
Status: COMPLETED | OUTPATIENT
Start: 2024-11-11 | End: 2024-11-11

## 2024-11-11 RX ORDER — ACETAMINOPHEN 325 MG/1
650 TABLET ORAL ONCE
Status: COMPLETED | OUTPATIENT
Start: 2024-11-11 | End: 2024-11-11

## 2024-11-11 SDOH — SOCIAL STABILITY: SOCIAL INSECURITY: HAS ANYONE EVER THREATENED TO HURT YOUR FAMILY OR YOUR PETS?: NO

## 2024-11-11 SDOH — ECONOMIC STABILITY: HOUSING INSECURITY: IN THE PAST 12 MONTHS, HOW MANY TIMES HAVE YOU MOVED WHERE YOU WERE LIVING?: 1

## 2024-11-11 SDOH — SOCIAL STABILITY: SOCIAL INSECURITY: ARE YOU OR HAVE YOU BEEN THREATENED OR ABUSED PHYSICALLY, EMOTIONALLY, OR SEXUALLY BY ANYONE?: NO

## 2024-11-11 SDOH — SOCIAL STABILITY: SOCIAL INSECURITY: DO YOU FEEL UNSAFE GOING BACK TO THE PLACE WHERE YOU ARE LIVING?: NO

## 2024-11-11 SDOH — SOCIAL STABILITY: SOCIAL INSECURITY: DO YOU FEEL ANYONE HAS EXPLOITED OR TAKEN ADVANTAGE OF YOU FINANCIALLY OR OF YOUR PERSONAL PROPERTY?: NO

## 2024-11-11 SDOH — SOCIAL STABILITY: SOCIAL INSECURITY: DOES ANYONE TRY TO KEEP YOU FROM HAVING/CONTACTING OTHER FRIENDS OR DOING THINGS OUTSIDE YOUR HOME?: NO

## 2024-11-11 SDOH — SOCIAL STABILITY: SOCIAL INSECURITY: WITHIN THE LAST YEAR, HAVE YOU BEEN AFRAID OF YOUR PARTNER OR EX-PARTNER?: NO

## 2024-11-11 SDOH — ECONOMIC STABILITY: HOUSING INSECURITY: IN THE LAST 12 MONTHS, WAS THERE A TIME WHEN YOU WERE NOT ABLE TO PAY THE MORTGAGE OR RENT ON TIME?: PATIENT DECLINED

## 2024-11-11 SDOH — SOCIAL STABILITY: SOCIAL INSECURITY: ARE THERE ANY APPARENT SIGNS OF INJURIES/BEHAVIORS THAT COULD BE RELATED TO ABUSE/NEGLECT?: NO

## 2024-11-11 SDOH — SOCIAL STABILITY: SOCIAL INSECURITY: ABUSE: ADULT

## 2024-11-11 SDOH — ECONOMIC STABILITY: INCOME INSECURITY
IN THE PAST 12 MONTHS HAS THE ELECTRIC, GAS, OIL, OR WATER COMPANY THREATENED TO SHUT OFF SERVICES IN YOUR HOME?: PATIENT DECLINED

## 2024-11-11 SDOH — SOCIAL STABILITY: SOCIAL INSECURITY
WITHIN THE LAST YEAR, HAVE YOU BEEN KICKED, HIT, SLAPPED, OR OTHERWISE PHYSICALLY HURT BY YOUR PARTNER OR EX-PARTNER?: NO

## 2024-11-11 SDOH — SOCIAL STABILITY: SOCIAL INSECURITY
WITHIN THE LAST YEAR, HAVE YOU BEEN RAPED OR FORCED TO HAVE ANY KIND OF SEXUAL ACTIVITY BY YOUR PARTNER OR EX-PARTNER?: NO

## 2024-11-11 SDOH — SOCIAL STABILITY: SOCIAL INSECURITY: WITHIN THE LAST YEAR, HAVE YOU BEEN HUMILIATED OR EMOTIONALLY ABUSED IN OTHER WAYS BY YOUR PARTNER OR EX-PARTNER?: NO

## 2024-11-11 SDOH — SOCIAL STABILITY: SOCIAL INSECURITY: HAVE YOU HAD THOUGHTS OF HARMING ANYONE ELSE?: NO

## 2024-11-11 SDOH — ECONOMIC STABILITY: FOOD INSECURITY
WITHIN THE PAST 12 MONTHS, YOU WORRIED THAT YOUR FOOD WOULD RUN OUT BEFORE YOU GOT THE MONEY TO BUY MORE.: PATIENT DECLINED

## 2024-11-11 SDOH — ECONOMIC STABILITY: FOOD INSECURITY: HOW HARD IS IT FOR YOU TO PAY FOR THE VERY BASICS LIKE FOOD, HOUSING, MEDICAL CARE, AND HEATING?: PATIENT DECLINED

## 2024-11-11 SDOH — ECONOMIC STABILITY: FOOD INSECURITY: WITHIN THE PAST 12 MONTHS, THE FOOD YOU BOUGHT JUST DIDN'T LAST AND YOU DIDN'T HAVE MONEY TO GET MORE.: PATIENT DECLINED

## 2024-11-11 SDOH — ECONOMIC STABILITY: TRANSPORTATION INSECURITY
IN THE PAST 12 MONTHS, HAS LACK OF TRANSPORTATION KEPT YOU FROM MEDICAL APPOINTMENTS OR FROM GETTING MEDICATIONS?: PATIENT DECLINED

## 2024-11-11 SDOH — SOCIAL STABILITY: SOCIAL INSECURITY: WERE YOU ABLE TO COMPLETE ALL THE BEHAVIORAL HEALTH SCREENINGS?: YES

## 2024-11-11 SDOH — SOCIAL STABILITY: SOCIAL INSECURITY: HAVE YOU HAD ANY THOUGHTS OF HARMING ANYONE ELSE?: NO

## 2024-11-11 SDOH — ECONOMIC STABILITY: HOUSING INSECURITY: AT ANY TIME IN THE PAST 12 MONTHS, WERE YOU HOMELESS OR LIVING IN A SHELTER (INCLUDING NOW)?: PATIENT DECLINED

## 2024-11-11 ASSESSMENT — ACTIVITIES OF DAILY LIVING (ADL)
GROOMING: INDEPENDENT
WALKS IN HOME: INDEPENDENT
HEARING - LEFT EAR: FUNCTIONAL
TOILETING: INDEPENDENT
ADEQUATE_TO_COMPLETE_ADL: YES
LACK_OF_TRANSPORTATION: PATIENT DECLINED
DRESSING YOURSELF: INDEPENDENT
LACK_OF_TRANSPORTATION: PATIENT DECLINED
JUDGMENT_ADEQUATE_SAFELY_COMPLETE_DAILY_ACTIVITIES: YES
BATHING: INDEPENDENT
PATIENT'S MEMORY ADEQUATE TO SAFELY COMPLETE DAILY ACTIVITIES?: YES
HEARING - RIGHT EAR: FUNCTIONAL
FEEDING YOURSELF: INDEPENDENT

## 2024-11-11 ASSESSMENT — COGNITIVE AND FUNCTIONAL STATUS - GENERAL
DAILY ACTIVITIY SCORE: 24
MOBILITY SCORE: 24
PATIENT BASELINE BEDBOUND: NO

## 2024-11-11 ASSESSMENT — PAIN SCALES - GENERAL
PAINLEVEL_OUTOF10: 9
PAINLEVEL_OUTOF10: 0 - NO PAIN
PAINLEVEL_OUTOF10: 9

## 2024-11-11 ASSESSMENT — LIFESTYLE VARIABLES
SKIP TO QUESTIONS 9-10: 0
HOW OFTEN DO YOU HAVE 6 OR MORE DRINKS ON ONE OCCASION: PATIENT DECLINED
HOW OFTEN DO YOU HAVE A DRINK CONTAINING ALCOHOL: PATIENT DECLINED
AUDIT-C TOTAL SCORE: -1
AUDIT-C TOTAL SCORE: -1
HOW MANY STANDARD DRINKS CONTAINING ALCOHOL DO YOU HAVE ON A TYPICAL DAY: PATIENT DECLINED

## 2024-11-11 ASSESSMENT — PAIN - FUNCTIONAL ASSESSMENT
PAIN_FUNCTIONAL_ASSESSMENT: 0-10

## 2024-11-11 ASSESSMENT — PATIENT HEALTH QUESTIONNAIRE - PHQ9
SUM OF ALL RESPONSES TO PHQ9 QUESTIONS 1 & 2: 0
1. LITTLE INTEREST OR PLEASURE IN DOING THINGS: NOT AT ALL
2. FEELING DOWN, DEPRESSED OR HOPELESS: NOT AT ALL

## 2024-11-11 ASSESSMENT — VISUAL ACUITY: OU: 1

## 2024-11-11 ASSESSMENT — PAIN DESCRIPTION - LOCATION: LOCATION: HEAD

## 2024-11-11 NOTE — ED PROVIDER NOTES
Possible stroke symptoms.  This 64-year-old white female presents to the ED with her daughter secondary to complaint of headache symptoms and weakness of her right upper extremity began at 4:30 PM this evening.  Patient states that she has a lot of hardware in her neck and thought perhaps her symptoms were due to previous neck surgeries she states that she just does not feel as though she can use her right arm like she normally does.  She is right-hand dominant.  States that she has had more frequent headaches over the past week to 10 days.  She states that her headache today is located in bitemporal area and seem to be associated with her inability to use her right upper extremity normally.  She denies the use of blood thinners.  She denies any previous history of stroke.  She states that she has had headache issues in the past due to uncontrolled hypertension.      History provided by:  Patient and relative   used: No         Physical Exam  Vitals and nursing note reviewed.   Constitutional:       General: She is awake.      Appearance: Normal appearance. She is normal weight.   HENT:      Head: Normocephalic and atraumatic.      Right Ear: Hearing and external ear normal.      Left Ear: Hearing and external ear normal.      Nose: Nose normal. No congestion or rhinorrhea.      Mouth/Throat:      Lips: Pink.      Mouth: Mucous membranes are moist.      Pharynx: Oropharynx is clear. Uvula midline. No oropharyngeal exudate or posterior oropharyngeal erythema.   Eyes:      General: Lids are normal. Vision grossly intact.         Right eye: No discharge.         Left eye: No discharge.      Extraocular Movements: Extraocular movements intact.      Conjunctiva/sclera: Conjunctivae normal.      Pupils: Pupils are equal, round, and reactive to light.   Cardiovascular:      Rate and Rhythm: Normal rate and regular rhythm.      Pulses: Normal pulses.      Heart sounds: Normal heart sounds. No murmur  heard.     No friction rub. No gallop.   Pulmonary:      Effort: Pulmonary effort is normal. No respiratory distress.      Breath sounds: Normal breath sounds. No stridor. No wheezing, rhonchi or rales.   Chest:      Chest wall: No tenderness.   Abdominal:      General: Abdomen is flat. Bowel sounds are normal. There is no distension.      Palpations: Abdomen is soft. There is no mass.      Tenderness: There is no abdominal tenderness. There is no guarding or rebound.      Hernia: No hernia is present.   Musculoskeletal:         General: No swelling, tenderness, deformity or signs of injury. Normal range of motion.      Cervical back: Full passive range of motion without pain, normal range of motion and neck supple.      Right lower leg: Normal. No edema.      Left lower leg: Normal. No edema.   Skin:     General: Skin is warm and dry.      Capillary Refill: Capillary refill takes less than 2 seconds.      Coloration: Skin is not jaundiced or pale.      Findings: No bruising, erythema, lesion or rash.   Neurological:      General: No focal deficit present.      Mental Status: She is alert and oriented to person, place, and time.      Cranial Nerves: Cranial nerves 2-12 are intact. No cranial nerve deficit.      Sensory: Sensation is intact. No sensory deficit.      Motor: Motor function is intact. No weakness.      Coordination: Coordination normal. Finger-Nose-Finger Test abnormal.      Deep Tendon Reflexes: Reflexes normal.      Comments: Patient has tremor of the upper extremities with testing.   Psychiatric:         Attention and Perception: Attention and perception normal.         Mood and Affect: Mood is anxious.         Speech: Speech normal.         Behavior: Behavior normal. Behavior is cooperative.         Thought Content: Thought content normal.         Cognition and Memory: Cognition and memory normal.         Judgment: Judgment normal.          Labs Reviewed   CBC WITH AUTO DIFFERENTIAL - Abnormal        Result Value    WBC 15.4 (*)     nRBC 0.0      RBC 4.47      Hemoglobin 13.3      Hematocrit 39.9      MCV 89      MCH 29.8      MCHC 33.3      RDW 14.5      Platelets 240      Neutrophils % 65.1      Immature Granulocytes %, Automated 0.4      Lymphocytes % 25.9      Monocytes % 6.7      Eosinophils % 1.4      Basophils % 0.5      Neutrophils Absolute 10.04 (*)     Immature Granulocytes Absolute, Automated 0.06      Lymphocytes Absolute 4.00      Monocytes Absolute 1.03 (*)     Eosinophils Absolute 0.21      Basophils Absolute 0.08     COMPREHENSIVE METABOLIC PANEL - Abnormal    Glucose 81      Sodium 137      Potassium 3.8      Chloride 104      Bicarbonate 25      Anion Gap 12      Urea Nitrogen 27 (*)     Creatinine 1.69 (*)     eGFR 34 (*)     Calcium 9.4      Albumin 4.3      Alkaline Phosphatase 89      Total Protein 7.1      AST 17      Bilirubin, Total 0.5      ALT 19     LIPID PANEL - Abnormal    Cholesterol 186      HDL-Cholesterol 53.0      Cholesterol/HDL Ratio 3.5      LDL Calculated 91      VLDL 42 (*)     Triglycerides 211 (*)     Non HDL Cholesterol 133     TROPONIN I, HIGH SENSITIVITY - Normal    Troponin I, High Sensitivity 4      Narrative:     Less than 99th percentile of normal range cutoff-  Female and children under 18 years old <14 ng/L; Male <21 ng/L: Negative  Repeat testing should be performed if clinically indicated.     Female and children under 18 years old 14-50 ng/L; Male 21-50 ng/L:  Consistent with possible cardiac damage and possible increased clinical   risk. Serial measurements may help to assess extent of myocardial damage.     >50 ng/L: Consistent with cardiac damage, increased clinical risk and  myocardial infarction. Serial measurements may help assess extent of   myocardial damage.      NOTE: Children less than 1 year old may have higher baseline troponin   levels and results should be interpreted in conjunction with the overall   clinical context.     NOTE: Troponin I  testing is performed using a different   testing methodology at [facility] than at other   [facility]. Direct result comparisons should only   be made within the same method.   PROTIME-INR - Normal    Protime 10.9      INR 0.9     APTT - Normal    aPTT 28      Narrative:     The APTT is no longer used for monitoring Unfractionated Heparin Therapy. For monitoring Heparin Therapy, use the Heparin Assay.   POCT GLUCOSE - Normal    POCT Glucose 81     HEMOGLOBIN A1C   POCT GLUCOSE METER        CT brain attack head wo IV contrast   Final Result   No CT evidence of acute intracranial abnormality. Consider follow-up   with MRI as warranted.        Dr. Massiel Wall discussed the significance and urgency of this   critical finding by epic message with Dr. ANUP CARTAGENA on   11/11/2024 at 6:40 pm.  (**-RCF-**) Findings:  See findings.                       Signed by: Massiel Wall 11/11/2024 6:41 PM   Dictation workstation:   [id number]      Vascular US carotid artery duplex bilateral    (Results Pending)   MR brain wo IV contrast    (Results Pending)   MR angio head wo IV contrast    (Results Pending)        Procedures     Medical Decision Making  1800 - stroke alert called.  I met the patient in the room and she was able to hold her right arm up for 10 seconds without any evidence of weakness.  Patient is VAN negative.  Last well known was 1630.  1805 - patient was taken to CT scanner  1813 -patient returned to room from CAT scan and NIH was initiated  1815 - NIH was completed NIH was 1.  1817 - Stroke neurology consult placed  1829 - I discussed the patient with Dr. Colvin -stroke neurologist.  Evaluated the patient CT scan of the brain and did not see anything acute.  He believes the patient may be experiencing a migraine variant and/or possibly may be having a stroke he believes the patient needs an MRI before being discharged home.  The patient is not a TNKase or interventional radiology candidate  at this point time with an NIH of 1.  Patient was treated with oral aspirin.  1840 - I was contacted by the radiologist that the CT scan of the brain was negative for acute abnormality.    After my discussion with the stroke neurologist I had a discussion with  the patient and the patient's family about my plan to have the patient admitted to the hospital so that an MRI can be performed tomorrow along with other workup for her stroke symptoms.  Patient states that she is going on vacation on Wednesday and wants to be discharged by then.  Patient was then admitted to the hospital in stable satisfactory condition.    Amount and/or Complexity of Data Reviewed  ECG/medicine tests: independent interpretation performed.     Details: EKG was interpreted by myself at 6:02 PM reveals normal sinus rhythm with no acute ST or T wave changes noted.  The heart rate is 71 bpm.  The VT interval is 124 ms.  The QRS duration is 86 ms.  The QTc is 419 ms.  Axis is 75 degrees.         Diagnoses as of 11/12/24 0708   Stroke-like symptoms   Nonintractable headache, unspecified chronicity pattern, unspecified headache type   Renal insufficiency                    Vladimir Branham, DO  11/12/24 0709

## 2024-11-12 ENCOUNTER — APPOINTMENT (OUTPATIENT)
Dept: VASCULAR MEDICINE | Facility: HOSPITAL | Age: 64
End: 2024-11-12
Payer: COMMERCIAL

## 2024-11-12 ENCOUNTER — APPOINTMENT (OUTPATIENT)
Dept: RADIOLOGY | Facility: HOSPITAL | Age: 64
End: 2024-11-12
Payer: COMMERCIAL

## 2024-11-12 PROBLEM — G45.9 TIA (TRANSIENT ISCHEMIC ATTACK): Status: RESOLVED | Noted: 2024-11-11 | Resolved: 2024-11-12

## 2024-11-12 LAB
ANION GAP SERPL CALC-SCNC: 10 MMOL/L (ref 10–20)
BUN SERPL-MCNC: 25 MG/DL (ref 6–23)
CALCIUM SERPL-MCNC: 9.1 MG/DL (ref 8.6–10.3)
CHLORIDE SERPL-SCNC: 108 MMOL/L (ref 98–107)
CHOLEST SERPL-MCNC: 186 MG/DL (ref 0–199)
CHOLESTEROL/HDL RATIO: 3.5
CO2 SERPL-SCNC: 27 MMOL/L (ref 21–32)
CREAT SERPL-MCNC: 1.25 MG/DL (ref 0.5–1.05)
EGFRCR SERPLBLD CKD-EPI 2021: 48 ML/MIN/1.73M*2
EST. AVERAGE GLUCOSE BLD GHB EST-MCNC: 120 MG/DL
GLUCOSE SERPL-MCNC: 76 MG/DL (ref 74–99)
HBA1C MFR BLD: 5.8 %
HDLC SERPL-MCNC: 53 MG/DL
HOLD SPECIMEN: NORMAL
LDLC SERPL CALC-MCNC: 91 MG/DL
NON HDL CHOLESTEROL: 133 MG/DL (ref 0–149)
POTASSIUM SERPL-SCNC: 4 MMOL/L (ref 3.5–5.3)
SODIUM SERPL-SCNC: 141 MMOL/L (ref 136–145)
TRIGL SERPL-MCNC: 211 MG/DL (ref 0–149)
VLDL: 42 MG/DL (ref 0–40)

## 2024-11-12 PROCEDURE — 70551 MRI BRAIN STEM W/O DYE: CPT

## 2024-11-12 PROCEDURE — 83036 HEMOGLOBIN GLYCOSYLATED A1C: CPT | Mod: SAMLAB | Performed by: INTERNAL MEDICINE

## 2024-11-12 PROCEDURE — 80061 LIPID PANEL: CPT | Performed by: INTERNAL MEDICINE

## 2024-11-12 PROCEDURE — 2500000001 HC RX 250 WO HCPCS SELF ADMINISTERED DRUGS (ALT 637 FOR MEDICARE OP): Performed by: INTERNAL MEDICINE

## 2024-11-12 PROCEDURE — 70544 MR ANGIOGRAPHY HEAD W/O DYE: CPT

## 2024-11-12 PROCEDURE — 99222 1ST HOSP IP/OBS MODERATE 55: CPT | Performed by: STUDENT IN AN ORGANIZED HEALTH CARE EDUCATION/TRAINING PROGRAM

## 2024-11-12 PROCEDURE — 2500000004 HC RX 250 GENERAL PHARMACY W/ HCPCS (ALT 636 FOR OP/ED): Performed by: NURSE PRACTITIONER

## 2024-11-12 PROCEDURE — 93880 EXTRACRANIAL BILAT STUDY: CPT | Performed by: SURGERY

## 2024-11-12 PROCEDURE — 70498 CT ANGIOGRAPHY NECK: CPT | Performed by: RADIOLOGY

## 2024-11-12 PROCEDURE — 99232 SBSQ HOSP IP/OBS MODERATE 35: CPT | Performed by: NURSE PRACTITIONER

## 2024-11-12 PROCEDURE — 2500000005 HC RX 250 GENERAL PHARMACY W/O HCPCS: Performed by: INTERNAL MEDICINE

## 2024-11-12 PROCEDURE — 2500000002 HC RX 250 W HCPCS SELF ADMINISTERED DRUGS (ALT 637 FOR MEDICARE OP, ALT 636 FOR OP/ED): Performed by: INTERNAL MEDICINE

## 2024-11-12 PROCEDURE — G0378 HOSPITAL OBSERVATION PER HR: HCPCS

## 2024-11-12 PROCEDURE — 2500000001 HC RX 250 WO HCPCS SELF ADMINISTERED DRUGS (ALT 637 FOR MEDICARE OP): Performed by: NURSE PRACTITIONER

## 2024-11-12 PROCEDURE — 70496 CT ANGIOGRAPHY HEAD: CPT | Performed by: RADIOLOGY

## 2024-11-12 PROCEDURE — 2550000001 HC RX 255 CONTRASTS: Performed by: HOSPITALIST

## 2024-11-12 PROCEDURE — 94761 N-INVAS EAR/PLS OXIMETRY MLT: CPT

## 2024-11-12 PROCEDURE — 96374 THER/PROPH/DIAG INJ IV PUSH: CPT | Mod: 59

## 2024-11-12 PROCEDURE — 36415 COLL VENOUS BLD VENIPUNCTURE: CPT | Performed by: INTERNAL MEDICINE

## 2024-11-12 PROCEDURE — 80048 BASIC METABOLIC PNL TOTAL CA: CPT | Performed by: NURSE PRACTITIONER

## 2024-11-12 PROCEDURE — RXMED WILLOW AMBULATORY MEDICATION CHARGE

## 2024-11-12 PROCEDURE — 97161 PT EVAL LOW COMPLEX 20 MIN: CPT | Mod: GP | Performed by: PHYSICAL THERAPIST

## 2024-11-12 PROCEDURE — 97165 OT EVAL LOW COMPLEX 30 MIN: CPT | Mod: GO

## 2024-11-12 PROCEDURE — 70498 CT ANGIOGRAPHY NECK: CPT

## 2024-11-12 PROCEDURE — 93880 EXTRACRANIAL BILAT STUDY: CPT

## 2024-11-12 RX ORDER — ATORVASTATIN CALCIUM 80 MG/1
80 TABLET, FILM COATED ORAL NIGHTLY
Status: DISCONTINUED | OUTPATIENT
Start: 2024-11-12 | End: 2024-11-13 | Stop reason: HOSPADM

## 2024-11-12 RX ORDER — FLUOXETINE HYDROCHLORIDE 20 MG/1
40 CAPSULE ORAL DAILY
Status: DISCONTINUED | OUTPATIENT
Start: 2024-11-12 | End: 2024-11-13 | Stop reason: HOSPADM

## 2024-11-12 RX ORDER — HYDRALAZINE HYDROCHLORIDE 25 MG/1
25 TABLET, FILM COATED ORAL EVERY 6 HOURS PRN
Status: DISCONTINUED | OUTPATIENT
Start: 2024-11-14 | End: 2024-11-12

## 2024-11-12 RX ORDER — HYDRALAZINE HYDROCHLORIDE 20 MG/ML
10 INJECTION INTRAMUSCULAR; INTRAVENOUS
Status: DISCONTINUED | OUTPATIENT
Start: 2024-11-12 | End: 2024-11-12

## 2024-11-12 RX ORDER — FLUOXETINE HYDROCHLORIDE 20 MG/1
40 CAPSULE ORAL ONCE
Status: COMPLETED | OUTPATIENT
Start: 2024-11-12 | End: 2024-11-12

## 2024-11-12 RX ORDER — GABAPENTIN 300 MG/1
300 CAPSULE ORAL 3 TIMES DAILY
Status: DISCONTINUED | OUTPATIENT
Start: 2024-11-12 | End: 2024-11-13 | Stop reason: HOSPADM

## 2024-11-12 RX ORDER — CLOPIDOGREL BISULFATE 75 MG/1
75 TABLET ORAL DAILY
Qty: 21 TABLET | Refills: 0 | Status: SHIPPED | OUTPATIENT
Start: 2024-11-12 | End: 2024-12-04

## 2024-11-12 RX ORDER — HYDRALAZINE HYDROCHLORIDE 20 MG/ML
5 INJECTION INTRAMUSCULAR; INTRAVENOUS EVERY 6 HOURS PRN
Status: DISCONTINUED | OUTPATIENT
Start: 2024-11-12 | End: 2024-11-13 | Stop reason: HOSPADM

## 2024-11-12 RX ORDER — ACETAMINOPHEN 325 MG/1
650 TABLET ORAL EVERY 4 HOURS PRN
Status: DISCONTINUED | OUTPATIENT
Start: 2024-11-12 | End: 2024-11-13 | Stop reason: HOSPADM

## 2024-11-12 RX ORDER — HYDROCODONE BITARTRATE AND ACETAMINOPHEN 5; 325 MG/1; MG/1
1 TABLET ORAL EVERY 6 HOURS PRN
Status: DISCONTINUED | OUTPATIENT
Start: 2024-11-12 | End: 2024-11-13 | Stop reason: HOSPADM

## 2024-11-12 RX ORDER — LABETALOL HYDROCHLORIDE 5 MG/ML
10 INJECTION, SOLUTION INTRAVENOUS EVERY 10 MIN PRN
Status: DISCONTINUED | OUTPATIENT
Start: 2024-11-12 | End: 2024-11-13 | Stop reason: HOSPADM

## 2024-11-12 RX ORDER — GABAPENTIN 300 MG/1
300 CAPSULE ORAL ONCE
Status: COMPLETED | OUTPATIENT
Start: 2024-11-12 | End: 2024-11-12

## 2024-11-12 RX ORDER — LOSARTAN POTASSIUM 50 MG/1
50 TABLET ORAL DAILY
Status: DISCONTINUED | OUTPATIENT
Start: 2024-11-12 | End: 2024-11-13 | Stop reason: HOSPADM

## 2024-11-12 RX ORDER — ASPIRIN 81 MG/1
81 TABLET ORAL DAILY
Status: DISCONTINUED | OUTPATIENT
Start: 2024-11-12 | End: 2024-11-13 | Stop reason: HOSPADM

## 2024-11-12 RX ORDER — LEVOTHYROXINE SODIUM 88 UG/1
88 TABLET ORAL DAILY
Status: DISCONTINUED | OUTPATIENT
Start: 2024-11-12 | End: 2024-11-13 | Stop reason: HOSPADM

## 2024-11-12 RX ORDER — CLOPIDOGREL BISULFATE 75 MG/1
75 TABLET ORAL DAILY
Status: DISCONTINUED | OUTPATIENT
Start: 2024-11-12 | End: 2024-11-13 | Stop reason: HOSPADM

## 2024-11-12 RX ORDER — CYCLOBENZAPRINE HCL 10 MG
10 TABLET ORAL ONCE
Status: COMPLETED | OUTPATIENT
Start: 2024-11-12 | End: 2024-11-12

## 2024-11-12 RX ORDER — BUPROPION HYDROCHLORIDE 150 MG/1
150 TABLET ORAL EVERY MORNING
Status: DISCONTINUED | OUTPATIENT
Start: 2024-11-12 | End: 2024-11-13 | Stop reason: HOSPADM

## 2024-11-12 ASSESSMENT — COGNITIVE AND FUNCTIONAL STATUS - GENERAL
MOBILITY SCORE: 23
DAILY ACTIVITIY SCORE: 24
CLIMB 3 TO 5 STEPS WITH RAILING: A LITTLE

## 2024-11-12 ASSESSMENT — PAIN DESCRIPTION - LOCATION
LOCATION: HEAD
LOCATION: HEAD

## 2024-11-12 ASSESSMENT — PAIN SCALES - GENERAL
PAINLEVEL_OUTOF10: 0 - NO PAIN
PAINLEVEL_OUTOF10: 6
PAINLEVEL_OUTOF10: 7
PAINLEVEL_OUTOF10: 6
PAINLEVEL_OUTOF10: 7

## 2024-11-12 ASSESSMENT — ACTIVITIES OF DAILY LIVING (ADL)
ADL_ASSISTANCE: INDEPENDENT
ADL_ASSISTANCE: INDEPENDENT
LACK_OF_TRANSPORTATION: NO
BATHING_ASSISTANCE: INDEPENDENT

## 2024-11-12 ASSESSMENT — PAIN - FUNCTIONAL ASSESSMENT
PAIN_FUNCTIONAL_ASSESSMENT: 0-10

## 2024-11-12 NOTE — CONSULTS
CHF Navigator Nurse was consulted for CHF screening.    Notes, labs, flowsheets and medications reviewed in EMR.    Admitting Dx: Renal Insufficiency, Stroke like Symptoms, Headache  Cardiologist/PCP: Dr. Martinez  Last Echo: 5/9/23   EF: 65-70%  BNP: 62 on 4/26/24    Patient is not being treated for an exacerbation of Heart Failure as this time. Patient follows with Dr. Martinez and is ordered to follow in the office after discharge per Dr. Adler's note. No referral to the  Heart Failure Clinic at this time.

## 2024-11-12 NOTE — PROGRESS NOTES
Julia Mcguire is a 64 y.o. female on day 0 of admission presenting with TIA (transient ischemic attack).      Subjective   Patient sitting up in bed complaining of an occipital headache.  Rates the pain 7 out of 10.  She also still has some right hand numbness and tingling.  Patient wants to leave today because she is planning a trip to [place] to be with her siblings.       Objective     Last Recorded Vitals  BP (!) 190/88   Pulse 67   Temp 36.3 °C (97.4 °F) (Temporal)   Resp 14   Wt 74.5 kg (164 lb 3.9 oz)   SpO2 97%   Intake/Output last 3 Shifts:    Intake/Output Summary (Last 24 hours) at 11/12/2024 1733  Last data filed at 11/12/2024 1200  Gross per 24 hour   Intake 826.67 ml   Output --   Net 826.67 ml       Admission Weight  Weight: 74.5 kg (164 lb 3.9 oz) (11/11/24 1800)    Daily Weight  11/11/24 : 74.5 kg (164 lb 3.9 oz)    Image Results  MR brain wo IV contrast, MR angio head wo IV contrast  Narrative: Interpreted By:  Ivon Camacho,  Michelle Silveira   STUDY:  MR BRAIN WO IV CONTRAST; MR ANGIO HEAD WO IV CONTRAST;  11/12/2024  9:30 am      INDICATION:  Signs/Symptoms:rule out tia. left arm weakness and headache.  Stroke  protocol.          COMPARISON:  CT brain attack 11/11/2024.      ACCESSION NUMBER(S):  [id number]; [id number]      ORDERING CLINICIAN:  OBEY PRADHAN      TECHNIQUE:  Axial T2, FLAIR, DWI, gradient echo T2 and  sagittal and coronal T1  weighted images of brain were acquired.      Time-of-flight MRA of the head was performed. The images were  reviewed as source images and maximum intensity projections.      FINDINGS:  Brain:      CSF Spaces: The ventricles, sulci and basal cisterns are prominent,  in keeping with age related generalized parenchymal volume loss. No  extra-axial fluid collection..      Parenchyma: There are scattered small foci of increased signal on  diffusion-weighted imaging with corresponding diminished signal on  the ADC map and increased signal on FLAIR  imaging in the posterior  left frontal lobe and anterior left parietal lobe.. No hemorrhagic  transformation or mass effect. Mild periventricular and subcortical  FLAIR hyperintensities are nonspecific, however likely relates to  sequela of chronic microvascular ischemic disease given the patient's  age.      Paranasal Sinuses and Mastoids: Mild scattered ethmoid mucosal  thickening. Otherwise, the visualized paranasal sinuses are clear.  Trace nonspecific fluid within the right mastoid air cell. The left  mastoid is clear.      MRA of head:      Anterior circulation:  The intracranial segments of the internal  carotid arteries and proximal anterior and middle cerebral arteries  are patent. There are 3 A2 segments of the ACAs, a normal variant.      Posterior circulation:  The V4 segment on the right appears to  terminate in PICA, a normal variant. There is also fenestration of  the vessel. The V4 segment on the left is patent. The basilar artery  is patent. The P1 segment on the left is diminutive in caliber. The  P1 segment on the right is not visualized. There are bilateral  posterior communicating arteries. There is fullness of the distal  basilar artery particularly at the origin of the right superior  cerebellar artery.      Impression: MRI Brain:      1. Small scattered regions of acute to subacute infarct involving the  posterior left frontal and anterior parietal lobes without  hemorrhagic transformation or mass effect.  2. Mild burden of chronic microvascular ischemic disease and  generalized parenchymal volume loss.          MRA:      No evidence of major vessel cutoff or significant stenosis on MRA  head.      I personally reviewed the images/study and I agree with the findings  as stated by Resident Jordana Cash.      MACRO:  Jordana Cash discussed the significance and urgency of this critical  finding by epic secure chat with  OBEY PRADHAN on 11/12/2024 at  2:20 pm.  (**-RCF-**) Findings:  See  findings.      Signed by: Ivon Camacho 11/12/2024 4:31 PM  Dictation workstation:   [id number]  Vascular US carotid artery duplex bilateral  Preliminary Cardiology [place] [address]  Phone [phone], Fax [phone]           Preliminary Vascular Lab Report     VASC US CAROTID ARTERY DUPLEX BILATERAL       Patient Name:     TUESDAY L         Reading Physician: [id number] Do MANTILLA MD  Study Date:       11/12/2024        Ordering Provider: [id number] OBEY PRADHAN  MRN/PID:          [id number]          Fellow:  Accession#:       [id number]      Technologist:      Karan Berrios RVT  YOB: 1960          Technologist 2:  Gender:           F                 Encounter#:        [id number]  Admission Status: Inpatient         Location           [facility]                                      Performed:       Diagnosis/ICD: Other specified symptoms and signs involving the circulatory and                 respiratory systems-R09.89; Occlusion and stenosis of bilateral                 carotid arteries-I65.23  CPT Codes:     70548 Cerebrovascular Carotid Duplex scan complete       **CRITICAL RESULT**  Critical Result: Left ICA >70% stenosis.  Notification called to Dr. Pradhan on 11/12/2024 at 11:28:45 AM by James Berrios RVT.     PRELIMINARY CONCLUSIONS:     Right Carotid: Findings are consistent with 50 to 69% stenosis of the right proximal internal carotid artery. Turbulent flow seen by color Doppler. There are elevated velocities in the right ECA that are suggestive of disease. No evidence of hemodynamically significant stenosis of the right common carotid artery. The right vertebral artery is patent with antegrade flow. No evidence of hemodynamically significant stenosis in the right subclavian artery. Proximal ICA and ECA velocity may be underestimated  due to shadowing plaque.  Left Carotid: Findings are consistent with greater than 70% stenosis of the left proximal internal carotid artery. Turbulent flow seen by color Doppler. There are elevated velocities in the left ECA that are suggestive of disease. No evidence of hemodynamically significant stenosis of the left common carotid artery. The left vertebral artery is patent with antegrade flow. No evidence of hemodynamically significant stenosis in the left subclavian artery.     Imaging & Doppler Findings:  Right Plaque Morph: The proximal right internal carotid artery demonstrates irregular, calcified and heterogenous plaque. The proximal right external carotid artery demonstrates irregular, calcified and shadowing plaque. The distal right common carotid artery demonstrates smooth and heterogenous plaque. The proximal right subclavian artery demonstrates heterogenous plaque. The right carotid bulb demonstrates irregular, calcified and heterogenous plaque.  Left Plaque Morph: The proximal left internal carotid artery demonstrates irregular, calcified and heterogenous plaque. The proximal left external carotid artery demonstrates irregular, calcified and heterogenous plaque. The proximal left common carotid artery demonstrates heterogenous plaque. The mid left common carotid artery demonstrates smooth and heterogenous plaque. The distal left common carotid artery demonstrates smooth and heterogenous plaque. The left carotid bulb demonstrates irregular, calcified and heterogenous plaque.      Right                        Left    PSV      EDV                PSV      EDV  104 cm/s           CCA P    92 cm/s  86 cm/s            CCA D    75 cm/s  208 cm/s 57 cm/s   ICA P    357 cm/s 132 cm/s  155 cm/s 43 cm/s   ICA M    108 cm/s 31 cm/s  94 cm/s  34 cm/s   ICA D    87 cm/s  36 cm/s  256 cm/s            ECA     215 cm/s  55 cm/s  12 cm/s Vertebral  64 cm/s  20 cm/s  75 cm/s          Subclavian 113 cm/s                      Right Left  ICA/CCA Ratio  2.4  4.8          VASCULAR PRELIMINARY REPORT  completed by Karan Berrios RVT on 11/12/2024 at 11:37:14 AM       ** Final **  ECG 12 lead  Normal sinus rhythm  Normal ECG  When compared with ECG of 26-MAY-2024 15:09,  No significant change was found      Physical Exam  Constitutional:       General: She is not in acute distress.     Appearance: She is not ill-appearing.      Comments: Awake alert and oriented x3   HENT:      Mouth/Throat:      Pharynx: Oropharynx is clear.   Eyes:      Pupils: Pupils are equal, round, and reactive to light.   Cardiovascular:      Rate and Rhythm: Normal rate and regular rhythm.      Heart sounds: Normal heart sounds.   Pulmonary:      Effort: No respiratory distress.      Breath sounds: Normal breath sounds. No wheezing or rhonchi.   Abdominal:      General: Abdomen is flat. Bowel sounds are normal. There is no distension.      Palpations: Abdomen is soft.      Tenderness: There is no abdominal tenderness.   Musculoskeletal:         General: No swelling.   Skin:     General: Skin is warm.   Neurological:      General: No focal deficit present.      Cranial Nerves: No cranial nerve deficit.      Sensory: No sensory deficit.      Motor: No weakness.      Coordination: Coordination normal.      Deep Tendon Reflexes: Reflexes normal.   Psychiatric:         Mood and Affect: Mood normal.         Behavior: Behavior normal.         Thought Content: Thought content normal.         Judgment: Judgment normal.      Relevant Results    Results for orders placed or performed during the hospital encounter of 11/11/24 (from the past 24 hours)   ECG 12 lead   Result Value Ref Range    Ventricular Rate 71 BPM    Atrial Rate 71 BPM    OK Interval 124 ms    QRS Duration 86 ms    QT Interval 386 ms    QTC Calculation(Bazett) 419 ms    P Axis 62 degrees    R Axis 75 degrees    T Axis 55 degrees    QRS Count 11 beats    Q Onset 217 ms    P Onset 155 ms    P Offset 194 ms     T Offset 410 ms    QTC Fredericia 408 ms   POCT GLUCOSE   Result Value Ref Range    POCT Glucose 81 74 - 99 mg/dL   CBC and Auto Differential   Result Value Ref Range    WBC 15.4 (H) 4.4 - 11.3 x10*3/uL    nRBC 0.0 0.0 - 0.0 /100 WBCs    RBC 4.47 4.00 - 5.20 x10*6/uL    Hemoglobin 13.3 12.0 - 16.0 g/dL    Hematocrit 39.9 36.0 - 46.0 %    MCV 89 80 - 100 fL    MCH 29.8 26.0 - 34.0 pg    MCHC 33.3 32.0 - 36.0 g/dL    RDW 14.5 11.5 - 14.5 %    Platelets 240 150 - 450 x10*3/uL    Neutrophils % 65.1 40.0 - 80.0 %    Immature Granulocytes %, Automated 0.4 0.0 - 0.9 %    Lymphocytes % 25.9 13.0 - 44.0 %    Monocytes % 6.7 2.0 - 10.0 %    Eosinophils % 1.4 0.0 - 6.0 %    Basophils % 0.5 0.0 - 2.0 %    Neutrophils Absolute 10.04 (H) 1.20 - 7.70 x10*3/uL    Immature Granulocytes Absolute, Automated 0.06 0.00 - 0.70 x10*3/uL    Lymphocytes Absolute 4.00 1.20 - 4.80 x10*3/uL    Monocytes Absolute 1.03 (H) 0.10 - 1.00 x10*3/uL    Eosinophils Absolute 0.21 0.00 - 0.70 x10*3/uL    Basophils Absolute 0.08 0.00 - 0.10 x10*3/uL   Comprehensive metabolic panel   Result Value Ref Range    Glucose 81 74 - 99 mg/dL    Sodium 137 136 - 145 mmol/L    Potassium 3.8 3.5 - 5.3 mmol/L    Chloride 104 98 - 107 mmol/L    Bicarbonate 25 21 - 32 mmol/L    Anion Gap 12 10 - 20 mmol/L    Urea Nitrogen 27 (H) 6 - 23 mg/dL    Creatinine 1.69 (H) 0.50 - 1.05 mg/dL    eGFR 34 (L) >60 mL/min/1.73m*2    Calcium 9.4 8.6 - 10.3 mg/dL    Albumin 4.3 3.4 - 5.0 g/dL    Alkaline Phosphatase 89 33 - 136 U/L    Total Protein 7.1 6.4 - 8.2 g/dL    AST 17 9 - 39 U/L    Bilirubin, Total 0.5 0.0 - 1.2 mg/dL    ALT 19 7 - 45 U/L   Troponin I, High Sensitivity   Result Value Ref Range    Troponin I, High Sensitivity 4 0 - 13 ng/L   Protime-INR   Result Value Ref Range    Protime 10.9 9.8 - 12.8 seconds    INR 0.9 0.9 - 1.1   APTT   Result Value Ref Range    aPTT 28 27 - 38 seconds   Lipid Panel   Result Value Ref Range    Cholesterol 186 0 - 199 mg/dL     HDL-Cholesterol 53.0 mg/dL    Cholesterol/HDL Ratio 3.5     LDL Calculated 91 <=99 mg/dL    VLDL 42 (H) 0 - 40 mg/dL    Triglycerides 211 (H) 0 - 149 mg/dL    Non HDL Cholesterol 133 0 - 149 mg/dL   Basic Metabolic Panel   Result Value Ref Range    Glucose 76 74 - 99 mg/dL    Sodium 141 136 - 145 mmol/L    Potassium 4.0 3.5 - 5.3 mmol/L    Chloride 108 (H) 98 - 107 mmol/L    Bicarbonate 27 21 - 32 mmol/L    Anion Gap 10 10 - 20 mmol/L    Urea Nitrogen 25 (H) 6 - 23 mg/dL    Creatinine 1.25 (H) 0.50 - 1.05 mg/dL    eGFR 48 (L) >60 mL/min/1.73m*2    Calcium 9.1 8.6 - 10.3 mg/dL   SST TOP   Result Value Ref Range    Extra Tube Hold for add-ons.          Assessment/Plan      Tuesday BISI Mcguire is a 64 y.o. female  Who presented to the emergency room for headache and difficulty using the right arm.  On presentation, vital signs grossly within normal limits.  ER provider reported NIH score of 1 with some difficulty with finger-to-nose on the right side.  Blood workup showed WBC 15,400 and creatinine 1.69.  CT scan of the brain did not show any acute findings.  Patient was given in the emergency room 325 mg of aspirin and IV fluids and then admitted to the medical service for further investigation and management.   Assessment & Plan    Acute to subacute infarct involving the posterior left frontal and anterior parietal lobes  -Aspirin 81 mg, start Plavix 75 mg for 21 days and continue high-dose statin  -Discussed case with teleneurology  -NIHSS 1  -Continue stroke protocol  -Follow-up outpatient with stroke neurology  -PT OT consulted and recommend outpatient physical therapy    Lateral carotid stenosis  -Dr. Emmett Saxena consulted and recommended CTA  -- R ICA 50-69% stenosis and Left ICA > 70% stenosis   -Follow-up outpatient    Hypertension  -Will allow some permissive hypertension however blood pressure is greater than 190 systolic  -Will continue Cozaar 50 mg and give as needed hydralazine if systolic greater than  160.     Hyperlipidemia  -Controlled by PCP    Hypothyroidism  -Continue levothyroxine    DVT prophylaxis Lovenox and SCDs  Disposition Home tomorrow if patient remains medically stable         Pooaj Dickinson, APRN-CNP

## 2024-11-12 NOTE — CARE PLAN
The patient's goals for the shift include      The clinical goals for the shift include NIH scale of 0 by d/c      Problem: Pain - Adult  Goal: Verbalizes/displays adequate comfort level or baseline comfort level  Outcome: Progressing     Problem: Safety - Adult  Goal: Free from fall injury  Outcome: Progressing     Problem: Discharge Planning  Goal: Discharge to home or other facility with appropriate resources  Outcome: Progressing     Problem: Chronic Conditions and Co-morbidities  Goal: Patient's chronic conditions and co-morbidity symptoms are monitored and maintained or improved  Outcome: Progressing     Problem: Pain  Goal: Takes deep breaths with improved pain control throughout the shift  Outcome: Progressing  Goal: Turns in bed with improved pain control throughout the shift  Outcome: Progressing  Goal: Walks with improved pain control throughout the shift  Outcome: Progressing  Goal: Performs ADL's with improved pain control throughout shift  Outcome: Progressing  Goal: Participates in PT with improved pain control throughout the shift  Outcome: Progressing  Goal: Free from opioid side effects throughout the shift  Outcome: Progressing  Goal: Free from acute confusion related to pain meds throughout the shift  Outcome: Progressing

## 2024-11-12 NOTE — CONSULTS
Inpatient consult to Cardiology  Consult performed by: Ramiro Adler MD  Consult ordered by: Pooja Dickinson, GASTON-CNP  Reason for consult: TIA      History Of Present Illness:    Mrs. Julia Mcguire is a 64 y.o. former smoker female being consulted by the Cardiology team for TIA. Patient with prior medical history significant for bilateral carotid stenosis, non-obstructive CAD, hypertension, hyperlipidemia, shortness of breath, anxiety/depression, hypothyroidism, diastolic congestive heart failure, depression, anxiety, obstructive sleep apnea, former nicotine dependence, rib fractures secondary to a fall, fibromyalgia, cervical vertebral degenerative disease with varying degrees of spinal canal and neural foraminal narrowing s/p discectomies and fusion at C5-T1 levels, obesity, GERD, insomnia, and BPPV. She presented to ED [facility] on 11/11/2024 complaining of headache and difficulty using the right arm. She endorsed progressively worsening weakness in her right arm. She felt that she was not able to hold up anything. She did also have some tingling and numbness in her right hand. Denies having blurry vision, weakness in her lower extremities, speech changes, lightheadedness, nausea or vomiting. She said that she is supposed to be taking aspirin on a daily basis but she only takes it sometimes, but she is compliant with statins. Pertinent findings on blood workup showed acute kidney injury and leukocytosis. CT of the head came back grossly within normal limits. Patient has been admitted for clinical compensation.     Last Recorded Vitals:  Vitals:    11/12/24 0642 11/12/24 0700 11/12/24 1117 11/12/24 1200   BP:  156/88  (!) 158/96   BP Location:  Right arm     Patient Position:  Lying     Pulse:  63  63   Resp:  18  16   Temp:  35.5 °C (95.9 °F)  36 °C (96.8 °F)   TempSrc:  Temporal     SpO2: 95% 95% 94% 95%   Weight:       Height:           Last Labs:  CBC - 11/11/2024:  6:18 PM  15.4  13.3 240    39.9      CMP - 11/12/2024:  5:59 AM  9.1 7.1 17 --- 0.5   _ 4.3 19 89      PTT - 11/11/2024:  6:18 PM  0.9   10.9 28     Troponin I, High Sensitivity   Date/Time Value Ref Range Status   11/11/2024 06:18 PM 4 0 - 13 ng/L Final     Troponin I   Date/Time Value Ref Range Status   04/26/2023 01:57 PM 7 0 - 13 ng/L Final     Comment:     .  Less than 99th percentile of normal range cutoff-  Female and children under 18 years old <14 ng/L; Male <21 ng/L: Negative  Repeat testing should be performed if clinically indicated.   .  Female and children under 18 years old 14-50 ng/L; Male 21-50 ng/L:  Consistent with possible cardiac damage and possible increased clinical   risk. Serial measurements may help to assess extent of myocardial damage.   .  >50 ng/L: Consistent with cardiac damage, increased clinical risk and  myocardial infarction. Serial measurements may help assess extent of   myocardial damage.   .   NOTE: Children less than 1 year old may have higher baseline troponin   levels and results should be interpreted in conjunction with the overall   clinical context.   .  NOTE: Troponin I testing is performed using a different   testing methodology at [facility] than at other   [facility]. Direct result comparisons should only   be made within the same method.     04/26/2023 12:57 PM 7 0 - 13 ng/L Final     Comment:     .  Less than 99th percentile of normal range cutoff-  Female and children under 18 years old <14 ng/L; Male <21 ng/L: Negative  Repeat testing should be performed if clinically indicated.   .  Female and children under 18 years old 14-50 ng/L; Male 21-50 ng/L:  Consistent with possible cardiac damage and possible increased clinical   risk. Serial measurements may help to assess extent of myocardial damage.   .  >50 ng/L: Consistent with cardiac damage, increased clinical risk and  myocardial infarction. Serial measurements may help assess extent of   myocardial damage.    .   NOTE: Children less than 1 year old may have higher baseline troponin   levels and results should be interpreted in conjunction with the overall   clinical context.   .  NOTE: Troponin I testing is performed using a different   testing methodology at [facility] than at other   [facility] hospitals. Direct result comparisons should only   be made within the same method.     11/18/2022 12:30 PM 4 0 - 13 ng/L Final     Comment:     .  Less than 99th percentile of normal range cutoff-  Female and children under 18 years old <14 ng/L; Male <21 ng/L: Negative  Repeat testing should be performed if clinically indicated.   .  Female and children under 18 years old 14-50 ng/L; Male 21-50 ng/L:  Consistent with possible cardiac damage and possible increased clinical   risk. Serial measurements may help to assess extent of myocardial damage.   .  >50 ng/L: Consistent with cardiac damage, increased clinical risk and  myocardial infarction. Serial measurements may help assess extent of   myocardial damage.   .   NOTE: Children less than 1 year old may have higher baseline troponin   levels and results should be interpreted in conjunction with the overall   clinical context.   .  NOTE: Troponin I testing is performed using a different   testing methodology at [facility] than at other   [facility] hospitals. Direct result comparisons should only   be made within the same method.       BNP   Date/Time Value Ref Range Status   04/26/2023 12:57 PM 62 0 - 99 pg/mL Final     Comment:     .  <100 pg/mL - Heart failure unlikely  100-299 pg/mL - Intermediate probability of acute heart  .               failure exacerbation. Correlate with clinical  .               context and patient history.    >=300 pg/mL - Heart Failure likely. Correlate with clinical  .               context and patient history.  BNP testing is performed using different testing   methodology at [facility] than at other   system  [facility] [place]. Direct result comparisons should   only be made within the same method.       LDL Calculated   Date/Time Value Ref Range Status   11/12/2024 05:59 AM 91 <=99 mg/dL Final     Comment:                                 Near   Borderline      AGE      Desirable  Optimal    High     High     Very High     0-19 Y     0 - 109     ---    110-129   >/= 130     ----    20-24 Y     0 - 119     ---    120-159   >/= 160     ----      >24 Y     0 -  99   100-129  130-159   160-189     >/=190       VLDL   Date/Time Value Ref Range Status   11/12/2024 05:59 AM 42 (H) 0 - 40 mg/dL Final   07/07/2021 09:30 AM 31 0 - 40 mg/dL Final   11/20/2019 05:32 AM 31 0 - 40 mg/dL Final      Last I/O:  I/O last 3 completed shifts:  In: 466.7 (6.3 mL/kg) [IV Piggyback:466.7]  Out: - (0 mL/kg)   Weight: 74.5 kg     Past Cardiology Tests (Last 3 Years):  EKG:  ECG 12 lead  (Preliminary)      ECG 12 Lead 05/26/2024    Echo:  No results found for this or any previous visit from the past 1095 days.    Ejection Fractions:  No results found for: \"EF\"  Cath:  No results found for this or any previous visit from the past 1095 days.    Stress Test:  No results found for this or any previous visit from the past 1095 days.    Cardiac Imaging:  No results found for this or any previous visit from the past 1095 days.      Past Medical History:  She has a past medical history of Anxiety, Depression, Disease of thyroid gland, Hyperlipidemia, Hypertension, Personal history of other diseases of the musculoskeletal system and connective tissue (04/28/2022), and Sleep apnea.    Past Surgical History:  She has a past surgical history that includes Other surgical history (11/27/2019); Other surgical history (11/27/2019); Other surgical history (11/27/2019); Other surgical history (11/27/2019); Other surgical history (11/27/2019); Other surgical history (11/27/2019); Other surgical history (12/01/2019); Other surgical history (03/10/2020); Cardiac " catheterization; and Cervical spine surgery.      Social History:  She reports that she has quit smoking. Her smoking use included cigarettes. She has been exposed to tobacco smoke. She has never used smokeless tobacco. She reports current alcohol use. She reports that she does not use drugs.    Family History:  Family History   Problem Relation Name Age of Onset    Other (Bilateral carotid artery stenosis) Mother      Colonic polyp Mother      Thyroid disease Mother      Cervical cancer Mother          Primary    Other (cva) Mother      Osteoporosis Mother      Emphysema Father      Stroke Sister      Colonic polyp Sister      Diabetes Sister      Hypertension Sister      Heart attack Sister      Heart attack Sister      Other (Cardiac disorder) Half-Brother      Other (cardiac disorder) Other grandmother     Lung cancer Other grandmother     Throat cancer Other grandfather         Allergies:  Codeine, Etodolac, Naproxen, and Sulfamethoxazole    Inpatient Medications:  Scheduled medications   Medication Dose Route Frequency    aspirin  81 mg oral Daily    atorvastatin  80 mg oral Nightly    buPROPion XL  150 mg oral q AM    influenza  0.5 mL intramuscular During hospitalization    FLUoxetine  40 mg oral Daily    gabapentin  300 mg oral TID    levothyroxine  88 mcg oral Daily    losartan  50 mg oral Daily     PRN medications   Medication    acetaminophen    hydrALAZINE    Followed by    [START ON 11/14/2024] hydrALAZINE    HYDROcodone-acetaminophen    labetaloL    oxygen     Continuous Medications   Medication Dose Last Rate     Outpatient Medications:  Current Outpatient Medications   Medication Instructions    aspirin 81 mg EC tablet 1 tablet, Daily    atorvastatin (Lipitor) 80 mg tablet TAKE 1 TABLET BY MOUTH EVERY DAY AT BEDTIME    buPROPion XL (WELLBUTRIN XL) 150 mg, oral, Every morning, Do not crush, chew, or split.    cetirizine (ZYRTEC) 10 mg, oral, Daily    FLUoxetine (PROZAC) 40 mg, oral, Daily     fluticasone (Flonase) 50 mcg/actuation nasal spray 1 spray, Each Nostril, Daily, Shake gently. Before first use, prime pump. After use, clean tip and replace cap.    gabapentin (NEURONTIN) 300 mg, oral, 3 times daily    levothyroxine (SYNTHROID, LEVOXYL) 88 mcg, oral, Daily    losartan (COZAAR) 50 mg, oral, Daily, May take second tab if bp > 140/90    tiZANidine (ZANAFLEX) 4 mg, oral, Every 6 hours PRN       Physical Exam:  General: alert, oriented and in no acute distress  HEENT: NC/AT; EOMI; PERRLA, external ear is normal  Neck: supple; trachea midline; no masses; no JVD  Chest: clear breath sounds bilaterally; no wheezing  Cardio: regular rhythm, S1S2 normal, no murmurs  Abdomen: Soft, non-tender, non-distension, no organomegaly  Extremities: no clubbing/cyanosis/edema  Neuro: Grossly intact     Psychiatric: Normal mood and affect      Assessment/Plan     Mrs. Julia Mcguire is a 64 y.o. former smoker female being consulted by the Cardiology team for TIA. Patient with prior medical history significant for bilateral carotid stenosis, non-obstructive CAD, hypertension, hyperlipidemia, shortness of breath, anxiety/depression, hypothyroidism, diastolic congestive heart failure, depression, anxiety, obstructive sleep apnea, former nicotine dependence, rib fractures secondary to a fall, fibromyalgia, cervical vertebral degenerative disease with varying degrees of spinal canal and neural foraminal narrowing s/p discectomies and fusion at C5-T1 levels, obesity, GERD, insomnia, and BPPV. She presented to ED [facility] on 11/11/2024 complaining of headache and difficulty using the right arm. She endorsed progressively worsening weakness in her right arm. She felt that she was not able to hold up anything. She did also have some tingling and numbness in her right hand. Denies having blurry vision, weakness in her lower extremities, speech changes, lightheadedness, nausea or vomiting. She said that she is supposed to  be taking aspirin on a daily basis but she only takes it sometimes, but she is compliant with statins. Pertinent findings on blood workup showed acute kidney injury and leukocytosis. CT of the head came back grossly within normal limits. Patient has been admitted for clinical compensation.    Assessment    # Heart Failure with Preserved LV systolic function  - Keep daily weights. Patient's admission weight is 164 lb.  - Low sodium diet (2g).  - 2000mL fluid restriction.  - Strict I&Os.  - Electrolyte control and daily BMP including magnesium.  - General recommendations for heart failure, including low-sodium diet, fluid restriction, daily weight and adherence to medication.   - Keep Losartan 50mg daily.  - Please refer the patient to Cardiology Clinic upon discharge.     # TIA / Bilateral carotid stenosis  - R ICA 50-69% stenosis and Left ICA > 70% stenosis.  - Keep ASA, high intensity statins.  - Follow up with Neurology team and Vascular team.    # Hypertension  - Controlled blood pressure.  - Keep current medications including Losartan 50mg daily.  - Patient counseled to keep a healthy lifestyle including regular exercise and low-sodium diet.  - Recommended home blood pressure monitoring.  - Goal of BP < 130/80mmHg.     # Hyperlipidemia  - Controlled by PCP.  - Keep home medication with Atorvastatin 80mg daily.  - Counseled on healthy diet and regular exercise.     # Hypothyroidism  - Keep Levothyroxine 88mcg  daily.    Thank you for allowing me to participate in the care of this patient. Please reach me out if you have any questions or if you need any clarifications regarding the patient's care.      Peripheral IV 11/11/24 20 G Right Hand (Active)   Site Assessment Clean;Dry;Intact 11/12/24 1100   Dressing Type Transparent 11/12/24 1100   Dressing Status Clean;Dry;Occlusive 11/12/24 1100   Number of days: 1       Code Status:  Full Code    Ramiro Adler MD  Cardiology

## 2024-11-12 NOTE — PROGRESS NOTES
Physical Therapy    Physical Therapy Evaluation    Patient Name: Julia Mcguire  MRN: [id number]  Today's Date: 11/12/2024   Time Calculation  Start Time: 1416  Stop Time: 1441  Time Calculation (min): 25 min    Assessment/Plan   Skilled PT intervention is indicated due to patient presents with deficits in gait, balance and coordination stair negotiation, strength, and safety awareness.   Patient with strength balance and coordination deficits RLE compared to LLE.  Recommend continued physical therapy intervention at a low intensity to improve strength, balance, mobility and reduce fall risk.      PT Assessment  PT Assessment Results: Decreased strength, Decreased endurance, Impaired balance, Decreased mobility, Decreased safety awareness, Pain  Rehab Prognosis: Good  Barriers to Discharge: none  Evaluation/Treatment Tolerance: Patient tolerated treatment well  End of Session Communication: PCT/NA/CTA  End of Session Patient Position: Up in bathroom  IP OR SWING BED PT PLAN  Inpatient or Swing Bed: Inpatient  PT Plan  Treatment/Interventions: Gait training, Balance training, Neuromuscular re-education, Strengthening, Endurance training, Therapeutic activity, Therapeutic exercise  PT Plan: Ongoing PT  PT Frequency: 3 times per week  PT Discharge Recommendations: Low intensity level of continued care (outpatient PT for high -level balance training, mobility, strengthening)  PT Recommended Transfer Status: Independent  PT - OK to Discharge: Yes (once medically appropriate)    Subjective     Current Problem:  Patient Active Problem List   Diagnosis    Anxiety    Benign essential hypertension    Bilateral occipital neuralgia    BPPV (benign paroxysmal positional vertigo)    Bulging of cervical intervertebral disc    Bulging of thoracic intervertebral disc    Cervical myelopathy    Cervical radiculopathy, chronic    Cervical spinal stenosis    Spinal stenosis    Lumbosacral spondylosis    Thoracic spondylosis     Depression with anxiety    Diverticulitis, colon    Fatigue    GERD (gastroesophageal reflux disease)    Hematuria    Hot flash, menopausal    Hypercholesterolemia    Hypothyroidism    Insomnia    Fibromyalgia    FRANCES (obstructive sleep apnea)    Palpitations    Shingles    Shortness of breath on exertion    Spondylosis of cervical region without myelopathy or radiculopathy    Chest pain    Vitamin D deficiency    Disc disorder    Candida infection    Degeneration of cervical intervertebral disc    Cervical arthritis with myelopathy    Failed neck syndrome    Patellar tendinitis of left knee    Urinary, incontinence, stress female    Chronic pain disorder    Hemangioma of skin and subcutaneous tissue    Melanocytic nevi of trunk    Actinic keratosis    Xerosis cutis    Other melanin hyperpigmentation    10 year risk of MI or stroke < 7.5%       General Visit Information:  General  Reason for Referral: impaired mobility; 64 year old female admitted for possible CVA MRI Brain: 1. Small scattered regions of acute to subacute infarct involving the posterior left frontal lobe without hemorrhagic transformation or mass effect. 2. Mild burden of chronic microvascular ischemic disease and generalized parenchymal volume loss.  Referred By: Yong Espino MD  Past Medical History Relevant to Rehab: Anxiety  Depression  Disease of thyroid gland  Hyperlipidemia  Hypertension  Personal history of other diseases of the musculoskeletal system and connective tissue 04/28/2022 History of joint pain  Sleep apnea  Family/Caregiver Present: No  Co-Treatment: OT  Co-Treatment Reason: to maximize pt safety  Patient Position Received: Bed, 3 rail up, Alarm off, not on at start of session  General Comment: pt states she is feeling better and has more control over her RUE. headache is still lingering    Home Living:  Home Living  Type of Home: House  Lives With: Alone  Home Layout: One level, Laundry in basement, Stairs to alternate level  "without rails  Home Access: Stairs to enter with rails (4 DELORIS side of home with no rails. 4 DELORIS front of home with rail)  Bathroom Shower/Tub: Tub/shower unit  Bathroom Equipment: None    Prior Level of Function:  Prior Function Per Pt/Caregiver Report  ADL Assistance: Independent  Homemaking Assistance: Independent  Ambulatory Assistance: Independent (no device)    Precautions:  Precautions  Medical Precautions: Fall precautions    Vital Signs:  Vital Signs  Vital Signs Comment: no concerns  Objective     Pain:  Pain Assessment  Pain Assessment: 0-10  0-10 (Numeric) Pain Score:  (c/o headache; states her back  \"always hurts\"-no number given)    Cognition:  Cognition  Overall Cognitive Status: Within Functional Limits  Orientation Level: Oriented X4  Safety/Judgement: Exceptions to WFL  Insight: Mild    General Assessments:  General Observation  General Observation: pleasant and cooperative   Activity Tolerance  Endurance: Endurance does not limit participation in activity  Sensation  Stereognosis: No apparent deficits  Strength  Strength Comments: RLE grossly 4-/5; LLE grossly 4+/5     Coordination  Movements are Fluid and Coordinated: No (R LE defiicts)  Heel to Shin: Impaired  Postural Control  Postural Control: Within Functional Limits  Static Sitting Balance  Static Sitting-Balance Support: Feet supported  Static Sitting-Level of Assistance: Independent  Dynamic Sitting Balance  Dynamic Sitting-Balance Support: Feet supported  Dynamic Sitting-Level of Assistance: Independent  Static Standing Balance  Static Standing-Balance Support: No upper extremity supported  Static Standing-Level of Assistance: Contact guard  Static Standing-Comment/Number of Minutes: standing feet apart eyes open mod perturbations with mild sway one direction otherwise none; standing feet apart eyes closed no sway; standing feet together eyes open 10 sec no LOB; standing tandem with mild sway/LOB able to self correct;  single leg stand : " L=10 sec steady no sway/LOB;  RLE=mildly unsteady/sway but able to maintain balance  Dynamic Standing Balance  Dynamic Standing-Balance Support: No upper extremity supported  Dynamic Standing-Level of Assistance: Close supervision  Dynamic Standing-Balance: Turning  Dynamic Standing-Comments: 360 deg mild incoordination and slower towards right compared to left;  side-stepping to left and right with slower speed and mildly less coordinated towards R compared to L    Functional Assessments:     Bed Mobility  Bed Mobility: Yes  Bed Mobility 1  Bed Mobility 1: Supine to sitting  Level of Assistance 1: Independent  Transfers  Transfer: Yes  Transfer 1  Technique 1: Sit to stand, Stand to sit  Transfer Level of Assistance 1: Independent  Ambulation/Gait Training  Ambulation/Gait Training Performed: Yes  Ambulation/Gait Training 1  Surface 1: Level tile  Device 1: No device  Gait Support Devices: Gait belt  Assistance 1: Independent  Quality of Gait 1: Diminished heel strike (less stance time on R; mildly impaired coordination RLE)  Comments/Distance (ft) 1: 75ft     Outcome Measures:  [facility] Basic Mobility  Turning from your back to your side while in a flat bed without using bedrails: None  Moving from lying on your back to sitting on the side of a flat bed without using bedrails: None  Moving to and from bed to chair (including a wheelchair): None  Standing up from a chair using your arms (e.g. wheelchair or bedside chair): None  To walk in hospital room: None  Climbing 3-5 steps with railing: A little  Basic Mobility - Total Score: 23                            Goals:  Encounter Problems       Encounter Problems (Active)       PT Problem       PT Goal 1       Start:  11/12/24    Expected End:  11/25/24       SLS level surface 20 sec no Sway/LOB RLE 2/3 trials         PT Goal 2       Start:  11/12/24    Expected End:  11/25/24       Ambulate with good trunk rotation and arm swing and equal stance time and stride between  Zana x150'         PT Goal 3       Start:  11/12/24    Expected End:  11/25/24       Patient will be able to  tandem for 30 sec each foot leading wihtout sway or LOB            Pain - Adult            Education Documentation  Mobility Training, taught by Aleida Knowles, PT at 11/12/2024  3:26 PM.  Learner: Patient  Readiness: Acceptance  Method: Explanation  Response: Verbalizes Understanding  Comment: balance training and coordination due to mild deficits in high level balance and coordination /mobility;  outpatient PT recommendations    Education Comments  No comments found.

## 2024-11-12 NOTE — PROGRESS NOTES
11/12/24 1712   Discharge Planning   Living Arrangements Alone   Support Systems Children   Assistance Needed None   Type of Residence Private residence   Number of Stairs to Enter Residence 4   Number of Stairs Within Residence 0   Do you have animals or pets at home? Yes   Type of Animals or Pets 1 cat   Who is requesting discharge planning? Provider   Home or Post Acute Services None   Expected Discharge Disposition Home   Does the patient need discharge transport arranged? No   Financial Resource Strain   How hard is it for you to pay for the very basics like food, housing, medical care, and heating? Not hard   Housing Stability   In the last 12 months, was there a time when you were not able to pay the mortgage or rent on time? N   In the past 12 months, how many times have you moved where you were living? 0   At any time in the past 12 months, were you homeless or living in a shelter (including now)? N   Transportation Needs   In the past 12 months, has lack of transportation kept you from medical appointments or from getting medications? no   In the past 12 months, has lack of transportation kept you from meetings, work, or from getting things needed for daily living? No     Care Transitions: Patient reviewed in care round meeting this AM and is not medically ready for discharge. Discharge pending MRI results. Met with patient and daughter Sandra @ bedside. Role of TCC explained. Demographics and contacts verified. PCP Dr. Singh. Preferred pharmacy is [facility]. Denies any difficulty obtaining/affording medications. She is independent at home with ADL's, drives self, and does not use any DME equipment. She is anxious to be discharged as she has a plan tripped to [place] tomorrow. Therapy evals pending. Discharge plan is home, denies needs or in home services. Care team to follow. Zeinab Santana RN/TCC    [phone] Received a message from Physical therapist this afternoon. Rosy completed,  recommending outpatient therapy. Notified Pooja Dickinson NP of therapist recommendations. Zeinab Santana RN/TCC

## 2024-11-12 NOTE — H&P
History Of Present Illness  Julia Mcguire is a 64 y.o. female  Who presented to the emergency room for headache and difficulty using the right arm.  On presentation, vital signs grossly within normal limits.  ER provider reported NIH score of 1 with some difficulty with finger-to-nose on the right side.  Blood workup showed WBC 15,400 and creatinine 1.69.  CT scan of the brain did not show any acute findings.  Patient was given in the emergency room 325 mg of aspirin and IV fluids and then admitted to the medical service for further investigation and management.  Upon encounter now, patient reports that she is still having some weakness in her right hand.  Not as severe as before.  She said that she was in her usual state of health when she suddenly started experiencing progressively worsening weakness in her right hip.  She felt that she was not able to hold up anything.  At the same time she was having occipital headaches.  She does have a history of headaches but she felt that this 1 is different.  She did also have some tingling and numbness in her right hand.  Denies having any blurry vision.  Denies having any weakness in her lower extremities.  No troubles finding any words.  No lightheadedness.  No nausea.  No vomiting.  She said that she is supposed to be taking aspirin on a daily basis but she only takes it sometimes.  But she takes her statin.    ROS  10 systems were reviewed and were negative except for those noted in the history of present illness.    Past Medical History  Past Medical History:   Diagnosis Date    Anxiety     Depression     Disease of thyroid gland     Hyperlipidemia     Hypertension     Personal history of other diseases of the musculoskeletal system and connective tissue 04/28/2022    History of joint pain    Sleep apnea      Pertinent medical history also documented in my below narrative    Surgical History  Past Surgical History:   Procedure Laterality Date    CARDIAC  CATHETERIZATION      CERVICAL SPINE SURGERY      OTHER SURGICAL HISTORY  11/27/2019    Appendectomy laparoscopic    OTHER SURGICAL HISTORY  11/27/2019    Dilation and curettage    OTHER SURGICAL HISTORY  11/27/2019    Tonsillectomy    OTHER SURGICAL HISTORY  11/27/2019    Colonoscopy    OTHER SURGICAL HISTORY  11/27/2019    Loop electrosurgical excision procedure    OTHER SURGICAL HISTORY  11/27/2019    Tubal ligation    OTHER SURGICAL HISTORY  12/01/2019    Cholecystectomy laparoscopic    OTHER SURGICAL HISTORY  03/10/2020    Carpal tunnel surgery      Pertinent surgical history also documented in my below narrative    Social History  She reports that she has quit smoking. Her smoking use included cigarettes. She has been exposed to tobacco smoke. She has never used smokeless tobacco. She reports current alcohol use. She reports that she does not use drugs.    Family History  Family History   Problem Relation Name Age of Onset    Other (Bilateral carotid artery stenosis) Mother      Colonic polyp Mother      Thyroid disease Mother      Cervical cancer Mother          Primary    Other (cva) Mother      Osteoporosis Mother      Emphysema Father      Stroke Sister      Colonic polyp Sister      Diabetes Sister      Hypertension Sister      Heart attack Sister      Heart attack Sister      Other (Cardiac disorder) Half-Brother      Other (cardiac disorder) Other grandmother     Lung cancer Other grandmother     Throat cancer Other grandfather         Allergies  Codeine, Etodolac, Naproxen, and Sulfamethoxazole    Medications Prior to Admission   Medication Sig Dispense Refill Last Dose/Taking    aspirin 81 mg EC tablet Take 1 tablet (81 mg) by mouth once daily.   Unknown    atorvastatin (Lipitor) 80 mg tablet TAKE 1 TABLET BY MOUTH EVERY DAY AT BEDTIME 90 tablet 3 Unknown    buPROPion XL (Wellbutrin XL) 150 mg 24 hr tablet Take 1 tablet (150 mg) by mouth once daily in the morning. Do not crush, chew, or split. 30  "tablet 5 Unknown    cetirizine (ZyrTEC) 10 mg tablet Take 1 tablet (10 mg) by mouth once daily. 30 tablet 2 Unknown    FLUoxetine (PROzac) 40 mg capsule Take 1 capsule (40 mg) by mouth once daily. 90 capsule 3 Unknown    fluticasone (Flonase) 50 mcg/actuation nasal spray Administer 1 spray into each nostril once daily. Shake gently. Before first use, prime pump. After use, clean tip and replace cap. 16 g 11 Unknown    gabapentin (Neurontin) 300 mg capsule Take 1 capsule (300 mg) by mouth 3 times a day. 90 capsule 11 Unknown    levothyroxine (Synthroid, Levoxyl) 88 mcg tablet Take 1 tablet (88 mcg) by mouth once daily. 90 tablet 3 Unknown    losartan (Cozaar) 50 mg tablet Take 1 tablet (50 mg) by mouth once daily. May take second tab if bp > 140/90 90 tablet 3 Unknown    [] predniSONE (Deltasone) 20 mg tablet Take 1 tablet (20 mg) by mouth once daily for 5 days. 5 tablet 0     tiZANidine (Zanaflex) 4 mg tablet Take 1 tablet (4 mg) by mouth every 6 hours if needed for muscle spasms. 30 tablet 3 Unknown        Last Recorded Vitals  Blood pressure 118/78, pulse 60, temperature 36.4 °C (97.6 °F), temperature source Temporal, resp. rate 18, height 1.575 m (5' 2\"), weight 74.5 kg (164 lb 3.9 oz), SpO2 94%.    Physical Exam  Constitutional:       General: She is not in acute distress.     Appearance: She is not ill-appearing.      Comments: Awake alert and oriented x3   HENT:      Mouth/Throat:      Pharynx: Oropharynx is clear.   Eyes:      Pupils: Pupils are equal, round, and reactive to light.   Cardiovascular:      Rate and Rhythm: Normal rate and regular rhythm.      Heart sounds: Normal heart sounds.   Pulmonary:      Effort: No respiratory distress.      Breath sounds: Normal breath sounds. No wheezing or rhonchi.   Abdominal:      General: Abdomen is flat. Bowel sounds are normal. There is no distension.      Palpations: Abdomen is soft.      Tenderness: There is no abdominal tenderness.   Musculoskeletal:    "      General: No swelling.   Skin:     General: Skin is warm.   Neurological:      General: No focal deficit present.      Cranial Nerves: No cranial nerve deficit.      Sensory: No sensory deficit.      Motor: No weakness.      Coordination: Coordination normal.      Deep Tendon Reflexes: Reflexes normal.   Psychiatric:         Mood and Affect: Mood normal.         Behavior: Behavior normal.         Thought Content: Thought content normal.         Judgment: Judgment normal.           Relevant Results  Results for orders placed or performed during the hospital encounter of 11/11/24 (from the past 24 hours)   POCT GLUCOSE   Result Value Ref Range    POCT Glucose 81 74 - 99 mg/dL   CBC and Auto Differential   Result Value Ref Range    WBC 15.4 (H) 4.4 - 11.3 x10*3/uL    nRBC 0.0 0.0 - 0.0 /100 WBCs    RBC 4.47 4.00 - 5.20 x10*6/uL    Hemoglobin 13.3 12.0 - 16.0 g/dL    Hematocrit 39.9 36.0 - 46.0 %    MCV 89 80 - 100 fL    MCH 29.8 26.0 - 34.0 pg    MCHC 33.3 32.0 - 36.0 g/dL    RDW 14.5 11.5 - 14.5 %    Platelets 240 150 - 450 x10*3/uL    Neutrophils % 65.1 40.0 - 80.0 %    Immature Granulocytes %, Automated 0.4 0.0 - 0.9 %    Lymphocytes % 25.9 13.0 - 44.0 %    Monocytes % 6.7 2.0 - 10.0 %    Eosinophils % 1.4 0.0 - 6.0 %    Basophils % 0.5 0.0 - 2.0 %    Neutrophils Absolute 10.04 (H) 1.20 - 7.70 x10*3/uL    Immature Granulocytes Absolute, Automated 0.06 0.00 - 0.70 x10*3/uL    Lymphocytes Absolute 4.00 1.20 - 4.80 x10*3/uL    Monocytes Absolute 1.03 (H) 0.10 - 1.00 x10*3/uL    Eosinophils Absolute 0.21 0.00 - 0.70 x10*3/uL    Basophils Absolute 0.08 0.00 - 0.10 x10*3/uL   Comprehensive metabolic panel   Result Value Ref Range    Glucose 81 74 - 99 mg/dL    Sodium 137 136 - 145 mmol/L    Potassium 3.8 3.5 - 5.3 mmol/L    Chloride 104 98 - 107 mmol/L    Bicarbonate 25 21 - 32 mmol/L    Anion Gap 12 10 - 20 mmol/L    Urea Nitrogen 27 (H) 6 - 23 mg/dL    Creatinine 1.69 (H) 0.50 - 1.05 mg/dL    eGFR 34 (L) >60  mL/min/1.73m*2    Calcium 9.4 8.6 - 10.3 mg/dL    Albumin 4.3 3.4 - 5.0 g/dL    Alkaline Phosphatase 89 33 - 136 U/L    Total Protein 7.1 6.4 - 8.2 g/dL    AST 17 9 - 39 U/L    Bilirubin, Total 0.5 0.0 - 1.2 mg/dL    ALT 19 7 - 45 U/L   Troponin I, High Sensitivity   Result Value Ref Range    Troponin I, High Sensitivity 4 0 - 13 ng/L   Protime-INR   Result Value Ref Range    Protime 10.9 9.8 - 12.8 seconds    INR 0.9 0.9 - 1.1   APTT   Result Value Ref Range    aPTT 28 27 - 38 seconds        CT brain attack head wo IV contrast    Result Date: 11/11/2024  Interpreted By:  Massiel Wall, STUDY: CT BRAIN ATTACK HEAD WO IV CONTRAST;  11/11/2024 6:11 pm   INDICATION: Signs/Symptoms:Stroke Evaluation.   COMPARISON: CT head 07/10/2016.   ACCESSION NUMBER(S): [id number]   ORDERING CLINICIAN: ANUP CARTAGENA   TECHNIQUE: Noncontrast axial CT scan of head was performed. Multiplanar reconstructions   FINDINGS: No evidence of a large territorial infarct. Gray-white matter interfaces are preserved. No acute intracranial hemorrhage, mass effect, midline shift, or herniation. No evidence of hydrocephalus.   The ventricles and sulci are mildly prominent for age, consistent with mild cerebral volume loss. The visualized paranasal sinuses and mastoid air cells are clear. No acute osseous abnormality of the calvarium. No destructive bone lesion.       No CT evidence of acute intracranial abnormality. Consider follow-up with MRI as warranted.   Dr. Massiel Wall discussed the significance and urgency of this critical finding by epic message with Dr. ANUP CARTAGENA on 11/11/2024 at 6:40 pm.  (**-RCF-**) Findings:  See findings.         Signed by: Massiel Wall 11/11/2024 6:41 PM Dictation workstation:   [id number]       Assessment/Plan     64-year-old female with a past medical history of hyperlipidemia, hypertension, diastolic congestive heart failure, depression, anxiety, obstructive sleep apnea, former nicotine dependence, rib  fractures secondary to a fall, fibromyalgia, cervical vertebral degenerative disease with varying degrees of spinal canal and neural foraminal narrowing s/p discectomies and fusion at C5-T1 levels, hypothyroidism, obesity, GERD, insomnia, and BPPV who presented to the emergency room for headache and difficulty using the right arm.  Pertinent findings on blood workup showed acute kidney injury and leukocytosis.  CT of the head came back grossly within normal limits.    I will admit the patient to observation medical service with telemetry and vital signs monitoring.  I will order carotid Dopplers.  And a brain MRI/MRA.  Patient already on statins at home.  So we will continue home dose Lipitor 80 mg at bedtime.  Continue with aspirin 81 mg oral daily.  Until MRI brain results are back.  Check lipid panel and HbA1c level.  Regarding patient leukocytosis, she was actually finishing a 5-day course of prednisone 40 mg oral daily for the questionable sinusitis/rhinitis.  So for now, I will only monitor levels.  I do not have yet a clear explanation of patient's acute kidney injury.  She was given IV fluids in the ER.  Encourage water drinking.  Repeat BMP in the morning.  Resume home medications  SCDs for DVT prophylaxis  Full code        (This note was generated with voice recognition software and may contain errors including spelling, grammar, syntax and misrecognition of what was dictated, that are not fully corrected)     Griffin Espino MD

## 2024-11-12 NOTE — PROGRESS NOTES
Occupational Therapy    Evaluation    Patient Name: Julia Mcguire  MRN: [id number]  Today's Date: 11/12/2024  Time Calculation  Start Time: 1420  Stop Time: 1444  Time Calculation (min): 24 min  315/315-A    Assessment  IP OT Assessment  OT Assessment: Pt able to perform ADL and mobility independently yet pt having deficits in RUE/hand gross motor coordination and fine motor coordination.  pt provided with a handout for HEP to increase skills.  pt would benefit from outpatient OT services..  Prognosis: Excellent  Barriers to Discharge: None  Evaluation/Treatment Tolerance: Patient tolerated treatment well  Medical Staff Made Aware: Yes  End of Session Communication: PCT/NA/CTA    Plan:  Treatment Interventions: Fine motor coordination activities (GMC)  OT Frequency: 2 times per week  OT Discharge Recommendations: Low intensity level of continued care (outpt OT)  OT Recommended Transfer Status: Independent    Subjective     Current Problem:  1. TIA (transient ischemic attack)  Vascular US carotid artery duplex bilateral    Vascular US carotid artery duplex bilateral      2. Stroke-like symptoms        3. Nonintractable headache, unspecified chronicity pattern, unspecified headache type        4. Renal insufficiency        5. Other specified symptoms and signs involving the circulatory and respiratory systems  Vascular US carotid artery duplex bilateral      6. Occlusion and stenosis of bilateral carotid arteries  Vascular US carotid artery duplex bilateral          General:  General  Reason for Referral: 64 year old female admitted for possible CVA  MRI Brain:      1. Small scattered regions of acute to subacute infarct involving the  posterior left frontal lobe without hemorrhagic transformation or  mass effect.  2. Mild burden of chronic microvascular ischemic disease and  generalized parenchymal volume loss.  Referred By: Yong Espino  Past Medical History Relevant to Rehab: Anxiety      Depression     Disease of  thyroid gland     Hyperlipidemia     Hypertension     Personal history of other diseases of the musculoskeletal system and connective tissue 04/28/2022    History of joint pain   Sleep apnea  Family/Caregiver Present: No  Co-Treatment: PT  Co-Treatment Reason: to maximize pt safety  Patient Position Received: Bed, 3 rail up, Alarm off, not on at start of session  General Comment: pt states she is feeling better and has more control over her RUE.  headache is still lingering    Precautions:  Medical Precautions: Fall precautions    Vital Signs:  Vital Signs Comment: no concerns    Pain:  Pain Assessment  Pain Assessment: 0-10  0-10 (Numeric) Pain Score:  (pt c/o headache.  does not rate pain)    Objective     Cognition:  Overall Cognitive Status: Within Functional Limits  Orientation Level: Oriented X4  Safety/Judgement: Exceptions to WFL  Insight: Mild             Home Living:  Type of Home: House  Lives With: Alone  Home Layout: Laundry in basement, One level  Home Access: Stairs to enter with rails, Stairs to enter without rails (4 DELORIS side of home with no rails.  4 DELORIS front of home with rail)  Bathroom Shower/Tub: Tub/shower unit     Prior Function:  ADL Assistance: Independent  Homemaking Assistance: Independent  Ambulatory Assistance: Independent (no device)    ADL:  Eating Assistance: Independent  Grooming Assistance: Independent  Bathing Assistance: Independent  UE Dressing Assistance: Independent  LE Dressing Assistance: Independent  Toileting Assistance with Device: Independent    Activity Tolerance:  Endurance: Endurance does not limit participation in activity    Bed Mobility/Transfers:   Bed Mobility  Bed Mobility: Yes  Bed Mobility 1  Bed Mobility 1: Supine to sitting  Level of Assistance 1: Independent  Transfers  Transfer: Yes  Transfer 1  Technique 1: Sit to stand, Stand to sit  Transfer Level of Assistance 1: Independent    Ambulation/Gait Training:  Functional Mobility  Functional Mobility  Performed: Yes  Functional Mobility 1  Surface 1: Level tile  Device 1: No device  Assistance 1: Independent    Standing Balance:  Dynamic Standing Balance  Dynamic Standing-Comments: higher level balance pt is unsteady-needs assist- see PT eval    Vision: Vision - Basic Assessment  Current Vision: Other (Comment) (pt reports no new changes)      Sensation:  Stereognosis: No apparent deficits  Proprioception:  (WFL- slightly diminished)  Sensation Comment: pt reports that R hand still feels slightly numb but much improved since yesterday    Strength:  Strength Comments: LUE WNL.    RUE shoulder 4-/5.  elbow 4/5.  wrist and hand 4/5.      Coordination:  Movements are Fluid and Coordinated: No (R hand deficits)  Finger to Nose: Impaired (RUE slower but able to hit target)  Rapid Alternating Movements: Impaired  Finger to Target: Impaired (impaired RUE.   slower reaction time.  unable to hit fast moving target but able to hit stationary or moving target.   Overall functional for simple daily tasks)     Hand Function:  Hand Function  Gross Grasp: Functional      Outcome Measures: [facility] Daily Activity  Putting on and taking off regular lower body clothing: None  Bathing (including washing, rinsing, drying): None  Putting on and taking off regular upper body clothing: None  Toileting, which includes using toilet, bedpan or urinal: None  Taking care of personal grooming such as brushing teeth: None  Eating Meals: None  Daily Activity - Total Score: 24                       EDUCATION:     Education Documentation  Handouts, taught by Savannah Prado OT at 11/12/2024  3:03 PM.  Learner: Patient  Readiness: Acceptance  Method: Explanation, Demonstration  Response: Demonstrated Understanding  Comment: hand out to increase RUE FMC at home    Home Exercise Program, taught by Savannah Prado OT at 11/12/2024  3:03 PM.  Learner: Patient  Readiness: Acceptance  Method: Explanation, Demonstration  Response: Demonstrated  Understanding  Comment: hand out to increase RUE FMC at home    Education Comments  No comments found.        Goals:   Encounter Problems       Encounter Problems (Active)       EXERCISE/STRENGTHENING       pt will return demo HEP for RUE coordination at indep level of assist (Progressing)       Start:  11/12/24    Expected End:  11/26/24

## 2024-11-12 NOTE — NURSING NOTE
"11/12/2024 @ 1137   Patient resting in bed, awake and alert   Oriented x4   Daughter @ bedside, consent received to talk  about healthcare   Speech clear, smile symmetrical   Tongue midline   Taking sips of water without difficulty   Answers questions appropriately, follows commands   Moving all extremities x4   Rt hand grasp slightly weaker than Left   States + numbness to Rt hand   Radial pulses =/strong    Current complaint of headache, 7/10   Denies nausea, vision changes, or dizziness @ present   VS Stable, BP slightly elevated   GCS= 15    NIHSS= 2 for RUE Limb Ataxia, slight and Numbness to Rt hand   Personal Stroke Risk Factors reviewed with patient   #1- HTN  #2- HLD  #3- Obstructive Sleep Apnea, reviewed benefits of C-Pap   #4- Occas smoker, <1/2 PPD (49 years) #5- Family history of Stroke:  Mother & Sister   #6- Unhealthy diet and poor exercise  #7- Migraines   Patient states occasional noncompliance with evening medications   Patient has a Neuro Surgeon established at a  Facility~  Dr. Gilliam    Patient denies recent falls, denies use of walker or cane      Patient came to ED on 11/11/24 via Private Auto with Daughter   Complaint of top/posterior Headache x1 week and RUE weakness/numbness   Last Known Well @ 1630 on 11/11/24   Pt states she was unable to control her Rt arm/hand to  a glass   I asked patient to perform same test and she was able with + slight ataxia, slight difficulty getting cup/straw to mouth, mild right hand tremors noted   Reviewed with patient and daughter the importance of calling 911 with Stroke-like symptoms  \"Time is Brain\" concept     Reviewed and provided personalized Stroke/TIA education and risk factor handouts:  Women and Stroke, HTN and Stroke, High Cholesterol, Lifestyle changes to prevent Stroke  BE FAST education reviewed on recognizing signs/symptoms of Stroke  BE FAST booklet, magnet, and brain squeeze ball given to assist in strengthening Rt hand   Stroke " education documented in EMR   Stroke Program info provided to patient    POC:  Awaiting MRI/A results   Primary Nurse Estrella Campos RN aware of headache and will medicate   Care provided and Stroke Core Measures well documented by RN   Sending request to CPS for Smoking Cessation Education   RYAN Rodriguez RN  Stroke Coordinator, [facility]       11/13/2024 @ 1205   Patient resting in bed, awake and alert   A/O x4   Speech clear, smile symmetrical  Patient aware of MRI/A results from 11/12/24, reassurance and support provided.   Pt voices fear of Left Carotid Artery surgery, reassurance provided   Briefly explained how it will allow better blood flow and nutrients to the brain, along with Stroke/Heart Attack prevention   Patient continues to have intermittent numbness to Rt hand   Rt hand grasp slightly weaker than Left   Radial pulses =/strong   All Fingers pink, warm, and mobile   Patient stated she was able to brush her teeth with her Right hand this morning  Spoke to patient regarding the importance of follow up, medication compliance, and modifying her Stroke risk factors.    RYAN Rodriguez RN  Stroke Coordinator, [facility] [place]

## 2024-11-13 ENCOUNTER — PHARMACY VISIT (OUTPATIENT)
Dept: PHARMACY | Facility: CLINIC | Age: 64
End: 2024-11-13
Payer: MEDICAID

## 2024-11-13 ENCOUNTER — APPOINTMENT (OUTPATIENT)
Dept: CARDIOLOGY | Facility: HOSPITAL | Age: 64
End: 2024-11-13
Payer: COMMERCIAL

## 2024-11-13 VITALS
DIASTOLIC BLOOD PRESSURE: 82 MMHG | RESPIRATION RATE: 16 BRPM | HEART RATE: 71 BPM | HEIGHT: 62 IN | BODY MASS INDEX: 30.22 KG/M2 | OXYGEN SATURATION: 93 % | WEIGHT: 164.24 LBS | SYSTOLIC BLOOD PRESSURE: 120 MMHG | TEMPERATURE: 97.2 F

## 2024-11-13 PROBLEM — R29.90 STROKE-LIKE SYMPTOMS: Status: RESOLVED | Noted: 2024-11-13 | Resolved: 2024-11-13

## 2024-11-13 PROBLEM — R29.90 STROKE-LIKE SYMPTOMS: Status: ACTIVE | Noted: 2024-11-13

## 2024-11-13 LAB
ANION GAP SERPL CALC-SCNC: 10 MMOL/L (ref 10–20)
AORTIC VALVE MEAN GRADIENT: 4 MMHG
AORTIC VALVE PEAK VELOCITY: 1.43 M/S
AV PEAK GRADIENT: 8 MMHG
AVA (PEAK VEL): 1.98 CM2
AVA (VTI): 1.88 CM2
BUN SERPL-MCNC: 24 MG/DL (ref 6–23)
CALCIUM SERPL-MCNC: 9.1 MG/DL (ref 8.6–10.3)
CHLORIDE SERPL-SCNC: 107 MMOL/L (ref 98–107)
CO2 SERPL-SCNC: 25 MMOL/L (ref 21–32)
CREAT SERPL-MCNC: 1.03 MG/DL (ref 0.5–1.05)
EGFRCR SERPLBLD CKD-EPI 2021: 61 ML/MIN/1.73M*2
EJECTION FRACTION APICAL 4 CHAMBER: 62.3
EJECTION FRACTION: 60 %
ERYTHROCYTE [DISTWIDTH] IN BLOOD BY AUTOMATED COUNT: 14.7 % (ref 11.5–14.5)
GLUCOSE SERPL-MCNC: 93 MG/DL (ref 74–99)
HCT VFR BLD AUTO: 42.2 % (ref 36–46)
HGB BLD-MCNC: 13.9 G/DL (ref 12–16)
LEFT ATRIUM VOLUME AREA LENGTH INDEX BSA: 13.5 ML/M2
LEFT VENTRICLE INTERNAL DIMENSION DIASTOLE: 3.62 CM (ref 3.5–6)
LEFT VENTRICULAR OUTFLOW TRACT DIAMETER: 1.8 CM
LV EJECTION FRACTION BIPLANE: 60 %
MCH RBC QN AUTO: 29.7 PG (ref 26–34)
MCHC RBC AUTO-ENTMCNC: 32.9 G/DL (ref 32–36)
MCV RBC AUTO: 90 FL (ref 80–100)
MITRAL VALVE E/A RATIO: 1.11
NRBC BLD-RTO: 0 /100 WBCS (ref 0–0)
PLATELET # BLD AUTO: 228 X10*3/UL (ref 150–450)
POTASSIUM SERPL-SCNC: 4.1 MMOL/L (ref 3.5–5.3)
RBC # BLD AUTO: 4.68 X10*6/UL (ref 4–5.2)
RIGHT VENTRICLE PEAK SYSTOLIC PRESSURE: 31.9 MMHG
SODIUM SERPL-SCNC: 138 MMOL/L (ref 136–145)
TRICUSPID ANNULAR PLANE SYSTOLIC EXCURSION: 1.3 CM
WBC # BLD AUTO: 9.5 X10*3/UL (ref 4.4–11.3)

## 2024-11-13 PROCEDURE — 94761 N-INVAS EAR/PLS OXIMETRY MLT: CPT

## 2024-11-13 PROCEDURE — 2500000001 HC RX 250 WO HCPCS SELF ADMINISTERED DRUGS (ALT 637 FOR MEDICARE OP): Performed by: INTERNAL MEDICINE

## 2024-11-13 PROCEDURE — 9420000001 HC RT PATIENT EDUCATION 5 MIN

## 2024-11-13 PROCEDURE — 2500000004 HC RX 250 GENERAL PHARMACY W/ HCPCS (ALT 636 FOR OP/ED)

## 2024-11-13 PROCEDURE — 2500000001 HC RX 250 WO HCPCS SELF ADMINISTERED DRUGS (ALT 637 FOR MEDICARE OP): Performed by: NURSE PRACTITIONER

## 2024-11-13 PROCEDURE — RXMED WILLOW AMBULATORY MEDICATION CHARGE

## 2024-11-13 PROCEDURE — C8929 TTE W OR WO FOL WCON,DOPPLER: HCPCS

## 2024-11-13 PROCEDURE — 36415 COLL VENOUS BLD VENIPUNCTURE: CPT | Performed by: NURSE PRACTITIONER

## 2024-11-13 PROCEDURE — 99239 HOSP IP/OBS DSCHRG MGMT >30: CPT | Performed by: NURSE PRACTITIONER

## 2024-11-13 PROCEDURE — 2500000005 HC RX 250 GENERAL PHARMACY W/O HCPCS: Performed by: INTERNAL MEDICINE

## 2024-11-13 PROCEDURE — 99254 IP/OBS CNSLTJ NEW/EST MOD 60: CPT

## 2024-11-13 PROCEDURE — G0378 HOSPITAL OBSERVATION PER HR: HCPCS

## 2024-11-13 PROCEDURE — 1200000002 HC GENERAL ROOM WITH TELEMETRY DAILY

## 2024-11-13 PROCEDURE — 80048 BASIC METABOLIC PNL TOTAL CA: CPT | Performed by: NURSE PRACTITIONER

## 2024-11-13 PROCEDURE — 85027 COMPLETE CBC AUTOMATED: CPT | Performed by: NURSE PRACTITIONER

## 2024-11-13 PROCEDURE — 2500000002 HC RX 250 W HCPCS SELF ADMINISTERED DRUGS (ALT 637 FOR MEDICARE OP, ALT 636 FOR OP/ED): Performed by: INTERNAL MEDICINE

## 2024-11-13 PROCEDURE — 93306 TTE W/DOPPLER COMPLETE: CPT | Performed by: STUDENT IN AN ORGANIZED HEALTH CARE EDUCATION/TRAINING PROGRAM

## 2024-11-13 RX ORDER — TRAMADOL HYDROCHLORIDE 50 MG/1
50 TABLET ORAL EVERY 6 HOURS PRN
Status: DISCONTINUED | OUTPATIENT
Start: 2024-11-13 | End: 2024-11-13 | Stop reason: SDUPTHER

## 2024-11-13 RX ORDER — TRAMADOL HYDROCHLORIDE 50 MG/1
50 TABLET ORAL EVERY 6 HOURS PRN
Status: DISCONTINUED | OUTPATIENT
Start: 2024-11-13 | End: 2024-11-13 | Stop reason: HOSPADM

## 2024-11-13 RX ORDER — TRAMADOL HYDROCHLORIDE 50 MG/1
50 TABLET ORAL EVERY 6 HOURS PRN
Qty: 8 TABLET | Refills: 0 | Status: SHIPPED | OUTPATIENT
Start: 2024-11-13 | End: 2024-11-21

## 2024-11-13 SDOH — ECONOMIC STABILITY: FOOD INSECURITY: WITHIN THE PAST 12 MONTHS, YOU WORRIED THAT YOUR FOOD WOULD RUN OUT BEFORE YOU GOT MONEY TO BUY MORE.: SOMETIMES TRUE

## 2024-11-13 SDOH — ECONOMIC STABILITY: TRANSPORTATION INSECURITY: IN THE PAST 12 MONTHS, HAS LACK OF TRANSPORTATION KEPT YOU FROM MEDICAL APPOINTMENTS OR FROM GETTING MEDICATIONS?: NO

## 2024-11-13 SDOH — ECONOMIC STABILITY: FOOD INSECURITY: WITHIN THE PAST 12 MONTHS, THE FOOD YOU BOUGHT JUST DIDN'T LAST AND YOU DIDN'T HAVE MONEY TO GET MORE.: SOMETIMES TRUE

## 2024-11-13 SDOH — ECONOMIC STABILITY: HOUSING INSECURITY

## 2024-11-13 SDOH — ECONOMIC STABILITY: TRANSPORTATION INSECURITY

## 2024-11-13 SDOH — ECONOMIC STABILITY: HOUSING INSECURITY: IN THE LAST 12 MONTHS, HOW MANY PLACES HAVE YOU LIVED?: 1

## 2024-11-13 SDOH — ECONOMIC STABILITY: HOUSING INSECURITY: IN THE PAST 12 MONTHS HAS THE ELECTRIC, GAS, OIL, OR WATER COMPANY THREATENED TO SHUT OFF SERVICES IN YOUR HOME?: NO

## 2024-11-13 SDOH — ECONOMIC STABILITY: GENERAL

## 2024-11-13 SDOH — ECONOMIC STABILITY: HOUSING INSECURITY: IN THE LAST 12 MONTHS, WAS THERE A TIME WHEN YOU WERE NOT ABLE TO PAY THE MORTGAGE OR RENT ON TIME?: NO

## 2024-11-13 SDOH — ECONOMIC STABILITY: INCOME INSECURITY: IN THE LAST 12 MONTHS, WAS THERE A TIME WHEN YOU WERE NOT ABLE TO PAY THE MORTGAGE OR RENT ON TIME?: NO

## 2024-11-13 SDOH — ECONOMIC STABILITY: TRANSPORTATION INSECURITY
IN THE PAST 12 MONTHS, HAS THE LACK OF TRANSPORTATION KEPT YOU FROM MEDICAL APPOINTMENTS OR FROM GETTING MEDICATIONS?: NO

## 2024-11-13 SDOH — ECONOMIC STABILITY: HOUSING INSECURITY
IN THE LAST 12 MONTHS, WAS THERE A TIME WHEN YOU DID NOT HAVE A STEADY PLACE TO SLEEP OR SLEPT IN A SHELTER (INCLUDING NOW)?: NO

## 2024-11-13 SDOH — ECONOMIC STABILITY: TRANSPORTATION INSECURITY
IN THE PAST 12 MONTHS, HAS LACK OF TRANSPORTATION KEPT YOU FROM MEETINGS, WORK, OR FROM GETTING THINGS NEEDED FOR DAILY LIVING?: NO

## 2024-11-13 SDOH — ECONOMIC STABILITY: FOOD INSECURITY
WITHIN THE PAST 12 MONTHS, YOU WORRIED THAT YOUR FOOD WOULD RUN OUT BEFORE YOU GOT THE MONEY TO BUY MORE.: SOMETIMES TRUE

## 2024-11-13 SDOH — ECONOMIC STABILITY: FOOD INSECURITY

## 2024-11-13 ASSESSMENT — COGNITIVE AND FUNCTIONAL STATUS - GENERAL
MOBILITY SCORE: 24
DAILY ACTIVITIY SCORE: 24

## 2024-11-13 ASSESSMENT — PAIN SCALES - GENERAL
PAINLEVEL_OUTOF10: 1
PAINLEVEL_OUTOF10: 7
PAINLEVEL_OUTOF10: 3
PAINLEVEL_OUTOF10: 9
PAINLEVEL_OUTOF10: 4
PAINLEVEL_OUTOF10: 0 - NO PAIN

## 2024-11-13 ASSESSMENT — PAIN - FUNCTIONAL ASSESSMENT
PAIN_FUNCTIONAL_ASSESSMENT: 0-10

## 2024-11-13 ASSESSMENT — PAIN DESCRIPTION - LOCATION
LOCATION: HEAD

## 2024-11-13 ASSESSMENT — ACTIVITIES OF DAILY LIVING (ADL): LACK_OF_TRANSPORTATION: NO

## 2024-11-13 ASSESSMENT — SOCIAL DETERMINANTS OF HEALTH (SDOH): IN THE PAST 12 MONTHS, HAS THE ELECTRIC, GAS, OIL, OR WATER COMPANY THREATENED TO SHUT OFF SERVICE IN YOUR HOME?: NO

## 2024-11-13 NOTE — PROGRESS NOTES
Medication Education     Medication education for Tuesday BISI Mcguire was provided to the patient  for the following medication(s):  Clopidogrel 75 mg daily      Medication education provided by a Pharmacist:  ADR Counseling Dose, frequency, storage Proper dose, indication, possible ADRs How the medication works and benefits of taking it Benefits of taking the medication  Potential duration of therapy    Identified potential barriers to education:  None    Method(s) of Education:  Verbal Written materials provided and reviewed    An opportunity to ask questions and receive answers was provided.     Assessment of understanding the patient :  2= meets goals/outcomes    Additional Notes (if applicable):   Patient participated in the  Meds to Beds program via the [facility]. The above 1 medication was delivered to the patient's bedside by the pharmacist and all question were addressed at that time.   Patient endorsed headache and fear of worsening headache upon discharge. Patient was hospitalized for a stroke and has a history of stomach issues in the past. Recommended Tylenol for headache pain upon discharge.    Chantal Sanchez, PharmD

## 2024-11-13 NOTE — NURSING NOTE
Discharge Note: 11/13/2024 1526 Discharged via wheelchair to private car accompanied by PCT, personal belongings taken with pt, no distress noted, no complaints voiced. Kathe MUNGUIA

## 2024-11-13 NOTE — NURSING NOTE
Discharge Note: 11/13/2024 1430 AVS and pt responsibilities reviewed with pt and copy given. Stroke, TIA, BE FAST, education reviewed with pt and information sheets given. Pt verbalizes understanding of instructions received, verbalizes understanding of when to seek medical attention, denies any home going or personal care needs. Denies further questions or concerns. Reviewed follow up appts with pt and verbalizes understanding. Pt states headache is returning, Estrella Campos RN and Pooja Dickinson CNP notified. Kathe MUNGUIA

## 2024-11-13 NOTE — DISCHARGE SUMMARY
Discharge Diagnosis  TIA (transient ischemic attack)    Issues Requiring Follow-Up  Small scattered regions of acute to subacute infarct involving the posterior left frontal and anterior parietal lobes.  Left ICA >70% stenosis.         Test Results Pending At Discharge  Pending Labs       No current pending labs.            Hospital Course   Tuesday BISI Mcguire is a 64 y.o. female  Who presented to the emergency room for headache and difficulty using the right arm.  On presentation, vital signs grossly within normal limits.  ER provider reported NIH score of 1 with some difficulty with finger-to-nose on the right side.  Blood workup showed WBC 15,400 and creatinine 1.69.  CT scan of the brain did not show any acute findings.  Patient was given in the emergency room 325 mg of aspirin and IV fluids and then admitted to the medical service for further investigation and management.     MRI/MRA revealed Small scattered regions of acute to subacute infarct involving the posterior left frontal and anterior parietal lobes.  Patient was started on aspirin, Plavix and high-dose statin.  Was complaining of occipital headache and right hand numbness and tingling.  Patient states it was somewhat difficult to  her fork or brush her teeth.  PT and OT recommend continued physical therapy interventions at a low intensity improve strength, balance, mobility and reduce fall risk.360 deg mild incoordination and slower towards right compared to left; side-stepping to left and right with slower speed and mildly less coordinated towards R compared to L.  Stroke neurology was consulted.  Recommendation to follow-up outpatient within 2 to 3 weeks.  At time of discharge patient's headache was 1 out of 10.  She reports her right hand still has some very mild numbness.  She states she had no trouble brushing her teeth this morning or using her utensils.    Carotid artery duplex revealed left ICA with greater than 70% stenosis.  Vascular  surgery was consulted.  CTA was done.  The left internal carotid artery has 70% stenosis in the right internal carotid has 30% stenosis.  Echo with bubble study revealed EF of 60%.  Patient is to continue her aspirin and Plavix and follow-up with Dr. Christine next week to discuss carotid [place] [facility]  Dr. Christine will work on scheduling procedure and have this information to the patient next week.   Patient had a planned trip to fly to [place] today and was very upset she was unable to do so.  She has been advised to not fly for the next 2 weeks.  She has also been encouraged to stop smoking.    Patient today is hemodynamically stable and ready for discharge.  The signs symptoms of stroke were reviewed again before discharge.    Pertinent Physical Exam At Time of Discharge  Physical Exam  General: awake, alert and oriented. No acute distress.   Skin: Skin is warm, dry and intact without rashes or lesions.   HEENT: normocephalic, atraumatic; conjunctivae are clear without exudates or hemorrhage. Sclera is non-icteric. Eyelids are normal in appearance without swelling or lesions. Hearing intact. Nares are patent bilaterally. Moist mucous membranes.   Cardiovascular: heart rate and rhythm are normal. No murmurs, gallops, or rubs are auscultated. S1 and S2 are heard and are of normal intensity. No bruits  Respiratory: bilateral lung sounds clear to auscultations without rales, rhonchi, or wheezes. No accessory muscle use or stridor  Musculoskeletal: no deformities  Extremities: no edema, no wounds  Neurological: no focal deficits; gait steady; strength equal bilaterally; no facial asymmetry over patient has the sensation of mild right hand numbness and tingling.  Psychiatric: appropriate mood and affect; good judgment and insight  Outpatient Follow-Up  Future Appointments   Date Time Provider Department Center   11/20/2024  1:40 PM Manisha Singh DO [id number]   11/20/2024  3:30 PM Kimberlee Christine  MD [id number]         Pooja Dickinson, APRN-CNP

## 2024-11-13 NOTE — PROGRESS NOTES
11/13/24 0842   Discharge Planning   Living Arrangements Alone   Support Systems Children   Assistance Needed none   Type of Residence Private residence   Number of Stairs to Enter Residence 4   Number of Stairs Within Residence 0   Do you have animals or pets at home? Yes   Type of Animals or Pets 1 cat   Who is requesting discharge planning? Patient   Home or Post Acute Services None   Expected Discharge Disposition Home   Does the patient need discharge transport arranged? No     Spoke with pt who was very tearful as she was just told she has had a stroke and is unable to fly to [place]. She had a vacation planned with all of her siblings and now she can not fly and she is not being discharged...TCC consoled pt and provided moral support. Then asked if she wanted me to call her daughter and she began crying again because her daughter had went to [place] to surprise the family. TCC asked pt if there was anyone I could call for her to come sit with her, pt responded no and said Im just upset I can not go and may never get a chance to get us all together again. Pt being changed to inpatient status per care rounds. Plan is to discharge home with possible out patient PT. [facility] 24/23. CT to follow. Kaila Jacob BSN/RN-TCC

## 2024-11-13 NOTE — CONSULTS
"Reason For Consult  Tobacco Assessment  Pt. Was seen today for a smoking assessment.   Pt. Has a 25PPY smoking hx.  Pt. Had quit for a long time, but admits to \"picking them up every now and then When she has a drink and things\",   Pt. And I discussed the benefits of quitting smoking along with the long term affects of smoking.   I discussed the freedom from smoking program with the pt. And gave her the handout and contact information.   Pt. Knows she needs to give them up, she just needs to find the will power to give up the last few.            Josie Epstein, RRT    "

## 2024-11-13 NOTE — PROGRESS NOTES
Julia Mcguire is a 64 y.o. female on day 0 of admission presenting with TIA (transient ischemic attack).      Subjective   Patient reports her headache is 1 out of 10 today.  She reports she no longer has numbness in her right hand.  She is very upset because she was supposed to leave on a trip to [place] today.  It is not recommended she fly for 2 weeks.  Vascular also has planned follow-up for her carotid artery disease.       Objective     Last Recorded Vitals  /82 (BP Location: Right arm, Patient Position: Sitting)   Pulse 71   Temp 36.2 °C (97.2 °F) (Temporal)   Resp 16   Wt 74.5 kg (164 lb 3.9 oz)   SpO2 95%   Intake/Output last 3 Shifts:    Intake/Output Summary (Last 24 hours) at 11/13/2024 0950  Last data filed at 11/13/2024 0851  Gross per 24 hour   Intake 720 ml   Output --   Net 720 ml       Admission Weight  Weight: 74.5 kg (164 lb 3.9 oz) (11/11/24 1800)    Daily Weight  11/11/24 : 74.5 kg (164 lb 3.9 oz)    Image Results  CT angio head and neck w and wo IV contrast  Narrative: Interpreted By:  Adonis Quiñones,   STUDY:  CT ANGIO HEAD AND NECK W AND WO IV CONTRAST;  11/12/2024 4:35 pm      INDICATION:  Signs/Symptoms:Left ICA >70% stenosis  HA  Right hand numbness.          COMPARISON:  None.      ACCESSION NUMBER(S):  [id number]      ORDERING CLINICIAN:  TIFFANI LOPEZ      TECHNIQUE:  Unenhanced CT images of the head were obtained. Subsequently, 70 ML  of Omnipaque 350 was administered intravenously and axial images of  the head and neck were acquired.  Coronal, sagittal, and 3-D  reconstructions were provided for review.  Carotid stenoses were  determined using modified NASCET criteria.      FINDINGS:          CTA HEAD FINDINGS:      Anterior circulation: The bilateral intracranial internal carotid  arteries, bilateral carotid terminals, bilateral proximal anterior  and middle cerebral arteries are normal.      Posterior circulation: Bilateral intracranial vertebral  arteries,  vertebrobasilar junction, basilar artery and proximal posterior  cerebral arteries are normal.      CTA NECK FINDINGS:      Right carotid vessels: The common carotid artery is normal. The right  common carotid bifurcation has mixed density plaque extending into  the carotid bulb with 30% stenosis. The plaque is mildly irregular  and small ulcerations are not excluded. The internal carotid artery  in the neck is normal. There is 30% stenosis  .      Left carotid vessels: The common carotid artery is normal. The left  common carotid bifurcation and internal carotid bulb have mixed  density plaque with 70% stenosis over approximately 4 mm length. The  plaque is irregular with small ulceration suggested. The internal  carotid artery in the neck is normal. There is 70% stenosis  .      Vertebral vessels:  The visualized segments of the cervical vertebral  arteries are normal in caliber.      The visualized lungs have increased interstitial markings with no  focal consolidations noted.      Impression: 1. The left internal carotid artery has 70% stenosis in the region of  the bulb with irregular plaque versus small ulcerations in the area  as well.      2. The right internal carotid has 30% stenosis mildly irregular  plaque, ulcerations not excluded.      3. The visualized lungs have increased interstitial markings of  uncertain etiology. No lobar type pneumonic consolidations are noted.      MACRO:  None      Signed by: Adonis Quiñones 11/13/2024 9:36 AM  Dictation workstation:   [id number]      Physical Exam  Constitutional:       General: She is not in acute distress.     Appearance: She is not ill-appearing.      Comments: Awake alert and oriented x3   HENT:      Mouth/Throat:      Pharynx: Oropharynx is clear.   Eyes:      Pupils: Pupils are equal, round, and reactive to light.   Cardiovascular:      Rate and Rhythm: Normal rate and regular rhythm.      Heart sounds: Normal heart sounds.   Pulmonary:       Effort: No respiratory distress.      Breath sounds: Normal breath sounds. No wheezing or rhonchi.   Abdominal:      General: Abdomen is flat. Bowel sounds are normal. There is no distension.      Palpations: Abdomen is soft.      Tenderness: There is no abdominal tenderness.   Musculoskeletal:         General: No swelling.   Skin:     General: Skin is warm.   Neurological:      General: No focal deficit present.      Cranial Nerves: No cranial nerve deficit.      Sensory: No sensory deficit.      Motor: No weakness.      Coordination: Coordination normal.      Deep Tendon Reflexes: Reflexes normal.   Psychiatric:         Mood and Affect: Mood normal.         Behavior: Behavior normal.         Thought Content: Thought content normal.         Judgment: Judgment normal.   Relevant Results       Results for orders placed or performed during the hospital encounter of 11/11/24 (from the past 24 hours)   Basic Metabolic Panel   Result Value Ref Range    Glucose 93 74 - 99 mg/dL    Sodium 138 136 - 145 mmol/L    Potassium 4.1 3.5 - 5.3 mmol/L    Chloride 107 98 - 107 mmol/L    Bicarbonate 25 21 - 32 mmol/L    Anion Gap 10 10 - 20 mmol/L    Urea Nitrogen 24 (H) 6 - 23 mg/dL    Creatinine 1.03 0.50 - 1.05 mg/dL    eGFR 61 >60 mL/min/1.73m*2    Calcium 9.1 8.6 - 10.3 mg/dL   CBC   Result Value Ref Range    WBC 9.5 4.4 - 11.3 x10*3/uL    nRBC 0.0 0.0 - 0.0 /100 WBCs    RBC 4.68 4.00 - 5.20 x10*6/uL    Hemoglobin 13.9 12.0 - 16.0 g/dL    Hematocrit 42.2 36.0 - 46.0 %    MCV 90 80 - 100 fL    MCH 29.7 26.0 - 34.0 pg    MCHC 32.9 32.0 - 36.0 g/dL    RDW 14.7 (H) 11.5 - 14.5 %    Platelets 228 150 - 450 x10*3/uL   Transthoracic Echo (TTE) Complete   Result Value Ref Range    BSA 1.81 m2     *Note: Due to a large number of results and/or encounters for the requested time period, some results have not been displayed. A complete set of results can be found in Results Review.         Assessment/Plan        Tuesday BISI Mcguire is a 64  y.o. female Who presented to the emergency room for headache and difficulty using the right arm. On presentation, vital signs grossly within normal limits. ER provider reported NIH score of 1 with some difficulty with finger-to-nose on the right side. Blood workup showed WBC 15,400 and creatinine 1.69. CT scan of the brain did not show any acute findings. Patient was given in the emergency room 325 mg of aspirin and IV fluids and then admitted to the medical service for further investigation and managemen           Assessment & Plan      Acute to subacute infarct involving the posterior left frontal and anterior parietal lobes  -Aspirin 81 mg, start Plavix 75 mg for 21 days and continue high-dose statin  -Discussed case with teleneurology  -NIHSS 0  -A1c 5.8, LDL 91, triglycerides 211 HDL 53  -Continue stroke protocol  -Follow-up outpatient with stroke neurology  -PT OT consulted and recommend outpatient physical therapy     Lateral carotid stenosis  -Dr. Emmett Saxena consulted and recommended CTA  -- R ICA 50-69% stenosis and Left ICA > 70% stenosis   -CTA left internal carotid artery has 70% stenosis in the region of the bulb with irregular plaque versus small ulceration right internal carotid has 30% stenosis mildly irregular plaque.  -SISI with bubble study pending  -Follow-up outpatient     Hypertension  -Will allow some permissive hypertension however blood pressure is greater than 190 systolic  -Will continue Cozaar 50 mg and give as needed hydralazine if systolic greater than 160.  -Blood pressure much improved today 120/82      Hyperlipidemia  -Controlled by PCP     Hypothyroidism  -Continue levothyroxine      Mild BRIELLE on admission  -Creatinine 1.69 GFR 34 today creatinine 1.03 GFR 61.     DVT prophylaxis Lovenox and SCDs  Disposition Home tomorrow if patient remains medically stable    Pooja Dickinson, APRN-CNP

## 2024-11-13 NOTE — CONSULTS
Inpatient consult to [facility]  Consult performed by: Augustina Soto, GASTON-CNP, DNP  Consult ordered by: Pooja Dickinson, WENDY        History Of Present Illness:    Julia Mcguire is a 64 y.o. female presenting to [facility] ED in the setting of headache and difficulty using her right arm. Per ER report, patient was in her usual state of health and noticed progressively worsening weakness in the right hip, occipital headaches, and not being able to hold anything up. She is prescribed ASA 81mg daily, however, does not take it routinely. VS grossly within normal on presentation. NIH score of 1 with some difficulty with finger-to-nose on the right side. Pertinent labs include WBC 15,000 and creatinine of 1.69. CT of the brain did not show any acute findings. Patient was given ASA 325mg of aspirin and admitted. Once admitted, carotid dopplers and a brain MRI/MRA was ordered which were abnormal. Her carotid doppler was showing >70% stenosis of the left ICA and 50-69% stenosis of the right ICA. MRI of the brain showing acute to subacute infarct involving the posterior left frontal and anterior parietal lobes. Case was discussed with tele neurology who advised initiation of plavix 75mg and outpatient neurology appointment. Endovascular consulted secondary to TIA and abnormal carotid doppler. CTA head and neck was ordered for further evaluation of carotid disease and did confirm severe stenosis of the left ICA.     Patient sitting up in her chair. We reviewed test results. Patient was previously due to fly to [place] today to see her siblings. I do not advise flying at this time due to she just had a stroke and severe carotid disease. Patient then became very tearful and did not understand why despite education. Patient reports she feels she is back to normal state of health and denies any blurry vision, dysarthria, weakness, numbness/tingling, or gait ataxia.      Last Recorded  Vitals:  Vitals:    11/13/24 0000 11/13/24 0400 11/13/24 0603 11/13/24 0715   BP: 114/72 101/68  120/82   BP Location: Right arm Right arm  Right arm   Patient Position: Lying Lying  Sitting   Pulse: 61 73  71   Resp: 16 16     Temp: 36.2 °C (97.2 °F) 36.2 °C (97.2 °F)  36.2 °C (97.2 °F)   TempSrc: Temporal Temporal  Temporal   SpO2: 94% 97% 94% 95%   Weight:       Height:           Last Labs:  CBC - 11/13/2024:  5:41 AM  9.5 13.9 228    42.2      CMP - 11/13/2024:  5:41 AM  9.1 7.1 17 --- 0.5   _ 4.3 19 89      PTT - 11/11/2024:  6:18 PM  0.9   10.9 28     Troponin I, High Sensitivity   Date/Time Value Ref Range Status   11/11/2024 06:18 PM 4 0 - 13 ng/L Final     Troponin I   Date/Time Value Ref Range Status   04/26/2023 01:57 PM 7 0 - 13 ng/L Final     Comment:     .  Less than 99th percentile of normal range cutoff-  Female and children under 18 years old <14 ng/L; Male <21 ng/L: Negative  Repeat testing should be performed if clinically indicated.   .  Female and children under 18 years old 14-50 ng/L; Male 21-50 ng/L:  Consistent with possible cardiac damage and possible increased clinical   risk. Serial measurements may help to assess extent of myocardial damage.   .  >50 ng/L: Consistent with cardiac damage, increased clinical risk and  myocardial infarction. Serial measurements may help assess extent of   myocardial damage.   .   NOTE: Children less than 1 year old may have higher baseline troponin   levels and results should be interpreted in conjunction with the overall   clinical context.   .  NOTE: Troponin I testing is performed using a different   testing methodology at [facility] than at other   [facility] hospitals. Direct result comparisons should only   be made within the same method.     04/26/2023 12:57 PM 7 0 - 13 ng/L Final     Comment:     .  Less than 99th percentile of normal range cutoff-  Female and children under 18 years old <14 ng/L; Male <21 ng/L: Negative  Repeat testing  should be performed if clinically indicated.   .  Female and children under 18 years old 14-50 ng/L; Male 21-50 ng/L:  Consistent with possible cardiac damage and possible increased clinical   risk. Serial measurements may help to assess extent of myocardial damage.   .  >50 ng/L: Consistent with cardiac damage, increased clinical risk and  myocardial infarction. Serial measurements may help assess extent of   myocardial damage.   .   NOTE: Children less than 1 year old may have higher baseline troponin   levels and results should be interpreted in conjunction with the overall   clinical context.   .  NOTE: Troponin I testing is performed using a different   testing methodology at [facility] than at other   [facility] hospitals. Direct result comparisons should only   be made within the same method.     11/18/2022 12:30 PM 4 0 - 13 ng/L Final     Comment:     .  Less than 99th percentile of normal range cutoff-  Female and children under 18 years old <14 ng/L; Male <21 ng/L: Negative  Repeat testing should be performed if clinically indicated.   .  Female and children under 18 years old 14-50 ng/L; Male 21-50 ng/L:  Consistent with possible cardiac damage and possible increased clinical   risk. Serial measurements may help to assess extent of myocardial damage.   .  >50 ng/L: Consistent with cardiac damage, increased clinical risk and  myocardial infarction. Serial measurements may help assess extent of   myocardial damage.   .   NOTE: Children less than 1 year old may have higher baseline troponin   levels and results should be interpreted in conjunction with the overall   clinical context.   .  NOTE: Troponin I testing is performed using a different   testing methodology at [facility] than at other   [facility]. Direct result comparisons should only   be made within the same method.       BNP   Date/Time Value Ref Range Status   04/26/2023 12:57 PM 62 0 - 99 pg/mL Final     Comment:  "    .  <100 pg/mL - Heart failure unlikely  100-299 pg/mL - Intermediate probability of acute heart  .               failure exacerbation. Correlate with clinical  .               context and patient history.    >=300 pg/mL - Heart Failure likely. Correlate with clinical  .               context and patient history.  BNP testing is performed using different testing   methodology at [facility] than at other   [facility]. Direct result comparisons should   only be made within the same method.       Hemoglobin A1C   Date/Time Value Ref Range Status   11/12/2024 05:59 AM 5.8 (H) See comment % Final     LDL Calculated   Date/Time Value Ref Range Status   11/12/2024 05:59 AM 91 <=99 mg/dL Final     Comment:                                 Near   Borderline      AGE      Desirable  Optimal    High     High     Very High     0-19 Y     0 - 109     ---    110-129   >/= 130     ----    20-24 Y     0 - 119     ---    120-159   >/= 160     ----      >24 Y     0 -  99   100-129  130-159   160-189     >/=190       VLDL   Date/Time Value Ref Range Status   11/12/2024 05:59 AM 42 (H) 0 - 40 mg/dL Final   07/07/2021 09:30 AM 31 0 - 40 mg/dL Final   11/20/2019 05:32 AM 31 0 - 40 mg/dL Final      Last I/O:  I/O last 3 completed shifts:  In: 946.7 (12.7 mL/kg) [P.O.:480; IV Piggyback:466.7]  Out: - (0 mL/kg)   Weight: 74.5 kg     Past Cardiology Tests (Last 3 Years):  EKG:  ECG 12 lead  (Preliminary)      ECG 12 Lead 05/26/2024    Echo:  No results found for this or any previous visit from the past 1095 days.    Ejection Fractions:  No results found for: \"EF\"  Cath:  No results found for this or any previous visit from the past 1095 days.    Stress Test:  No results found for this or any previous visit from the past 1095 days.    Cardiac Imaging:  No results found for this or any previous visit from the past 1095 days.      Past Medical History:  She has a past medical history of Anxiety, Depression, Disease of " thyroid gland, Hyperlipidemia, Hypertension, Personal history of other diseases of the musculoskeletal system and connective tissue (04/28/2022), and Sleep apnea.    Past Surgical History:  She has a past surgical history that includes Other surgical history (11/27/2019); Other surgical history (11/27/2019); Other surgical history (11/27/2019); Other surgical history (11/27/2019); Other surgical history (11/27/2019); Other surgical history (11/27/2019); Other surgical history (12/01/2019); Other surgical history (03/10/2020); Cardiac catheterization; and Cervical spine surgery.      Social History:  She reports that she has quit smoking. Her smoking use included cigarettes. She has been exposed to tobacco smoke. She has never used smokeless tobacco. She reports current alcohol use. She reports that she does not use drugs.    Family History:  Family History   Problem Relation Name Age of Onset    Other (Bilateral carotid artery stenosis) Mother      Colonic polyp Mother      Thyroid disease Mother      Cervical cancer Mother          Primary    Other (cva) Mother      Osteoporosis Mother      Emphysema Father      Stroke Sister      Colonic polyp Sister      Diabetes Sister      Hypertension Sister      Heart attack Sister      Heart attack Sister      Other (Cardiac disorder) Half-Brother      Other (cardiac disorder) Other grandmother     Lung cancer Other grandmother     Throat cancer Other grandfather         Allergies:  Codeine, Etodolac, Naproxen, and Sulfamethoxazole    Inpatient Medications:  Scheduled medications   Medication Dose Route Frequency    aspirin  81 mg oral Daily    atorvastatin  80 mg oral Nightly    buPROPion XL  150 mg oral q AM    clopidogrel  75 mg oral Daily    influenza  0.5 mL intramuscular During hospitalization    FLUoxetine  40 mg oral Daily    gabapentin  300 mg oral TID    levothyroxine  88 mcg oral Daily    losartan  50 mg oral Daily     PRN medications   Medication    acetaminophen     hydrALAZINE    HYDROcodone-acetaminophen    labetaloL    oxygen     Continuous Medications   Medication Dose Last Rate     Outpatient Medications:  Current Outpatient Medications   Medication Instructions    aspirin 81 mg EC tablet 1 tablet, Daily    atorvastatin (Lipitor) 80 mg tablet TAKE 1 TABLET BY MOUTH EVERY DAY AT BEDTIME    buPROPion XL (WELLBUTRIN XL) 150 mg, oral, Every morning, Do not crush, chew, or split.    cetirizine (ZYRTEC) 10 mg, oral, Daily    clopidogrel (PLAVIX) 75 mg, oral, Daily    FLUoxetine (PROZAC) 40 mg, oral, Daily    fluticasone (Flonase) 50 mcg/actuation nasal spray 1 spray, Each Nostril, Daily, Shake gently. Before first use, prime pump. After use, clean tip and replace cap.    gabapentin (NEURONTIN) 300 mg, oral, 3 times daily    levothyroxine (SYNTHROID, LEVOXYL) 88 mcg, oral, Daily    losartan (COZAAR) 50 mg, oral, Daily, May take second tab if bp > 140/90    tiZANidine (ZANAFLEX) 4 mg, oral, Every 6 hours PRN       Physical Exam:  General: awake, alert and oriented. No acute distress.   Skin: Skin is warm, dry and intact without rashes or lesions.   HEENT: normocephalic, atraumatic; conjunctivae are clear without exudates or hemorrhage. Sclera is non-icteric. Eyelids are normal in appearance without swelling or lesions. Hearing intact. Nares are patent bilaterally. Moist mucous membranes.   Cardiovascular: heart rate and rhythm are normal. No murmurs, gallops, or rubs are auscultated. S1 and S2 are heard and are of normal intensity. No bruits  Respiratory: bilateral lung sounds clear to auscultations without rales, rhonchi, or wheezes. No accessory muscle use or stridor  Musculoskeletal: no deformities  Extremities: no edema, no wounds  Neurological: no focal deficits; gait steady; strength equal bilaterally; no facial asymmetry  Psychiatric: appropriate mood and affect; good judgment and insight    Assessment/Plan     TIA  CVA  Symptomatic carotid artery stenosis,  left  -Reviewed imaging with patient which confirms severe stenosis of the left ICA. Current NIH score is 0  -Will order echo with bubble study as part of stroke work up  -Continue ASA 81mg daily, atorvastatin 80mg daily, and plavix 75mg daily  -Patient will need scheduled with outpatient Neurology  -Will set up patient to see Dr. Christine in the office next week to discuss carotid intervention at [facility]; patient agreeable to appointment. Dr. Christine will work on scheduling procedure and have this information to patient next week  -Patient strongly advised against flying at this time  -Patient highly encouraged to stop smoking    Plan reviewed and discussed with Dr. Christine   Peripheral IV 11/11/24 20 G Right Hand (Active)   Site Assessment Clean;Dry;Intact 11/13/24 0833   Dressing Status Occlusive;Clean;Dry 11/13/24 0833   Number of days: 2       Code Status:  Full Code    Thank you for allowing me to participate in the care of this patient. Please reach me out if you have any questions or if you need any clarifications regarding the patient's care.          Augustina Soto, APRN-CNP, DNP

## 2024-11-13 NOTE — CARE PLAN
The patient's goals for the shift include      The clinical goals for the shift include safety, Neuro scale of 0      Problem: Pain - Adult  Goal: Verbalizes/displays adequate comfort level or baseline comfort level  Outcome: Progressing     Problem: Safety - Adult  Goal: Free from fall injury  Outcome: Progressing     Problem: Discharge Planning  Goal: Discharge to home or other facility with appropriate resources  Outcome: Progressing     Problem: Chronic Conditions and Co-morbidities  Goal: Patient's chronic conditions and co-morbidity symptoms are monitored and maintained or improved  Outcome: Progressing     Problem: Pain  Goal: Turns in bed with improved pain control throughout the shift  Outcome: Progressing  Goal: Walks with improved pain control throughout the shift  Outcome: Progressing  Goal: Performs ADL's with improved pain control throughout shift  Outcome: Progressing  Goal: Free from opioid side effects throughout the shift  Outcome: Progressing  Goal: Free from acute confusion related to pain meds throughout the shift  Outcome: Progressing

## 2024-11-14 ENCOUNTER — APPOINTMENT (OUTPATIENT)
Dept: CARDIOLOGY | Facility: HOSPITAL | Age: 64
End: 2024-11-14
Payer: COMMERCIAL

## 2024-11-14 ENCOUNTER — APPOINTMENT (OUTPATIENT)
Dept: RADIOLOGY | Facility: HOSPITAL | Age: 64
End: 2024-11-14
Payer: COMMERCIAL

## 2024-11-14 ENCOUNTER — HOSPITAL ENCOUNTER (OUTPATIENT)
Facility: HOSPITAL | Age: 64
Setting detail: OBSERVATION
Discharge: HOME | End: 2024-11-16
Attending: EMERGENCY MEDICINE | Admitting: HOSPITALIST
Payer: COMMERCIAL

## 2024-11-14 DIAGNOSIS — I16.0 HYPERTENSIVE URGENCY: Primary | ICD-10-CM

## 2024-11-14 DIAGNOSIS — I10 HTN (HYPERTENSION), BENIGN: ICD-10-CM

## 2024-11-14 LAB
ANION GAP SERPL CALC-SCNC: 10 MMOL/L (ref 10–20)
BASOPHILS # BLD AUTO: 0.05 X10*3/UL (ref 0–0.1)
BASOPHILS NFR BLD AUTO: 0.5 %
BUN SERPL-MCNC: 25 MG/DL (ref 6–23)
CALCIUM SERPL-MCNC: 9.6 MG/DL (ref 8.6–10.3)
CARDIAC TROPONIN I PNL SERPL HS: 3 NG/L (ref 0–13)
CARDIAC TROPONIN I PNL SERPL HS: 4 NG/L (ref 0–13)
CHLORIDE SERPL-SCNC: 102 MMOL/L (ref 98–107)
CO2 SERPL-SCNC: 26 MMOL/L (ref 21–32)
CREAT SERPL-MCNC: 1.16 MG/DL (ref 0.5–1.05)
EGFRCR SERPLBLD CKD-EPI 2021: 53 ML/MIN/1.73M*2
EOSINOPHIL # BLD AUTO: 0.14 X10*3/UL (ref 0–0.7)
EOSINOPHIL NFR BLD AUTO: 1.4 %
ERYTHROCYTE [DISTWIDTH] IN BLOOD BY AUTOMATED COUNT: 14.6 % (ref 11.5–14.5)
GLUCOSE SERPL-MCNC: 89 MG/DL (ref 74–99)
HCT VFR BLD AUTO: 43.1 % (ref 36–46)
HGB BLD-MCNC: 14 G/DL (ref 12–16)
IMM GRANULOCYTES # BLD AUTO: 0.02 X10*3/UL (ref 0–0.7)
IMM GRANULOCYTES NFR BLD AUTO: 0.2 % (ref 0–0.9)
LYMPHOCYTES # BLD AUTO: 1.86 X10*3/UL (ref 1.2–4.8)
LYMPHOCYTES NFR BLD AUTO: 18.9 %
MAGNESIUM SERPL-MCNC: 1.89 MG/DL (ref 1.6–2.4)
MCH RBC QN AUTO: 29.4 PG (ref 26–34)
MCHC RBC AUTO-ENTMCNC: 32.5 G/DL (ref 32–36)
MCV RBC AUTO: 91 FL (ref 80–100)
MONOCYTES # BLD AUTO: 0.73 X10*3/UL (ref 0.1–1)
MONOCYTES NFR BLD AUTO: 7.4 %
NEUTROPHILS # BLD AUTO: 7.04 X10*3/UL (ref 1.2–7.7)
NEUTROPHILS NFR BLD AUTO: 71.6 %
NRBC BLD-RTO: 0 /100 WBCS (ref 0–0)
PLATELET # BLD AUTO: 230 X10*3/UL (ref 150–450)
POTASSIUM SERPL-SCNC: 4.3 MMOL/L (ref 3.5–5.3)
RBC # BLD AUTO: 4.76 X10*6/UL (ref 4–5.2)
SODIUM SERPL-SCNC: 134 MMOL/L (ref 136–145)
WBC # BLD AUTO: 9.8 X10*3/UL (ref 4.4–11.3)

## 2024-11-14 PROCEDURE — 99285 EMERGENCY DEPT VISIT HI MDM: CPT | Mod: 25

## 2024-11-14 PROCEDURE — 2500000001 HC RX 250 WO HCPCS SELF ADMINISTERED DRUGS (ALT 637 FOR MEDICARE OP): Performed by: HOSPITALIST

## 2024-11-14 PROCEDURE — 36415 COLL VENOUS BLD VENIPUNCTURE: CPT | Performed by: EMERGENCY MEDICINE

## 2024-11-14 PROCEDURE — 93005 ELECTROCARDIOGRAM TRACING: CPT

## 2024-11-14 PROCEDURE — 70450 CT HEAD/BRAIN W/O DYE: CPT | Performed by: RADIOLOGY

## 2024-11-14 PROCEDURE — 83735 ASSAY OF MAGNESIUM: CPT | Performed by: EMERGENCY MEDICINE

## 2024-11-14 PROCEDURE — G0378 HOSPITAL OBSERVATION PER HR: HCPCS

## 2024-11-14 PROCEDURE — 85025 COMPLETE CBC W/AUTO DIFF WBC: CPT | Performed by: EMERGENCY MEDICINE

## 2024-11-14 PROCEDURE — 70450 CT HEAD/BRAIN W/O DYE: CPT

## 2024-11-14 PROCEDURE — 84484 ASSAY OF TROPONIN QUANT: CPT | Performed by: EMERGENCY MEDICINE

## 2024-11-14 PROCEDURE — 96374 THER/PROPH/DIAG INJ IV PUSH: CPT | Mod: 59

## 2024-11-14 PROCEDURE — 96375 TX/PRO/DX INJ NEW DRUG ADDON: CPT | Mod: 59

## 2024-11-14 PROCEDURE — 2500000004 HC RX 250 GENERAL PHARMACY W/ HCPCS (ALT 636 FOR OP/ED): Performed by: EMERGENCY MEDICINE

## 2024-11-14 PROCEDURE — 71045 X-RAY EXAM CHEST 1 VIEW: CPT | Performed by: RADIOLOGY

## 2024-11-14 PROCEDURE — 71045 X-RAY EXAM CHEST 1 VIEW: CPT

## 2024-11-14 PROCEDURE — 71275 CT ANGIOGRAPHY CHEST: CPT

## 2024-11-14 PROCEDURE — 80048 BASIC METABOLIC PNL TOTAL CA: CPT | Performed by: EMERGENCY MEDICINE

## 2024-11-14 PROCEDURE — 99223 1ST HOSP IP/OBS HIGH 75: CPT | Performed by: HOSPITALIST

## 2024-11-14 PROCEDURE — 2550000001 HC RX 255 CONTRASTS: Performed by: EMERGENCY MEDICINE

## 2024-11-14 RX ORDER — FAMOTIDINE 20 MG/1
20 TABLET, FILM COATED ORAL DAILY
Status: DISCONTINUED | OUTPATIENT
Start: 2024-11-14 | End: 2024-11-16 | Stop reason: HOSPADM

## 2024-11-14 RX ORDER — ISOSORBIDE MONONITRATE 30 MG/1
30 TABLET, EXTENDED RELEASE ORAL DAILY
Status: DISCONTINUED | OUTPATIENT
Start: 2024-11-14 | End: 2024-11-16 | Stop reason: HOSPADM

## 2024-11-14 RX ORDER — BUPROPION HYDROCHLORIDE 150 MG/1
150 TABLET ORAL EVERY MORNING
Status: DISCONTINUED | OUTPATIENT
Start: 2024-11-15 | End: 2024-11-16 | Stop reason: HOSPADM

## 2024-11-14 RX ORDER — ASPIRIN 81 MG/1
81 TABLET ORAL NIGHTLY
Status: DISCONTINUED | OUTPATIENT
Start: 2024-11-14 | End: 2024-11-16 | Stop reason: HOSPADM

## 2024-11-14 RX ORDER — ACETAMINOPHEN 325 MG/1
650 TABLET ORAL EVERY 4 HOURS PRN
Status: DISCONTINUED | OUTPATIENT
Start: 2024-11-14 | End: 2024-11-16 | Stop reason: HOSPADM

## 2024-11-14 RX ORDER — LABETALOL HYDROCHLORIDE 5 MG/ML
20 INJECTION, SOLUTION INTRAVENOUS ONCE
Status: COMPLETED | OUTPATIENT
Start: 2024-11-14 | End: 2024-11-14

## 2024-11-14 RX ORDER — ONDANSETRON HYDROCHLORIDE 2 MG/ML
4 INJECTION, SOLUTION INTRAVENOUS ONCE
Status: COMPLETED | OUTPATIENT
Start: 2024-11-14 | End: 2024-11-14

## 2024-11-14 RX ORDER — ACETAMINOPHEN 160 MG/5ML
650 SOLUTION ORAL EVERY 4 HOURS PRN
Status: DISCONTINUED | OUTPATIENT
Start: 2024-11-14 | End: 2024-11-16 | Stop reason: HOSPADM

## 2024-11-14 RX ORDER — OXYCODONE HYDROCHLORIDE 5 MG/1
5 TABLET ORAL EVERY 6 HOURS PRN
Status: DISCONTINUED | OUTPATIENT
Start: 2024-11-15 | End: 2024-11-16 | Stop reason: HOSPADM

## 2024-11-14 RX ORDER — LEVOTHYROXINE SODIUM 88 UG/1
88 TABLET ORAL DAILY
Status: DISCONTINUED | OUTPATIENT
Start: 2024-11-15 | End: 2024-11-16 | Stop reason: HOSPADM

## 2024-11-14 RX ORDER — ONDANSETRON HYDROCHLORIDE 2 MG/ML
4 INJECTION, SOLUTION INTRAVENOUS EVERY 8 HOURS PRN
Status: DISCONTINUED | OUTPATIENT
Start: 2024-11-14 | End: 2024-11-16 | Stop reason: HOSPADM

## 2024-11-14 RX ORDER — ONDANSETRON 4 MG/1
4 TABLET, ORALLY DISINTEGRATING ORAL EVERY 8 HOURS PRN
Status: DISCONTINUED | OUTPATIENT
Start: 2024-11-14 | End: 2024-11-16 | Stop reason: HOSPADM

## 2024-11-14 RX ORDER — OXYCODONE HYDROCHLORIDE 5 MG/1
5 TABLET ORAL ONCE
Status: COMPLETED | OUTPATIENT
Start: 2024-11-14 | End: 2024-11-14

## 2024-11-14 RX ORDER — ACETAMINOPHEN 650 MG/1
650 SUPPOSITORY RECTAL EVERY 4 HOURS PRN
Status: DISCONTINUED | OUTPATIENT
Start: 2024-11-14 | End: 2024-11-16 | Stop reason: HOSPADM

## 2024-11-14 RX ORDER — ENOXAPARIN SODIUM 100 MG/ML
40 INJECTION SUBCUTANEOUS EVERY 24 HOURS
Status: DISCONTINUED | OUTPATIENT
Start: 2024-11-14 | End: 2024-11-16 | Stop reason: HOSPADM

## 2024-11-14 RX ORDER — FLUOXETINE HYDROCHLORIDE 20 MG/1
40 CAPSULE ORAL DAILY
Status: DISCONTINUED | OUTPATIENT
Start: 2024-11-14 | End: 2024-11-16 | Stop reason: HOSPADM

## 2024-11-14 RX ORDER — POLYETHYLENE GLYCOL 3350 17 G/17G
17 POWDER, FOR SOLUTION ORAL DAILY
Status: DISCONTINUED | OUTPATIENT
Start: 2024-11-14 | End: 2024-11-16 | Stop reason: HOSPADM

## 2024-11-14 RX ORDER — ATORVASTATIN CALCIUM 80 MG/1
80 TABLET, FILM COATED ORAL NIGHTLY
Status: DISCONTINUED | OUTPATIENT
Start: 2024-11-14 | End: 2024-11-16 | Stop reason: HOSPADM

## 2024-11-14 RX ORDER — LOSARTAN POTASSIUM 50 MG/1
50 TABLET ORAL DAILY
Status: DISCONTINUED | OUTPATIENT
Start: 2024-11-15 | End: 2024-11-14

## 2024-11-14 RX ORDER — TRAMADOL HYDROCHLORIDE 50 MG/1
50 TABLET ORAL EVERY 6 HOURS PRN
Status: DISCONTINUED | OUTPATIENT
Start: 2024-11-14 | End: 2024-11-14

## 2024-11-14 RX ORDER — ACETAMINOPHEN 500 MG
500 TABLET ORAL EVERY 6 HOURS PRN
COMMUNITY

## 2024-11-14 RX ORDER — GABAPENTIN 300 MG/1
300 CAPSULE ORAL 3 TIMES DAILY
Status: DISCONTINUED | OUTPATIENT
Start: 2024-11-14 | End: 2024-11-16 | Stop reason: HOSPADM

## 2024-11-14 RX ORDER — CLOPIDOGREL BISULFATE 75 MG/1
75 TABLET ORAL DAILY
Status: DISCONTINUED | OUTPATIENT
Start: 2024-11-15 | End: 2024-11-16 | Stop reason: HOSPADM

## 2024-11-14 RX ORDER — LOSARTAN POTASSIUM 50 MG/1
50 TABLET ORAL DAILY
Status: DISCONTINUED | OUTPATIENT
Start: 2024-11-15 | End: 2024-11-16 | Stop reason: HOSPADM

## 2024-11-14 RX ORDER — LABETALOL HYDROCHLORIDE 5 MG/ML
20 INJECTION, SOLUTION INTRAVENOUS EVERY 6 HOURS PRN
Status: DISCONTINUED | OUTPATIENT
Start: 2024-11-14 | End: 2024-11-16 | Stop reason: HOSPADM

## 2024-11-14 SDOH — ECONOMIC STABILITY: HOUSING INSECURITY: IN THE PAST 12 MONTHS, HOW MANY TIMES HAVE YOU MOVED WHERE YOU WERE LIVING?: 0

## 2024-11-14 SDOH — ECONOMIC STABILITY: HOUSING INSECURITY: AT ANY TIME IN THE PAST 12 MONTHS, WERE YOU HOMELESS OR LIVING IN A SHELTER (INCLUDING NOW)?: NO

## 2024-11-14 SDOH — SOCIAL STABILITY: SOCIAL INSECURITY: DO YOU FEEL UNSAFE GOING BACK TO THE PLACE WHERE YOU ARE LIVING?: NO

## 2024-11-14 SDOH — SOCIAL STABILITY: SOCIAL INSECURITY: DOES ANYONE TRY TO KEEP YOU FROM HAVING/CONTACTING OTHER FRIENDS OR DOING THINGS OUTSIDE YOUR HOME?: NO

## 2024-11-14 SDOH — SOCIAL STABILITY: SOCIAL INSECURITY: WITHIN THE LAST YEAR, HAVE YOU BEEN AFRAID OF YOUR PARTNER OR EX-PARTNER?: NO

## 2024-11-14 SDOH — SOCIAL STABILITY: SOCIAL INSECURITY: HAVE YOU HAD THOUGHTS OF HARMING ANYONE ELSE?: NO

## 2024-11-14 SDOH — SOCIAL STABILITY: SOCIAL INSECURITY: HAS ANYONE EVER THREATENED TO HURT YOUR FAMILY OR YOUR PETS?: NO

## 2024-11-14 SDOH — SOCIAL STABILITY: SOCIAL INSECURITY: ARE THERE ANY APPARENT SIGNS OF INJURIES/BEHAVIORS THAT COULD BE RELATED TO ABUSE/NEGLECT?: NO

## 2024-11-14 SDOH — SOCIAL STABILITY: SOCIAL INSECURITY: WITHIN THE LAST YEAR, HAVE YOU BEEN HUMILIATED OR EMOTIONALLY ABUSED IN OTHER WAYS BY YOUR PARTNER OR EX-PARTNER?: NO

## 2024-11-14 SDOH — SOCIAL STABILITY: SOCIAL INSECURITY: ABUSE: ADULT

## 2024-11-14 SDOH — ECONOMIC STABILITY: HOUSING INSECURITY: IN THE LAST 12 MONTHS, WAS THERE A TIME WHEN YOU WERE NOT ABLE TO PAY THE MORTGAGE OR RENT ON TIME?: NO

## 2024-11-14 SDOH — ECONOMIC STABILITY: FOOD INSECURITY: HOW HARD IS IT FOR YOU TO PAY FOR THE VERY BASICS LIKE FOOD, HOUSING, MEDICAL CARE, AND HEATING?: NOT HARD AT ALL

## 2024-11-14 SDOH — ECONOMIC STABILITY: TRANSPORTATION INSECURITY: IN THE PAST 12 MONTHS, HAS LACK OF TRANSPORTATION KEPT YOU FROM MEDICAL APPOINTMENTS OR FROM GETTING MEDICATIONS?: NO

## 2024-11-14 SDOH — SOCIAL STABILITY: SOCIAL INSECURITY: ARE YOU OR HAVE YOU BEEN THREATENED OR ABUSED PHYSICALLY, EMOTIONALLY, OR SEXUALLY BY ANYONE?: NO

## 2024-11-14 SDOH — SOCIAL STABILITY: SOCIAL INSECURITY: WERE YOU ABLE TO COMPLETE ALL THE BEHAVIORAL HEALTH SCREENINGS?: YES

## 2024-11-14 SDOH — SOCIAL STABILITY: SOCIAL INSECURITY: DO YOU FEEL ANYONE HAS EXPLOITED OR TAKEN ADVANTAGE OF YOU FINANCIALLY OR OF YOUR PERSONAL PROPERTY?: NO

## 2024-11-14 SDOH — ECONOMIC STABILITY: FOOD INSECURITY: WITHIN THE PAST 12 MONTHS, THE FOOD YOU BOUGHT JUST DIDN'T LAST AND YOU DIDN'T HAVE MONEY TO GET MORE.: PATIENT DECLINED

## 2024-11-14 ASSESSMENT — COGNITIVE AND FUNCTIONAL STATUS - GENERAL
MOBILITY SCORE: 24
MOBILITY SCORE: 24
DAILY ACTIVITIY SCORE: 24
PATIENT BASELINE BEDBOUND: NO
DAILY ACTIVITIY SCORE: 24

## 2024-11-14 ASSESSMENT — ACTIVITIES OF DAILY LIVING (ADL)
DRESSING YOURSELF: INDEPENDENT
TOILETING: INDEPENDENT
WALKS IN HOME: INDEPENDENT
ADEQUATE_TO_COMPLETE_ADL: YES
PATIENT'S MEMORY ADEQUATE TO SAFELY COMPLETE DAILY ACTIVITIES?: YES
FEEDING YOURSELF: INDEPENDENT
JUDGMENT_ADEQUATE_SAFELY_COMPLETE_DAILY_ACTIVITIES: YES
GROOMING: INDEPENDENT
HEARING - LEFT EAR: FUNCTIONAL
LACK_OF_TRANSPORTATION: NO
HEARING - RIGHT EAR: FUNCTIONAL
LACK_OF_TRANSPORTATION: NO
BATHING: INDEPENDENT

## 2024-11-14 ASSESSMENT — PATIENT HEALTH QUESTIONNAIRE - PHQ9
SUM OF ALL RESPONSES TO PHQ9 QUESTIONS 1 & 2: 0
2. FEELING DOWN, DEPRESSED OR HOPELESS: NOT AT ALL
1. LITTLE INTEREST OR PLEASURE IN DOING THINGS: NOT AT ALL

## 2024-11-14 ASSESSMENT — PAIN - FUNCTIONAL ASSESSMENT
PAIN_FUNCTIONAL_ASSESSMENT: 0-10
PAIN_FUNCTIONAL_ASSESSMENT: 0-10

## 2024-11-14 ASSESSMENT — PAIN SCALES - GENERAL
PAINLEVEL_OUTOF10: 5 - MODERATE PAIN
PAINLEVEL_OUTOF10: 0 - NO PAIN
PAINLEVEL_OUTOF10: 3
PAINLEVEL_OUTOF10: 5 - MODERATE PAIN
PAINLEVEL_OUTOF10: 5 - MODERATE PAIN
PAINLEVEL_OUTOF10: 0 - NO PAIN

## 2024-11-14 ASSESSMENT — LIFESTYLE VARIABLES
AUDIT-C TOTAL SCORE: 0
HOW MANY STANDARD DRINKS CONTAINING ALCOHOL DO YOU HAVE ON A TYPICAL DAY: PATIENT DOES NOT DRINK
SKIP TO QUESTIONS 9-10: 1
HOW OFTEN DO YOU HAVE A DRINK CONTAINING ALCOHOL: NEVER
AUDIT-C TOTAL SCORE: 0
HOW OFTEN DO YOU HAVE 6 OR MORE DRINKS ON ONE OCCASION: NEVER

## 2024-11-14 NOTE — ED PROVIDER NOTES
HPI   No chief complaint on file.      Patient presents to the emergency department by squad from home secondary to chest pain with elevated blood pressure.  The patient was just discharged from our facility yesterday after being admitted for elevated hypertension with subsequent CVA.  Patient has had no medication changes in regards to her blood pressure regimen.  Reports compliance with her medication.  Reports no headache, blurred vision, or other symptoms.      History provided by:  Patient and EMS personnel   used: No            Patient History   Past Medical History:   Diagnosis Date    Anxiety     Depression     Disease of thyroid gland     Hyperlipidemia     Hypertension     Personal history of other diseases of the musculoskeletal system and connective tissue 04/28/2022    History of joint pain    Sleep apnea      Past Surgical History:   Procedure Laterality Date    CARDIAC CATHETERIZATION      CERVICAL SPINE SURGERY      OTHER SURGICAL HISTORY  11/27/2019    Appendectomy laparoscopic    OTHER SURGICAL HISTORY  11/27/2019    Dilation and curettage    OTHER SURGICAL HISTORY  11/27/2019    Tonsillectomy    OTHER SURGICAL HISTORY  11/27/2019    Colonoscopy    OTHER SURGICAL HISTORY  11/27/2019    Loop electrosurgical excision procedure    OTHER SURGICAL HISTORY  11/27/2019    Tubal ligation    OTHER SURGICAL HISTORY  12/01/2019    Cholecystectomy laparoscopic    OTHER SURGICAL HISTORY  03/10/2020    Carpal tunnel surgery     Family History   Problem Relation Name Age of Onset    Other (Bilateral carotid artery stenosis) Mother      Colonic polyp Mother      Thyroid disease Mother      Cervical cancer Mother          Primary    Other (cva) Mother      Osteoporosis Mother      Emphysema Father      Stroke Sister      Colonic polyp Sister      Diabetes Sister      Hypertension Sister      Heart attack Sister      Heart attack Sister      Other (Cardiac disorder) Half-Brother      Other  (cardiac disorder) Other grandmother     Lung cancer Other grandmother     Throat cancer Other grandfather      Social History     Tobacco Use    Smoking status: Former     Types: Cigarettes     Passive exposure: Past    Smokeless tobacco: Never   Vaping Use    Vaping status: Never Used   Substance Use Topics    Alcohol use: Yes     Comment: occasional    Drug use: Never       Physical Exam   ED Triage Vitals   Temp Pulse Resp BP   -- -- -- --      SpO2 Temp src Heart Rate Source Patient Position   -- -- -- --      BP Location FiO2 (%)     -- --       Physical Exam  Vitals and nursing note reviewed.   Constitutional:       General: She is not in acute distress.     Appearance: Normal appearance. She is normal weight. She is not ill-appearing, toxic-appearing or diaphoretic.   HENT:      Head: Normocephalic and atraumatic.      Nose: Nose normal. No rhinorrhea.   Neck:      Comments: Trachea is midline  Cardiovascular:      Rate and Rhythm: Normal rate and regular rhythm.      Heart sounds: No murmur heard.  Pulmonary:      Effort: Pulmonary effort is normal.      Breath sounds: Normal breath sounds. No wheezing.   Abdominal:      General: Abdomen is flat. Bowel sounds are normal. There is no distension.      Palpations: Abdomen is soft.      Tenderness: There is no abdominal tenderness.   Musculoskeletal:         General: Normal range of motion.      Cervical back: Normal range of motion.   Skin:     General: Skin is warm and dry.      Findings: No rash.   Neurological:      General: No focal deficit present.      Mental Status: She is alert and oriented to person, place, and time. Mental status is at baseline.      Cranial Nerves: No cranial nerve deficit.      Sensory: No sensory deficit.   Psychiatric:         Mood and Affect: Mood normal.         Behavior: Behavior normal.         Thought Content: Thought content normal.         Judgment: Judgment normal.           ED Course & MDM   Diagnoses as of 11/18/24 7627    Hypertensive urgency                 No data recorded                                 Medical Decision Making  Twelve-lead EKG was interpreted by myself and this was noted to contribute directly to patient care.  Study reveals normal sinus rhythm at 72 bpm, normal axis, early R wave progression, no acute ischemic changes.    Patient was given IV labetalol here in the emergency room which was noted to blower her blood pressure considerably.  Imaging studies are unremarkable.  She has an NIH stroke scale score of 0 at this time.    Patient arrives with elevated blood pressure after recently diagnosed CVA.  As her blood pressure is still under poor control I feel discharge at this time would be an appropriate.  Patient is agreeable to admission.  Case was discussed with the hospitalist service who will admit in stable condition.        Procedure  Procedures     Thiago Trejo, DO  11/18/24 6180

## 2024-11-14 NOTE — H&P
History Of Present Illness  Tuesday BISI Mcguire is a 64 y.o. female presenting with elevated blood pressure checked at home accompanied by headache and nausea but no vomiting.  She denies the worst headache of her life.  Pain 8/10 and no blurry vision or dysphagia or slurred speech.  She was just discharged after having a stroke and returns with increasing blood pressure readings.  In ER she was found to be at 204/123 and given labetalol IV which improved it.  Denies any auditory or rhinorrhea.  No focal weakness or paralysis or numbness or paresthesias or tingling.  She has chronic peptic ulcer disease according to her and slight epigastric pain but no loss of appetite or weight loss.  No focal diplopia.  No URI.  No ear pressure fullness discharge.  No neck pain.  No seizures.  No chest pain or exertional dyspnea.  No palpitations or dizziness or syncope.  No back pain flank pain hematuria dysuria.  Blood pressure improving at present.  She took losartan 100 Mg at home but did not help her pressure.  She used to be on HCTZ and quit taking it few months ago.  Former smoker and denies any alcohol or drug abuse.  No bleeding.  No skin rash or joint pains.  No fall or trauma or loss of consciousness.  EKG sinus rhythm, troponin unremarkable  Chest x-ray no pulmonary edema  CT head negative for acute process  CTA chest no PE but pulmonary nodule present  No indigestion or diarrhea constipation  Possible reflux acidity or GERD or peptic ulcer disease but the patient  Past Medical History  Past Medical History:   Diagnosis Date    Anxiety     Depression     Disease of thyroid gland     Hyperlipidemia     Hypertension     Personal history of other diseases of the musculoskeletal system and connective tissue 04/28/2022    History of joint pain    Sleep apnea     Stroke (Multi)      CVA  Hypertension  Hyperlipidemia  Arthritis  Sleep apnea  Depression anxiety  Carotid stenosis  Surgical History  Past Surgical History:    Procedure Laterality Date    CARDIAC CATHETERIZATION      CERVICAL SPINE SURGERY      OTHER SURGICAL HISTORY  11/27/2019    Appendectomy laparoscopic    OTHER SURGICAL HISTORY  11/27/2019    Dilation and curettage    OTHER SURGICAL HISTORY  11/27/2019    Tonsillectomy    OTHER SURGICAL HISTORY  11/27/2019    Colonoscopy    OTHER SURGICAL HISTORY  11/27/2019    Loop electrosurgical excision procedure    OTHER SURGICAL HISTORY  11/27/2019    Tubal ligation    OTHER SURGICAL HISTORY  12/01/2019    Cholecystectomy laparoscopic    OTHER SURGICAL HISTORY  03/10/2020    Carpal tunnel surgery        Social History  She reports that she has quit smoking. Her smoking use included cigarettes. She has been exposed to tobacco smoke. She has never used smokeless tobacco. She reports current alcohol use. She reports that she does not use drugs.    Family History  Family History   Problem Relation Name Age of Onset    Other (Bilateral carotid artery stenosis) Mother      Colonic polyp Mother      Thyroid disease Mother      Cervical cancer Mother          Primary    Other (cva) Mother      Osteoporosis Mother      Emphysema Father      Stroke Sister      Colonic polyp Sister      Diabetes Sister      Hypertension Sister      Heart attack Sister      Heart attack Sister      Other (Cardiac disorder) Half-Brother      Other (cardiac disorder) Other grandmother     Lung cancer Other grandmother     Throat cancer Other grandfather    As above     Allergies  Codeine, Etodolac, Naproxen, and Sulfamethoxazole    Review of Systems  All other 12 point review of systems negative except HPI  Physical Exam   General Appearance: AAO x 3,  Skin: skin color pink, warm, and dry; no suspicious rashes or lesions  Eyes : PERRL, EOM's intact  ENT: mucous membranes pink and moist  Neck: normocephalic  Respiratory: lungs clear to auscultation anteriorly; no wheezing, rhonchi, or crackles.   Heart: regular rate and rhythm. telemetry shows sinus  "rhythm  Abdomen: Nondistended, positive bowel sounds x4, soft,  nontender  Extremities: no edema   Peripheral pulses: normal x4 extremities  Neuro: alert, coherent and conversant, no focal motor deficits  Last Recorded Vitals  Blood pressure 130/70, pulse 84, temperature 36.3 °C (97.3 °F), temperature source Temporal, resp. rate 18, height 1.575 m (5' 2\"), weight 73.9 kg (163 lb), SpO2 96%.    Relevant Results  Scheduled medications  aspirin, 81 mg, oral, Nightly  atorvastatin, 80 mg, oral, Nightly  [START ON 11/15/2024] buPROPion XL, 150 mg, oral, q AM  [START ON 11/15/2024] clopidogrel, 75 mg, oral, Daily  enoxaparin, 40 mg, subcutaneous, q24h  FLUoxetine, 40 mg, oral, Daily  gabapentin, 300 mg, oral, TID  isosorbide mononitrate ER, 30 mg, oral, Daily  [START ON 11/15/2024] levothyroxine, 88 mcg, oral, Daily  [START ON 11/15/2024] losartan, 100 mg, oral, Daily  polyethylene glycol, 17 g, oral, Daily      Continuous medications     PRN medications  PRN medications: acetaminophen **OR** acetaminophen **OR** acetaminophen, acetaminophen **OR** acetaminophen **OR** acetaminophen, labetaloL, ondansetron ODT **OR** ondansetron, oxyCODONE    Results for orders placed or performed during the hospital encounter of 11/14/24 (from the past 24 hours)   ECG 12 lead   Result Value Ref Range    Ventricular Rate 72 BPM    Atrial Rate 72 BPM    VA Interval 130 ms    QRS Duration 86 ms    QT Interval 386 ms    QTC Calculation(Bazett) 422 ms    P Axis 79 degrees    R Axis 76 degrees    T Axis 89 degrees    QRS Count 12 beats    Q Onset 220 ms    P Onset 155 ms    P Offset 194 ms    T Offset 413 ms    QTC Fredericia 410 ms   CBC and Auto Differential   Result Value Ref Range    WBC 9.8 4.4 - 11.3 x10*3/uL    nRBC 0.0 0.0 - 0.0 /100 WBCs    RBC 4.76 4.00 - 5.20 x10*6/uL    Hemoglobin 14.0 12.0 - 16.0 g/dL    Hematocrit 43.1 36.0 - 46.0 %    MCV 91 80 - 100 fL    MCH 29.4 26.0 - 34.0 pg    MCHC 32.5 32.0 - 36.0 g/dL    RDW 14.6 (H) " 11.5 - 14.5 %    Platelets 230 150 - 450 x10*3/uL    Neutrophils % 71.6 40.0 - 80.0 %    Immature Granulocytes %, Automated 0.2 0.0 - 0.9 %    Lymphocytes % 18.9 13.0 - 44.0 %    Monocytes % 7.4 2.0 - 10.0 %    Eosinophils % 1.4 0.0 - 6.0 %    Basophils % 0.5 0.0 - 2.0 %    Neutrophils Absolute 7.04 1.20 - 7.70 x10*3/uL    Immature Granulocytes Absolute, Automated 0.02 0.00 - 0.70 x10*3/uL    Lymphocytes Absolute 1.86 1.20 - 4.80 x10*3/uL    Monocytes Absolute 0.73 0.10 - 1.00 x10*3/uL    Eosinophils Absolute 0.14 0.00 - 0.70 x10*3/uL    Basophils Absolute 0.05 0.00 - 0.10 x10*3/uL   Basic metabolic panel   Result Value Ref Range    Glucose 89 74 - 99 mg/dL    Sodium 134 (L) 136 - 145 mmol/L    Potassium 4.3 3.5 - 5.3 mmol/L    Chloride 102 98 - 107 mmol/L    Bicarbonate 26 21 - 32 mmol/L    Anion Gap 10 10 - 20 mmol/L    Urea Nitrogen 25 (H) 6 - 23 mg/dL    Creatinine 1.16 (H) 0.50 - 1.05 mg/dL    eGFR 53 (L) >60 mL/min/1.73m*2    Calcium 9.6 8.6 - 10.3 mg/dL   Magnesium   Result Value Ref Range    Magnesium 1.89 1.60 - 2.40 mg/dL   Troponin I, High Sensitivity, Initial   Result Value Ref Range    Troponin I, High Sensitivity 3 0 - 13 ng/L   Troponin, High Sensitivity, 1 Hour   Result Value Ref Range    Troponin I, High Sensitivity 4 0 - 13 ng/L     *Note: Due to a large number of results and/or encounters for the requested time period, some results have not been displayed. A complete set of results can be found in Results Review.       ECG 12 lead    Result Date: 11/14/2024  Normal sinus rhythm Septal infarct , age undetermined Abnormal ECG When compared with ECG of 12-NOV-2024 17:01, (unconfirmed) Vent. rate has decreased BY  35 BPM QRS axis Shifted right QRS voltage has increased Septal infarct is now Present Nonspecific T wave abnormality no longer evident in Inferior leads T wave amplitude has increased in Anterolateral leads    CT angio chest for pulmonary embolism    Result Date: 11/14/2024  Interpreted By:   Katarina Tuttle, STUDY: CT ANGIO CHEST FOR PULMONARY EMBOLISM;  11/14/2024 3:20 pm   INDICATION: Signs/Symptoms:cp/htn.     COMPARISON: 04/26/2023   ACCESSION NUMBER(S): [id number]   ORDERING CLINICIAN: VENU RIBERA   TECHNIQUE: CT of the chest was performed. Sagittal and coronal reconstructions were generated. 68 ML Omnipaque 350 intravenous contrast given for the examination.  Multiplanar reconstructions of the pulmonary vessels were created on an independent workstation and provided for review.   FINDINGS:     CHEST WALL AND LOWER NECK: No significant axillary lymphadenopathy.   MEDIASTINUM AND KONSTANTIN:  Few nodes measuring up to 1.1 cm in the right hilum.   HEART AND VESSELS:  No pulmonary artery filling defect to suggest PE. The heart is normal in size. No significant pericardial effusion. No thoracic aortic aneurysm. Atherosclerotic calcifications and/or stent material in the coronary arteries.   LUNGS, PLEURA, LARGE AIRWAYS:  Slight heterogeneity of the lung bases including probable mosaic attenuation in both lower lobes similar to the prior exam. 8 mm right lower lobe nodule image 80/132 similar to the previous exam in retrospect. No confluent airspace opacity or pleural effusion. The central airways are patent.   UPPER ABDOMEN:  No focal lesion in the included upper abdominal viscera. Presumed cholecystectomy clips at the liver hilum.   BONES:  Anterior fusion hardware near the cervicothoracic junction. Degenerative endplate spurring in the lower thoracic spine.       No evidence of PE.   Heterogeneity of both lung bases, possible mosaic attenuation related to small airways disease and air trapping versus atypical infection, similar to 04/26/2023.   8 mm indeterminate right lower lobe pulmonary nodule stable from 04/26/2023 in retrospect. Continued follow-up to assess stability over at least a 2 year surveillance interval recommended. See below.   Mild right hilar adenopathy. Attention at follow-up  suggested.   MACRO: Incidental Finding:  A solid non-calcified pulmonary nodule measuring 6-8 mm .  (**-YCF-**)   Instructions:  Consider follow up non contrast chest CT at 6-12 months, then consider CT chest at 18-24 months. (Brown Mcdonald et al., Guidelines for management of incidental pulmonary nodules detected on CT images: From the Fleischner Society 2017, Radiology. 2017 Jul;284 (1):228-243.) FLECHARLESNER.ACR.IF.2   Signed by: Katarina Tuttle 11/14/2024 3:40 PM Dictation workstation:   [id number]    CT head wo IV contrast    Result Date: 11/14/2024  Interpreted By:  Katarina Tuttle, STUDY: CT HEAD WO IV CONTRAST;  11/14/2024 3:11 pm   INDICATION: Signs/Symptoms:htn.     COMPARISON: 11/12/2024   ACCESSION NUMBER(S): [id number]   ORDERING CLINICIAN: VENU RIBERA   TECHNIQUE: Unenhanced CT images of the head were obtained.   FINDINGS: The ventricles, cisterns and sulci are prominent, consistent with diffuse volume loss. There are areas of nonspecific white matter hypodensity, which are probably age related or microvascular in nature. These findings are similar to the previous exam. There is no acute intracranial hemorrhage, mass effect or midline shift. No extraaxial fluid collection.   No focal calvarial lesion.   Visualized paranasal sinuses are clear.           No acute intracranial hemorrhage or mass-effect.   MACRO: None.   Signed by: Katarina Tuttle 11/14/2024 3:30 PM Dictation workstation:   [id number]    XR chest 1 view    Result Date: 11/14/2024  Interpreted By:  Katarina Tuttle, STUDY: XR CHEST 1 VIEW;  11/14/2024 2:51 pm   INDICATION: Signs/Symptoms:htn.     COMPARISON: 05/26/2024   ACCESSION NUMBER(S): [id number]   ORDERING CLINICIAN: VENU RIBERA   FINDINGS: Artifact from overlying monitoring leads noted. Apical lordotic projection obtained. Probable prominent fat pad near the cardiac apex similar to the prior exam allowing for differences in projection. No focal infiltrate, pleural effusion or  pneumothorax identified. The cardiac silhouette is within normal limits for size. Partially included cervical spine fusion hardware again seen.       No acute cardiopulmonary process radiographically.   MACRO: None.   Signed by: Katarina Tuttle 11/14/2024 3:29 PM Dictation workstation:   [id number]    Transthoracic Echo (TTE) Complete    Result Date: 11/13/2024             [place] [address] Phone [phone], Fax [phone] TRANSTHORACIC ECHOCARDIOGRAM REPORT Patient Name:       AMY MANTILLA  Reading Physician:    [id number] Behzad Martinez MD Study Date:         11/13/2024          Ordering Provider:    [id number] GILBERTO MA MRN/PID:            [id number]            Fellow: Accession#:         [id number]        Nurse:                Angelita Pace RN Date of Birth/Age:  1960 / 64 years Sonographer:          JMAIE Navarrete RVT Gender Assigned at  F                   Additional Staff: Birth: Height:             157.48 cm           Admit Date:           11/11/2024 Weight:             73.94 kg            Admission Status:     Inpatient -                                                               Routine BSA / BMI:          1.75 m2 / 29.81     Department Location:  [address]                     kg/m2 Blood Pressure: 110 /70 mmHg Study Type:    TRANSTHORACIC ECHO (TTE) COMPLETE Diagnosis/ICD: Transient cerebral ischemic attack, unspecified (NOT on                LCD)-G45.9 Indication:    Transischemic Attack CPT Codes:     Echo Complete w Full Doppler-[id number] Patient History: Pertinent History: Previous echo 5-9-23. Study Detail: The following Echo studies were performed: 2D, M-Mode, Doppler and               color flow. Definity used as a contrast agent for endocardial                border definition and agitated saline used as a contrast agent for               intraseptal flow evaluation. Total contrast used for this               procedure was 2 mL via IV push. The patient was awake.  PHYSICIAN INTERPRETATION: Left Ventricle: Left ventricular ejection fraction is normal, calculated by Elkins's biplane at 60%. There are no regional wall motion abnormalities. The left ventricular cavity size is normal. There is left ventricular concentric remodeling. Spectral Doppler shows a normal pattern of left ventricular diastolic filling. Left Atrium: The left atrium is normal in size. A bubble study using agitated saline was performed. Bubble study is negative. Right Ventricle: The right ventricle is normal in size. There is normal right ventricular global systolic function. Right Atrium: The right atrium is normal in size. Aortic Valve: The aortic valve is probably trileaflet. The aortic valve dimensionless index is 0.74. There is no evidence of aortic valve regurgitation. The peak instantaneous gradient of the aortic valve is 8 mmHg. The mean gradient of the aortic valve is 4 mmHg. Mitral Valve: The mitral valve is normal in structure. There is no evidence of mitral valve regurgitation. Tricuspid Valve: The tricuspid valve is structurally normal. No evidence of tricuspid regurgitation. Pulmonic Valve: The pulmonic valve is not well visualized. There is no indication of pulmonic valve regurgitation. Pericardium: No pericardial effusion noted. Aorta: The aortic root is normal. Systemic Veins: The inferior vena cava was not well visualized. In comparison to the previous echocardiogram(s): Compared with study dated 5/9/2023, no significant change.  CONCLUSIONS:  1. Left ventricular ejection fraction is normal, calculated by Elkins's biplane at 60%.  2. There is normal right ventricular global systolic function. QUANTITATIVE DATA SUMMARY:  2D MEASUREMENTS:           Normal Ranges: Ao Root  d:       2.30 cm   (2.0-3.7cm) LAs:             2.50 cm   (2.7-4.0cm) IVSd:            0.99 cm   (0.6-1.1cm) LVPWd:           1.00 cm   (0.6-1.1cm) LVIDd:           3.62 cm   (3.9-5.9cm) LVIDs:           2.71 cm LV Mass Index:   61.5 g/m2 LV % FS          25.1 %  LA VOLUME:                    Normal Ranges: LA Vol A4C:        22.4 ml    (22+/-6mL/m2) LA Vol A2C:        23.1 ml LA Vol BP:         23.7 ml LA Vol Index A4C:  12.8ml/m2 LA Vol Index A2C:  13.2 ml/m2 LA Vol Index BP:   13.5 ml/m2 LA Area A4C:       11.0 cm2 LA Area A2C:       10.7 cm2 LA Major Axis A4C: 4.6 cm LA Major Axis A2C: 4.2 cm LA Volume Index:   13.0 ml/m2 LA Vol A4C:        21.7 ml LA Vol A2C:        22.8 ml LA Vol Index BSA:  12.7 ml/m2  M-MODE MEASUREMENTS:         Normal Ranges: AoV Exc:             1.70 cm (1.5-2.5cm)  AORTA MEASUREMENTS:         Normal Ranges: AoV Exc:            1.70 cm (1.5-2.5cm)  LV SYSTOLIC FUNCTION BY 2D PLANIMETRY (MOD):                      Normal Ranges: EF-A4C View:    62 % (>=55%) EF-A2C View:    55 % EF-Biplane:     60 % LV EF Reported: 60 %  LV DIASTOLIC FUNCTION:           Normal Ranges: MV Peak E:             0.83 m/s  (0.7-1.2 m/s) MV Peak A:             0.75 m/s  (0.42-0.7 m/s) E/A Ratio:             1.11      (1.0-2.2) MV e'                  0.083 m/s (>8.0) MV lateral e'          0.09 m/s MV medial e'           0.08 m/s E/e' Ratio:            10.08     (<8.0)  MITRAL VALVE:          Normal Ranges: MV DT:        187 msec (150-240msec)  AORTIC VALVE:                     Normal Ranges: AoV Vmax:                1.43 m/s (<=1.7m/s) AoV Peak P.2 mmHg (<20mmHg) AoV Mean P.0 mmHg (1.7-11.5mmHg) LVOT Max Zuhair:            1.11 m/s (<=1.1m/s) AoV VTI:                 30.90 cm (18-25cm) LVOT VTI:                22.80 cm LVOT Diameter:           1.80 cm  (1.8-2.4cm) AoV Area, VTI:           1.88 cm2 (2.5-5.5cm2) AoV Area,Vmax:           1.98 cm2 (2.5-4.5cm2) AoV Dimensionless Index: 0.74   RIGHT VENTRICLE: RV Basal 3.09 cm RV Mid   1.75 cm RV Major 6.0 cm TAPSE:   13.1 mm  TRICUSPID VALVE/RVSP:          Normal Ranges: Peak TR Velocity:     2.69 m/s RV Syst Pressure:     32 mmHg  (< 30mmHg)  PULMONIC VALVE:          Normal Ranges: PV Accel Time:  92 msec  (>120ms) PV Max Zuhair:     1.3 m/s  (0.6-0.9m/s) PV Max P.0 mmHg  [id number] Behzad Martinez MD Electronically signed on 2024 at 12:09:52 PM  ** Final **            Assessment/Plan   Assessment & Plan  HTN (hypertension), benign      64-year-old female with history of  CVA  Hypertension  Hyperlipidemia  Arthritis  Sleep apnea  Depression anxiety  Carotid stenosis  History of smoking  Hypothyroidism  Possible GERD versus peptic ulcer disease per the patient  Presented with  Headache  Hypertensive urgency  Pulmonary nodule  Plan  Cardiopulmonary monitoring  Losartan 100 mg daily  Imdur 30 Mg daily  Labetalol IV as needed  Avoid hypoperfusion  Optimally reduce pressure within fairly reasonable range  Diet exercise  Education counseling  Supportive care  To follow pulmonology outpatient  Aspirin and Plavix for recent stroke  Atorvastatin 80 Mg daily for hyperlipidemia  On Neurontin for pain  Antiemetics  Tylenol or oxycodone for headache  Symptomatic management  On bupropion for anxiety  Fluoxetine for depression  Synthroid for hypothyroidism  Lovenox 40 Mg subcu daily for DVT prophylaxis  To follow vascular outpatient for carotid stenosis and possible carotid endarterectomy consideration  Famotidine for GI prophylaxis  Further management as clinical course evolves                                             Isis Barth MD

## 2024-11-14 NOTE — NURSING NOTE
11/14/24 - 1124: Post discharge call made. No voiced concerns or questions at this time. Encouraged to call discharge nurse should any concerns arise.

## 2024-11-15 LAB
ALBUMIN SERPL BCP-MCNC: 3.8 G/DL (ref 3.4–5)
ALP SERPL-CCNC: 93 U/L (ref 33–136)
ALT SERPL W P-5'-P-CCNC: 22 U/L (ref 7–45)
ANION GAP SERPL CALC-SCNC: 10 MMOL/L (ref 10–20)
AST SERPL W P-5'-P-CCNC: 20 U/L (ref 9–39)
ATRIAL RATE: 71 BPM
ATRIAL RATE: 72 BPM
BILIRUB SERPL-MCNC: 0.6 MG/DL (ref 0–1.2)
BUN SERPL-MCNC: 23 MG/DL (ref 6–23)
CALCIUM SERPL-MCNC: 9 MG/DL (ref 8.6–10.3)
CHLORIDE SERPL-SCNC: 104 MMOL/L (ref 98–107)
CO2 SERPL-SCNC: 26 MMOL/L (ref 21–32)
CREAT SERPL-MCNC: 1.22 MG/DL (ref 0.5–1.05)
EGFRCR SERPLBLD CKD-EPI 2021: 50 ML/MIN/1.73M*2
ERYTHROCYTE [DISTWIDTH] IN BLOOD BY AUTOMATED COUNT: 14.6 % (ref 11.5–14.5)
GLUCOSE SERPL-MCNC: 88 MG/DL (ref 74–99)
HCT VFR BLD AUTO: 38.7 % (ref 36–46)
HGB BLD-MCNC: 12.8 G/DL (ref 12–16)
MCH RBC QN AUTO: 30 PG (ref 26–34)
MCHC RBC AUTO-ENTMCNC: 33.1 G/DL (ref 32–36)
MCV RBC AUTO: 91 FL (ref 80–100)
NRBC BLD-RTO: 0 /100 WBCS (ref 0–0)
P AXIS: 62 DEGREES
P AXIS: 79 DEGREES
P OFFSET: 194 MS
P OFFSET: 194 MS
P ONSET: 155 MS
P ONSET: 155 MS
PLATELET # BLD AUTO: 217 X10*3/UL (ref 150–450)
POTASSIUM SERPL-SCNC: 4.1 MMOL/L (ref 3.5–5.3)
PR INTERVAL: 124 MS
PR INTERVAL: 130 MS
PROT SERPL-MCNC: 6.3 G/DL (ref 6.4–8.2)
Q ONSET: 217 MS
Q ONSET: 220 MS
QRS COUNT: 11 BEATS
QRS COUNT: 12 BEATS
QRS DURATION: 86 MS
QRS DURATION: 86 MS
QT INTERVAL: 386 MS
QT INTERVAL: 386 MS
QTC CALCULATION(BAZETT): 419 MS
QTC CALCULATION(BAZETT): 422 MS
QTC FREDERICIA: 408 MS
QTC FREDERICIA: 410 MS
R AXIS: 75 DEGREES
R AXIS: 76 DEGREES
RBC # BLD AUTO: 4.27 X10*6/UL (ref 4–5.2)
SODIUM SERPL-SCNC: 136 MMOL/L (ref 136–145)
T AXIS: 55 DEGREES
T AXIS: 89 DEGREES
T OFFSET: 410 MS
T OFFSET: 413 MS
VENTRICULAR RATE: 71 BPM
VENTRICULAR RATE: 72 BPM
WBC # BLD AUTO: 9.1 X10*3/UL (ref 4.4–11.3)

## 2024-11-15 PROCEDURE — G0378 HOSPITAL OBSERVATION PER HR: HCPCS

## 2024-11-15 PROCEDURE — 2500000001 HC RX 250 WO HCPCS SELF ADMINISTERED DRUGS (ALT 637 FOR MEDICARE OP): Performed by: HOSPITALIST

## 2024-11-15 PROCEDURE — 96372 THER/PROPH/DIAG INJ SC/IM: CPT | Performed by: HOSPITALIST

## 2024-11-15 PROCEDURE — 99233 SBSQ HOSP IP/OBS HIGH 50: CPT | Performed by: HOSPITALIST

## 2024-11-15 PROCEDURE — 2500000004 HC RX 250 GENERAL PHARMACY W/ HCPCS (ALT 636 FOR OP/ED): Performed by: HOSPITALIST

## 2024-11-15 PROCEDURE — 80053 COMPREHEN METABOLIC PANEL: CPT | Performed by: HOSPITALIST

## 2024-11-15 PROCEDURE — 85027 COMPLETE CBC AUTOMATED: CPT | Performed by: HOSPITALIST

## 2024-11-15 PROCEDURE — 2500000002 HC RX 250 W HCPCS SELF ADMINISTERED DRUGS (ALT 637 FOR MEDICARE OP, ALT 636 FOR OP/ED): Performed by: HOSPITALIST

## 2024-11-15 PROCEDURE — 36415 COLL VENOUS BLD VENIPUNCTURE: CPT | Performed by: HOSPITALIST

## 2024-11-15 RX ORDER — HYDROCODONE BITARTRATE AND ACETAMINOPHEN 5; 325 MG/1; MG/1
1 TABLET ORAL EVERY 6 HOURS PRN
Status: DISCONTINUED | OUTPATIENT
Start: 2024-11-15 | End: 2024-11-16 | Stop reason: HOSPADM

## 2024-11-15 RX ORDER — HYDROCODONE BITARTRATE AND ACETAMINOPHEN 7.5; 325 MG/15ML; MG/15ML
5 SOLUTION ORAL EVERY 6 HOURS PRN
Status: DISCONTINUED | OUTPATIENT
Start: 2024-11-15 | End: 2024-11-15

## 2024-11-15 ASSESSMENT — PAIN SCALES - GENERAL
PAINLEVEL_OUTOF10: 5 - MODERATE PAIN
PAINLEVEL_OUTOF10: 2
PAINLEVEL_OUTOF10: 5 - MODERATE PAIN
PAINLEVEL_OUTOF10: 5 - MODERATE PAIN
PAINLEVEL_OUTOF10: 2
PAINLEVEL_OUTOF10: 0 - NO PAIN
PAINLEVEL_OUTOF10: 4
PAINLEVEL_OUTOF10: 0 - NO PAIN

## 2024-11-15 ASSESSMENT — PAIN DESCRIPTION - DESCRIPTORS
DESCRIPTORS: ACHING
DESCRIPTORS: ACHING

## 2024-11-15 ASSESSMENT — PAIN - FUNCTIONAL ASSESSMENT
PAIN_FUNCTIONAL_ASSESSMENT: 0-10

## 2024-11-15 ASSESSMENT — PAIN DESCRIPTION - LOCATION
LOCATION: HEAD

## 2024-11-15 NOTE — PROGRESS NOTES
11/15/24 1205   Discharge Planning   Living Arrangements Alone;Friends   Support Systems Children   Assistance Needed none   Type of Residence Private residence   Number of Stairs to Enter Residence 4   Number of Stairs Within Residence 0   Do you have animals or pets at home? Yes   Type of Animals or Pets 1 cat   Who is requesting discharge planning? Patient   Home or Post Acute Services None   Expected Discharge Disposition Home   Does the patient need discharge transport arranged? No     Met with pt at the bedside and verified address, phone number and emergency contact information. PCP is [place] last seen in August and preferred pharmacy is [facility], denies issues obtaining or affording medications and takes as ordered. Pt is independent, lives at home and feels safe. Plan is to return home no new needs. [facility] 24/24. Adod IS 11/16. CT to follow. Kaila Jacob BSN/RN-TCC

## 2024-11-15 NOTE — PROGRESS NOTES
"Turandy Mcguire is a 64 y.o. female on day 0 of admission presenting with HTN (hypertension), benign.    Subjective   Headache but no focal weakness, no nausea vomiting diarrhea or bleeding   No bleeding dysphagia slurred speech or otorhinorrhea.  No chest pain shortness of breath  No sweating tremors nervousness muscle rigidity or any paralysis, no fever  Objective     Physical Exam  General Appearance: AAO x 3,  Skin: skin color pink, warm, and dry; no suspicious rashes or lesions  Eyes : PERRL, EOM's intact  ENT: mucous membranes pink and moist  Neck: normocephalic  Respiratory: lungs clear to auscultation anteriorly; no wheezing, rhonchi, or crackles.   Heart: regular rate and rhythm.   Abdomen: Nondistended, positive bowel sounds x4, soft,  nontender  Extremities: no edema   Peripheral pulses: normal x4 extremities  Neuro: alert, coherent and conversant, no focal motor deficits  Last Recorded Vitals  Blood pressure 102/60, pulse 65, temperature 36.3 °C (97.4 °F), temperature source Temporal, resp. rate 16, height 1.575 m (5' 2\"), weight 73.9 kg (163 lb), SpO2 95%.  Intake/Output last 3 Shifts:  I/O last 3 completed shifts:  In: 350 (4.7 mL/kg) [P.O.:350]  Out: - (0 mL/kg)   Weight: 73.9 kg     Relevant Results    Scheduled medications  aspirin, 81 mg, oral, Nightly  atorvastatin, 80 mg, oral, Nightly  buPROPion XL, 150 mg, oral, q AM  clopidogrel, 75 mg, oral, Daily  enoxaparin, 40 mg, subcutaneous, q24h  famotidine, 20 mg, oral, Daily  FLUoxetine, 40 mg, oral, Daily  gabapentin, 300 mg, oral, TID  isosorbide mononitrate ER, 30 mg, oral, Daily  levothyroxine, 88 mcg, oral, Daily  [Held by provider] losartan, 50 mg, oral, Daily  polyethylene glycol, 17 g, oral, Daily      Continuous medications     PRN medications  PRN medications: acetaminophen **OR** acetaminophen **OR** acetaminophen, acetaminophen **OR** acetaminophen **OR** acetaminophen, HYDROcodone-acetaminophen, labetaloL, ondansetron ODT **OR** " ondansetron, oxyCODONE    Results for orders placed or performed during the hospital encounter of 11/14/24 (from the past 24 hours)   CBC   Result Value Ref Range    WBC 9.1 4.4 - 11.3 x10*3/uL    nRBC 0.0 0.0 - 0.0 /100 WBCs    RBC 4.27 4.00 - 5.20 x10*6/uL    Hemoglobin 12.8 12.0 - 16.0 g/dL    Hematocrit 38.7 36.0 - 46.0 %    MCV 91 80 - 100 fL    MCH 30.0 26.0 - 34.0 pg    MCHC 33.1 32.0 - 36.0 g/dL    RDW 14.6 (H) 11.5 - 14.5 %    Platelets 217 150 - 450 x10*3/uL   Comprehensive metabolic panel   Result Value Ref Range    Glucose 88 74 - 99 mg/dL    Sodium 136 136 - 145 mmol/L    Potassium 4.1 3.5 - 5.3 mmol/L    Chloride 104 98 - 107 mmol/L    Bicarbonate 26 21 - 32 mmol/L    Anion Gap 10 10 - 20 mmol/L    Urea Nitrogen 23 6 - 23 mg/dL    Creatinine 1.22 (H) 0.50 - 1.05 mg/dL    eGFR 50 (L) >60 mL/min/1.73m*2    Calcium 9.0 8.6 - 10.3 mg/dL    Albumin 3.8 3.4 - 5.0 g/dL    Alkaline Phosphatase 93 33 - 136 U/L    Total Protein 6.3 (L) 6.4 - 8.2 g/dL    AST 20 9 - 39 U/L    Bilirubin, Total 0.6 0.0 - 1.2 mg/dL    ALT 22 7 - 45 U/L     *Note: Due to a large number of results and/or encounters for the requested time period, some results have not been displayed. A complete set of results can be found in Results Review.       ECG 12 lead    Result Date: 11/15/2024  Normal sinus rhythm Septal infarct , age undetermined Abnormal ECG When compared with ECG of 12-NOV-2024 17:01, (unconfirmed) Vent. rate has decreased BY  35 BPM QRS axis Shifted right QRS voltage has increased Septal infarct is now Present Nonspecific T wave abnormality no longer evident in Inferior leads T wave amplitude has increased in Anterolateral leads See ED provider note for full interpretation and clinical correlation Confirmed by Delmer De La O ([id number]) on 11/15/2024 9:55:24 AM    CT angio chest for pulmonary embolism    Result Date: 11/14/2024  Interpreted By:  Katarina Tuttle, STUDY: CT ANGIO CHEST FOR PULMONARY EMBOLISM;  11/14/2024 3:20 pm    INDICATION: Signs/Symptoms:cp/htn.     COMPARISON: 04/26/2023   ACCESSION NUMBER(S): [id number]   ORDERING CLINICIAN: VENU RIBERA   TECHNIQUE: CT of the chest was performed. Sagittal and coronal reconstructions were generated. 68 ML Omnipaque 350 intravenous contrast given for the examination.  Multiplanar reconstructions of the pulmonary vessels were created on an independent workstation and provided for review.   FINDINGS:     CHEST WALL AND LOWER NECK: No significant axillary lymphadenopathy.   MEDIASTINUM AND KONSTANTIN:  Few nodes measuring up to 1.1 cm in the right hilum.   HEART AND VESSELS:  No pulmonary artery filling defect to suggest PE. The heart is normal in size. No significant pericardial effusion. No thoracic aortic aneurysm. Atherosclerotic calcifications and/or stent material in the coronary arteries.   LUNGS, PLEURA, LARGE AIRWAYS:  Slight heterogeneity of the lung bases including probable mosaic attenuation in both lower lobes similar to the prior exam. 8 mm right lower lobe nodule image 80/132 similar to the previous exam in retrospect. No confluent airspace opacity or pleural effusion. The central airways are patent.   UPPER ABDOMEN:  No focal lesion in the included upper abdominal viscera. Presumed cholecystectomy clips at the liver hilum.   BONES:  Anterior fusion hardware near the cervicothoracic junction. Degenerative endplate spurring in the lower thoracic spine.       No evidence of PE.   Heterogeneity of both lung bases, possible mosaic attenuation related to small airways disease and air trapping versus atypical infection, similar to 04/26/2023.   8 mm indeterminate right lower lobe pulmonary nodule stable from 04/26/2023 in retrospect. Continued follow-up to assess stability over at least a 2 year surveillance interval recommended. See below.   Mild right hilar adenopathy. Attention at follow-up suggested.   MACRO: Incidental Finding:  A solid non-calcified pulmonary nodule measuring  6-8 mm .  (**-YCF-**)   Instructions:  Consider follow up non contrast chest CT at 6-12 months, then consider CT chest at 18-24 months. (Brown Mcdonald et al., Guidelines for management of incidental pulmonary nodules detected on CT images: From the Fleischner Society 2017, Radiology. 2017 Jul;284 (1):228-243.) SANTI.ACR.IF.[id number]   Signed by: Katarina Tuttle 11/14/2024 3:40 PM Dictation workstation:   [id number]    CT head wo IV contrast    Result Date: 11/14/2024  Interpreted By:  Katarina Tuttle, STUDY: CT HEAD WO IV CONTRAST;  11/14/2024 3:11 pm   INDICATION: Signs/Symptoms:htn.     COMPARISON: 11/12/2024   ACCESSION NUMBER(S): [id number]   ORDERING CLINICIAN: VENU RIBERA   TECHNIQUE: Unenhanced CT images of the head were obtained.   FINDINGS: The ventricles, cisterns and sulci are prominent, consistent with diffuse volume loss. There are areas of nonspecific white matter hypodensity, which are probably age related or microvascular in nature. These findings are similar to the previous exam. There is no acute intracranial hemorrhage, mass effect or midline shift. No extraaxial fluid collection.   No focal calvarial lesion.   Visualized paranasal sinuses are clear.           No acute intracranial hemorrhage or mass-effect.   MACRO: None.   Signed by: Katarina Tuttle 11/14/2024 3:30 PM Dictation workstation:   [id number]    XR chest 1 view    Result Date: 11/14/2024  Interpreted By:  Katarina Tuttle, STUDY: XR CHEST 1 VIEW;  11/14/2024 2:51 pm   INDICATION: Signs/Symptoms:htn.     COMPARISON: 05/26/2024   ACCESSION NUMBER(S): [id number]   ORDERING CLINICIAN: VENU RIBERA   FINDINGS: Artifact from overlying monitoring leads noted. Apical lordotic projection obtained. Probable prominent fat pad near the cardiac apex similar to the prior exam allowing for differences in projection. No focal infiltrate, pleural effusion or pneumothorax identified. The cardiac silhouette is within normal limits for size. Partially  included cervical spine fusion hardware again seen.       No acute cardiopulmonary process radiographically.   MACRO: None.   Signed by: Katarina Tuttle 11/14/2024 3:29 PM Dictation workstation:   [id number]                           Assessment/Plan   Assessment & Plan  HTN (hypertension), benign      64-year-old female with history of  CVA  Hypertension  Hyperlipidemia  Arthritis  Sleep apnea  Depression anxiety  Carotid stenosis  History of smoking  Hypothyroidism  Possible GERD versus peptic ulcer disease per the patient  Presented with  Headache  Hypertensive urgency  Pulmonary nodule  Plan  Continue cardiopulmonary monitoring  Imdur 30 Mg daily  Hold losartan 50 Mg p.o. daily  Pressure improved  On labetalol IV as needed  Avoid hypoperfusion, hypotension or hypertension either  Education counseling  Diet exercise  Supportive care  To follow pulmonology outpatient for pulmonary nodule  On aspirin Plavix for recent stroke  Atorvastatin 80 Mg daily for hyperlipidemia  Norco for headache  Symptomatic management  Morbid appropriate for anxiety  Fluoxetine for depression  Lovenox 40 Mg subcu daily for DVT prophylaxis  To follow vascular outpatient for carotid stenosis to consider carotid endarterectomy  Famotidine for GI prophylaxis  DC when symptoms improved and vitals stable  Currently hemodynamically stable protecting airways, maintain ABCs and watch hemodynamics                                         Isis Barth MD

## 2024-11-16 ENCOUNTER — PHARMACY VISIT (OUTPATIENT)
Dept: PHARMACY | Facility: CLINIC | Age: 64
End: 2024-11-16
Payer: COMMERCIAL

## 2024-11-16 VITALS
RESPIRATION RATE: 16 BRPM | OXYGEN SATURATION: 95 % | SYSTOLIC BLOOD PRESSURE: 118 MMHG | HEIGHT: 62 IN | HEART RATE: 59 BPM | DIASTOLIC BLOOD PRESSURE: 83 MMHG | WEIGHT: 163 LBS | BODY MASS INDEX: 30 KG/M2 | TEMPERATURE: 97.9 F

## 2024-11-16 PROBLEM — I10 HTN (HYPERTENSION), BENIGN: Status: RESOLVED | Noted: 2024-11-14 | Resolved: 2024-11-16

## 2024-11-16 LAB
ANION GAP SERPL CALC-SCNC: 10 MMOL/L (ref 10–20)
BUN SERPL-MCNC: 21 MG/DL (ref 6–23)
CALCIUM SERPL-MCNC: 8.8 MG/DL (ref 8.6–10.3)
CHLORIDE SERPL-SCNC: 105 MMOL/L (ref 98–107)
CO2 SERPL-SCNC: 26 MMOL/L (ref 21–32)
CREAT SERPL-MCNC: 1.01 MG/DL (ref 0.5–1.05)
EGFRCR SERPLBLD CKD-EPI 2021: 62 ML/MIN/1.73M*2
ERYTHROCYTE [DISTWIDTH] IN BLOOD BY AUTOMATED COUNT: 14.4 % (ref 11.5–14.5)
GLUCOSE SERPL-MCNC: 94 MG/DL (ref 74–99)
HCT VFR BLD AUTO: 39.5 % (ref 36–46)
HGB BLD-MCNC: 12.6 G/DL (ref 12–16)
MCH RBC QN AUTO: 29.7 PG (ref 26–34)
MCHC RBC AUTO-ENTMCNC: 31.9 G/DL (ref 32–36)
MCV RBC AUTO: 93 FL (ref 80–100)
NRBC BLD-RTO: 0 /100 WBCS (ref 0–0)
PLATELET # BLD AUTO: 208 X10*3/UL (ref 150–450)
POTASSIUM SERPL-SCNC: 4.2 MMOL/L (ref 3.5–5.3)
RBC # BLD AUTO: 4.24 X10*6/UL (ref 4–5.2)
SODIUM SERPL-SCNC: 137 MMOL/L (ref 136–145)
WBC # BLD AUTO: 8.1 X10*3/UL (ref 4.4–11.3)

## 2024-11-16 PROCEDURE — 99239 HOSP IP/OBS DSCHRG MGMT >30: CPT | Performed by: HOSPITALIST

## 2024-11-16 PROCEDURE — 80048 BASIC METABOLIC PNL TOTAL CA: CPT | Performed by: HOSPITALIST

## 2024-11-16 PROCEDURE — RXMED WILLOW AMBULATORY MEDICATION CHARGE

## 2024-11-16 PROCEDURE — 99254 IP/OBS CNSLTJ NEW/EST MOD 60: CPT | Performed by: STUDENT IN AN ORGANIZED HEALTH CARE EDUCATION/TRAINING PROGRAM

## 2024-11-16 PROCEDURE — 2500000001 HC RX 250 WO HCPCS SELF ADMINISTERED DRUGS (ALT 637 FOR MEDICARE OP): Performed by: HOSPITALIST

## 2024-11-16 PROCEDURE — 2500000002 HC RX 250 W HCPCS SELF ADMINISTERED DRUGS (ALT 637 FOR MEDICARE OP, ALT 636 FOR OP/ED): Performed by: HOSPITALIST

## 2024-11-16 PROCEDURE — 2500000004 HC RX 250 GENERAL PHARMACY W/ HCPCS (ALT 636 FOR OP/ED): Performed by: HOSPITALIST

## 2024-11-16 PROCEDURE — 85027 COMPLETE CBC AUTOMATED: CPT | Performed by: HOSPITALIST

## 2024-11-16 PROCEDURE — G0378 HOSPITAL OBSERVATION PER HR: HCPCS

## 2024-11-16 PROCEDURE — 36415 COLL VENOUS BLD VENIPUNCTURE: CPT | Performed by: HOSPITALIST

## 2024-11-16 RX ORDER — ISOSORBIDE MONONITRATE 30 MG/1
30 TABLET, EXTENDED RELEASE ORAL DAILY
Qty: 15 TABLET | Refills: 0 | Status: SHIPPED | OUTPATIENT
Start: 2024-11-17 | End: 2024-11-20 | Stop reason: SDUPTHER

## 2024-11-16 NOTE — DISCHARGE SUMMARY
Discharge Diagnosis  HTN (hypertension), benign    Issues Requiring Follow-Up  PCP, vascular, neurology, cardiology    Discharge Meds     Medication List      START taking these medications     isosorbide mononitrate ER 30 mg 24 hr tablet; Commonly known as: Imdur;   Take 1 tablet (30 mg) by mouth once daily for 15 days. Do not crush or   chew.; Start taking on: November 17, 2024     CONTINUE taking these medications     acetaminophen 500 mg tablet; Commonly known as: Tylenol   aspirin 81 mg EC tablet   atorvastatin 80 mg tablet; Commonly known as: Lipitor; TAKE 1 TABLET BY   MOUTH EVERY DAY AT BEDTIME   buPROPion  mg 24 hr tablet; Commonly known as: Wellbutrin XL; Take   1 tablet (150 mg) by mouth once daily in the morning. Do not crush, chew,   or split.   cetirizine 10 mg tablet; Commonly known as: ZyrTEC; Take 1 tablet (10   mg) by mouth once daily.   clopidogrel 75 mg tablet; Commonly known as: Plavix; Take 1 tablet (75   mg) by mouth once daily for 21 doses.   FLUoxetine 40 mg capsule; Commonly known as: PROzac; Take 1 capsule (40   mg) by mouth once daily.   fluticasone 50 mcg/actuation nasal spray; Commonly known as: Flonase;   Administer 1 spray into each nostril once daily. Shake gently. Before   first use, prime pump. After use, clean tip and replace cap.   gabapentin 300 mg capsule; Commonly known as: Neurontin; Take 1 capsule   (300 mg) by mouth 3 times a day.   levothyroxine 88 mcg tablet; Commonly known as: Synthroid, Levoxyl; Take   1 tablet (88 mcg) by mouth once daily.   tiZANidine 4 mg tablet; Commonly known as: Zanaflex; Take 1 tablet (4   mg) by mouth every 6 hours if needed for muscle spasms.   traMADol 50 mg tablet; Commonly known as: Ultram; Take 1 tablet (50 mg)   by mouth every 6 hours if needed for moderate pain (4 - 6) for up to 2   days.     STOP taking these medications     losartan 50 mg tablet; Commonly known as: Cozaar       Test Results Pending At Discharge  Pending Labs        No current pending labs.            Hospital Course     64-year-old female with history of  CVA  Hypertension  Hyperlipidemia arthritis  CPAP  Depression anxiety  Carotid stenosis  History of smoking  Hypothyroidism  Possible GERD versus peptic ulcer disease per the patient has been presented with  Headache  Hypertensive urgency  Pulmonary nodule  Plan  Continued cardiopulmonary monitoring  Switched from losartan to Imdur 30 Mg daily, at this point pressure is much better and headache relieved  Also given Norco which helped her a lot  She feels much better now and hemodynamically stable clinically doing better and patient would like to go home  Asymptomatic  Followed vitals closely with daily labs CBC BMP  Education counseling and supportive care performed  Diet exercise recommended  Advised to follow pulmonology outpatient for pulmonary nodule  On aspirin Plavix for recent CVA and carotid stenosis, scheduled to see vascular may likely require endarterectomy  On high intensity statins atorvastatin 80 Mg daily  Famotidine for GI prophylaxis while here  Can resume home medicines and ongoing reconciliation will be helpful to manage her blood pressure and other medical conditions as stated above  Stable for DC at this point in time, also advised to keep track of blood pressure log at home and discussed with PCP if any further adjustments required.  And to return to ER if pressure is elevated and uncontrolled or if it drops to systolic below  and not take pressure medicines in that situation.  To call PCP or return to ER with any unusual or worsening symptoms alarming for stroke or emergency  Pertinent Physical Exam At Time of Discharge  Physical Exam  General Appearance: AAO x 3,  Skin: skin color pink, warm, and dry; no suspicious rashes or lesions  Eyes : PERRL, EOM's intact  ENT: mucous membranes pink and moist  Neck: normocephalic  Respiratory: lungs clear to auscultation anteriorly; no wheezing, rhonchi, or  crackles.   Heart: regular rate and rhythm.   Abdomen: Nondistended, positive bowel sounds x4, soft,  nontender  Extremities: no edema   Peripheral pulses: normal x4 extremities  Neuro: alert, coherent and conversant, no focal motor deficits  Outpatient Follow-Up  Future Appointments   Date Time Provider Department Center   11/20/2024  1:40 PM Manisha Singh DO [id number]   11/20/2024  3:30 PM Kimberlee Saxena MD [id number] [facility]     Time to dc > 35 mins       Isis Barth MD

## 2024-11-16 NOTE — PROGRESS NOTES
Subjective Data:  Patient reports feeling well, no new adverse events overnight. Patient denies any chest pain, shortness of breath, palpitations, dizziness or syncope. Patient is hemodynamically stable.     Overnight Events:    No     Objective Data:  Last Recorded Vitals:  Vitals:    11/15/24 0915 11/15/24 1521 11/15/24 2100 11/16/24 0742   BP: 98/60 102/60 116/73 118/83   BP Location:  Right arm Left arm Left arm   Patient Position:  Lying Lying Lying   Pulse: 76 65 66 59   Resp:  16 16 16   Temp:  36.3 °C (97.4 °F) 36.8 °C (98.2 °F) 36.6 °C (97.9 °F)   TempSrc:  Temporal Temporal Temporal   SpO2:  95% 93% 95%   Weight:       Height:           Last Labs:  CBC - 11/16/2024:  4:10 AM  8.1 12.6 208    39.5      CMP - 11/16/2024:  4:10 AM  8.8 6.3 20 --- 0.6   _ 3.8 22 93      PTT - 11/11/2024:  6:18 PM  0.9   10.9 28     TROPHS   Date/Time Value Ref Range Status   11/14/2024 04:18 PM 4 0 - 13 ng/L Final   11/14/2024 03:19 PM 3 0 - 13 ng/L Final   11/11/2024 06:18 PM 4 0 - 13 ng/L Final     BNP   Date/Time Value Ref Range Status   04/26/2023 12:57 PM 62 0 - 99 pg/mL Final     Comment:     .  <100 pg/mL - Heart failure unlikely  100-299 pg/mL - Intermediate probability of acute heart  .               failure exacerbation. Correlate with clinical  .               context and patient history.    >=300 pg/mL - Heart Failure likely. Correlate with clinical  .               context and patient history.  BNP testing is performed using different testing   methodology at [facility] than at other   [facility] hospitals. Direct result comparisons should   only be made within the same method.       HGBA1C   Date/Time Value Ref Range Status   11/12/2024 05:59 AM 5.8 See comment % Final     LDLCALC   Date/Time Value Ref Range Status   11/12/2024 05:59 AM 91 <=99 mg/dL Final     Comment:                                 Near   Borderline      AGE      Desirable  Optimal    High     High     Very High     0-19 Y     0 - 109      ---    110-129   >/= 130     ----    20-24 Y     0 - 119     ---    120-159   >/= 160     ----      >24 Y     0 -  99   100-129  130-159   160-189     >/=190       VLDL   Date/Time Value Ref Range Status   11/12/2024 05:59 AM 42 0 - 40 mg/dL Final   07/07/2021 09:30 AM 31 0 - 40 mg/dL Final   11/20/2019 05:32 AM 31 0 - 40 mg/dL Final      Last I/O:  I/O last 3 completed shifts:  In: 1190 (16.1 mL/kg) [P.O.:1190]  Out: - (0 mL/kg)   Weight: 73.9 kg     Past Cardiology Tests (Last 3 Years):  EKG:  ECG 12 lead 11/14/2024      ECG 12 lead       ECG 12 Lead 05/26/2024    Echo:  Transthoracic Echo (TTE) Complete 11/13/2024    Ejection Fractions:  EF   Date/Time Value Ref Range Status   11/13/2024 09:23 AM 60 %      Cath:  No results found for this or any previous visit from the past 1095 days.    Stress Test:  No results found for this or any previous visit from the past 1095 days.    Cardiac Imaging:  No results found for this or any previous visit from the past 1095 days.      Inpatient Medications:  Scheduled medications   Medication Dose Route Frequency    aspirin  81 mg oral Nightly    atorvastatin  80 mg oral Nightly    buPROPion XL  150 mg oral q AM    clopidogrel  75 mg oral Daily    enoxaparin  40 mg subcutaneous q24h    famotidine  20 mg oral Daily    FLUoxetine  40 mg oral Daily    gabapentin  300 mg oral TID    isosorbide mononitrate ER  30 mg oral Daily    levothyroxine  88 mcg oral Daily    [Held by provider] losartan  50 mg oral Daily    polyethylene glycol  17 g oral Daily     PRN medications   Medication    acetaminophen    Or    acetaminophen    Or    acetaminophen    acetaminophen    Or    acetaminophen    Or    acetaminophen    HYDROcodone-acetaminophen    labetaloL    ondansetron ODT    Or    ondansetron    oxyCODONE     Continuous Medications   Medication Dose Last Rate       Physical Exam:  General: alert, oriented and in no acute distress  HEENT: NC/AT; EOMI; PERRLA, external ear is  normal  Neck: supple; trachea midline; no masses; no JVD  Chest: clear breath sounds bilaterally; no wheezing  Cardio: regular rhythm, S1S2 normal, no murmurs  Abdomen: Soft, non-tender, non-distension, no organomegaly  Extremities: no clubbing/cyanosis/edema  Neuro: Grossly intact     Psychiatric: Normal mood and affect      Assessment/Plan     Mrs. Julia Mcguire is a 64 y.o. former smoker female being consulted by the Cardiology team for TIA. Patient with prior medical history significant for bilateral carotid stenosis, non-obstructive CAD, hypertension, hyperlipidemia, shortness of breath, anxiety/depression, hypothyroidism, diastolic congestive heart failure, depression, anxiety, obstructive sleep apnea, former nicotine dependence, rib fractures secondary to a fall, fibromyalgia, cervical vertebral degenerative disease with varying degrees of spinal canal and neural foraminal narrowing s/p discectomies and fusion at C5-T1 levels, obesity, GERD, insomnia, and BPPV. She presented to ED [facility] on [facility]/11/2024 complaining of headache and difficulty using the right arm. She endorsed progressively worsening weakness in her right arm. She felt that she was not able to hold up anything. She did also have some tingling and numbness in her right hand. Denies having blurry vision, weakness in her lower extremities, speech changes, lightheadedness, nausea or vomiting. She said that she is supposed to be taking aspirin on a daily basis but she only takes it sometimes, but she is compliant with statins. Pertinent findings on blood workup showed acute kidney injury and leukocytosis. CT of the head came back grossly within normal limits. Patient has been admitted for clinical compensation.     Assessment     # Heart Failure with Preserved LV systolic function  - Keep daily weights. Patient's admission weight is 164 lb.  - Low sodium diet (2g).  - 2000mL fluid restriction.  - Strict I&Os.  - Electrolyte control and  daily BMP including magnesium.  - General recommendations for heart failure, including low-sodium diet, fluid restriction, daily weight and adherence to medication.   - Keep Losartan 50mg daily, Imdur 30mg daily.  - Please refer the patient to Cardiology Clinic upon discharge.      # TIA / Bilateral carotid stenosis  - R ICA 50-69% stenosis and Left ICA > 70% stenosis.  - Keep ASA, Clopidogrel, high intensity statins.  - Follow up with Neurology team and Vascular team.     # Hypertension  - Controlled blood pressure.  - Keep current medications including Losartan 50mg daily.  - Patient counseled to keep a healthy lifestyle including regular exercise and low-sodium diet.  - Recommended home blood pressure monitoring.  - Goal of BP < 130/80mmHg.      # Hyperlipidemia  - Controlled by PCP.  - Keep home medication with Atorvastatin 80mg daily.  - Counseled on healthy diet and regular exercise.      # Hypothyroidism  - Keep Levothyroxine 88mcg  daily.     Thank you for allowing me to participate in the care of this patient. Please reach me out if you have any questions or if you need any clarifications regarding the patient's care.      Peripheral IV 11/14/24 20 G Left Hand (Active)   Site Assessment Clean;Dry;Intact 11/16/24 0900   Dressing Status Clean;Dry 11/16/24 0900   Number of days: 2       Code Status:  Full Code    Ramiro Adler MD  Cardiology

## 2024-11-18 ENCOUNTER — TELEPHONE (OUTPATIENT)
Dept: PRIMARY CARE | Facility: CLINIC | Age: 64
End: 2024-11-18
Payer: COMMERCIAL

## 2024-11-18 ENCOUNTER — PATIENT OUTREACH (OUTPATIENT)
Dept: PRIMARY CARE | Facility: CLINIC | Age: 64
End: 2024-11-18
Payer: COMMERCIAL

## 2024-11-18 DIAGNOSIS — I65.22 STENOSIS OF LEFT CAROTID ARTERY: Primary | ICD-10-CM

## 2024-11-18 ASSESSMENT — ENCOUNTER SYMPTOMS
PALPITATIONS: 0
FOCAL WEAKNESS: 1
FEVER: 0
NAUSEA: 0
VERTIGO: 0
MEMORY LOSS: 0
CLUMSINESS: 1
FOCAL SENSORY LOSS: 1
NECK PAIN: 1
VOMITING: 0
BOWEL INCONTINENCE: 0
HEADACHES: 1
BACK PAIN: 1
NEUROLOGIC COMPLAINT: 1
CONFUSION: 0
VISUAL CHANGE: 0
ALTERED MENTAL STATUS: 0
SHORTNESS OF BREATH: 0
WEAKNESS: 0
LOSS OF BALANCE: 0
SLURRED SPEECH: 0
LIGHT-HEADEDNESS: 0
FATIGUE: 1
NEAR-SYNCOPE: 0
DIAPHORESIS: 0
ABDOMINAL PAIN: 0
AURA: 0
DIZZINESS: 0

## 2024-11-18 NOTE — PROGRESS NOTES
Discharge Facility:[facility]  Discharge Diagnosis:HTN, benign  Admission Date:11/14/24  Discharge Date:11/16/24     PCP Appointment Date:11/20/24  Specialist Appointment Date:Cardiology 11/20/24, Vascular TBD, Pulmonary TBD, Neurology TBD   Hospital Encounter and Summary Linked: Yes  See discharge assessment below for further details    Medications  Medications reviewed with patient/caregiver?: Yes (11/18/2024 10:55 AM)  Is the patient having any side effects they believe may be caused by any medication additions or changes?: No (11/18/2024 10:55 AM)  Does the patient have all medications ordered at discharge?: Yes (11/18/2024 10:55 AM)  Care Management Interventions: Provided patient education (11/18/2024 10:55 AM)  Prescription Comments: Imdur (11/18/2024 10:55 AM)  Is the patient taking all medications as directed (includes completed medication regime)?: Yes (11/18/2024 10:55 AM)  Care Management Interventions: Provided patient education (11/18/2024 10:55 AM)  Medication Comments: Stop Losartan (11/18/2024 10:55 AM)    Appointments  Does the patient have a primary care provider?: Yes (11/18/2024 10:55 AM)  Care Management Interventions: Verified appointment date/time/provider (11/18/2024 10:55 AM)  Has the patient kept scheduled appointments due by today?: Yes (11/18/2024 10:55 AM)  Care Management Interventions: Advised patient to keep appointment; Educated on importance of keeping appointment (11/18/2024 10:55 AM)    Self Management  Has home health visited the patient within 72 hours of discharge?: Not applicable (11/18/2024 10:55 AM)  What Durable Medical Equipment (DME) was ordered?: N/A (11/18/2024 10:55 AM)    Patient Teaching  Does the patient have access to their discharge instructions?: Yes (11/18/2024 10:55 AM)  Care Management Interventions: Reviewed instructions with patient (11/18/2024 10:55 AM)  What is the patient's perception of their health status since discharge?: Same (11/18/2024 10:55 AM)  Is  the patient/caregiver able to teach back the hierarchy of who to call/visit for symptoms/problems? PCP, Specialist, Home Health nurse, Urgent Care, ED, 911: Yes (11/18/2024 10:55 AM)    Wrap Up  Wrap Up Additional Comments: Patient is doing about the same today. Blood pressure is still fluctuating and she is still having headsches. Hospital stop Losartan and started Imdur. Pcp and Cardiology notified. Patient is scheduled to see both 11/20/24 (11/18/2024 10:55 AM)

## 2024-11-18 NOTE — TELEPHONE ENCOUNTER
Tuesday is requesting a referral to Dr. Acosta-Vascular Surgeon with [facility] in [place].  She has an appt with Dr. Christine on 11/20 but she does not want to have surgery in [place]. She will still see her just to get her opinion.  This is for Left Carotid Artery.

## 2024-11-20 ENCOUNTER — PHARMACY VISIT (OUTPATIENT)
Dept: PHARMACY | Facility: CLINIC | Age: 64
End: 2024-11-20
Payer: MEDICAID

## 2024-11-20 ENCOUNTER — APPOINTMENT (OUTPATIENT)
Dept: PRIMARY CARE | Facility: CLINIC | Age: 64
End: 2024-11-20
Payer: COMMERCIAL

## 2024-11-20 VITALS
SYSTOLIC BLOOD PRESSURE: 135 MMHG | HEIGHT: 62 IN | DIASTOLIC BLOOD PRESSURE: 82 MMHG | HEART RATE: 83 BPM | WEIGHT: 161 LBS | BODY MASS INDEX: 29.63 KG/M2

## 2024-11-20 DIAGNOSIS — K21.9 GASTROESOPHAGEAL REFLUX DISEASE WITHOUT ESOPHAGITIS: ICD-10-CM

## 2024-11-20 DIAGNOSIS — I10 BENIGN ESSENTIAL HYPERTENSION: ICD-10-CM

## 2024-11-20 DIAGNOSIS — G43.119 INTRACTABLE MIGRAINE WITH AURA WITHOUT STATUS MIGRAINOSUS: ICD-10-CM

## 2024-11-20 DIAGNOSIS — F41.9 ANXIETY: Primary | ICD-10-CM

## 2024-11-20 DIAGNOSIS — E03.9 ACQUIRED HYPOTHYROIDISM: ICD-10-CM

## 2024-11-20 DIAGNOSIS — M48.02 CERVICAL SPINAL STENOSIS: ICD-10-CM

## 2024-11-20 DIAGNOSIS — I63.9 CEREBROVASCULAR ACCIDENT (CVA), UNSPECIFIED MECHANISM (MULTI): ICD-10-CM

## 2024-11-20 DIAGNOSIS — I10 HTN (HYPERTENSION), BENIGN: ICD-10-CM

## 2024-11-20 DIAGNOSIS — M79.7 FIBROMYALGIA: ICD-10-CM

## 2024-11-20 DIAGNOSIS — Z91.89 10 YEAR RISK OF MI OR STROKE < 7.5%: ICD-10-CM

## 2024-11-20 DIAGNOSIS — M50.30 DEGENERATION OF CERVICAL INTERVERTEBRAL DISC: ICD-10-CM

## 2024-11-20 DIAGNOSIS — I65.22 STENOSIS OF LEFT CAROTID ARTERY: ICD-10-CM

## 2024-11-20 PROCEDURE — 99496 TRANSJ CARE MGMT HIGH F2F 7D: CPT | Performed by: INTERNAL MEDICINE

## 2024-11-20 PROCEDURE — 3075F SYST BP GE 130 - 139MM HG: CPT | Performed by: INTERNAL MEDICINE

## 2024-11-20 PROCEDURE — 1036F TOBACCO NON-USER: CPT | Performed by: INTERNAL MEDICINE

## 2024-11-20 PROCEDURE — RXMED WILLOW AMBULATORY MEDICATION CHARGE

## 2024-11-20 PROCEDURE — 3079F DIAST BP 80-89 MM HG: CPT | Performed by: INTERNAL MEDICINE

## 2024-11-20 PROCEDURE — 3008F BODY MASS INDEX DOCD: CPT | Performed by: INTERNAL MEDICINE

## 2024-11-20 RX ORDER — ISOSORBIDE MONONITRATE 30 MG/1
30 TABLET, EXTENDED RELEASE ORAL DAILY
Qty: 30 TABLET | Refills: 3 | Status: SHIPPED | OUTPATIENT
Start: 2024-11-20

## 2024-11-20 RX ORDER — LOSARTAN POTASSIUM 25 MG/1
25 TABLET ORAL DAILY
Qty: 30 TABLET | Refills: 11 | Status: SHIPPED | OUTPATIENT
Start: 2024-11-20 | End: 2025-11-20

## 2024-11-20 RX ORDER — LORAZEPAM 0.5 MG/1
0.5 TABLET ORAL 2 TIMES DAILY PRN
Qty: 10 TABLET | Refills: 0 | Status: SHIPPED | OUTPATIENT
Start: 2024-11-20 | End: 2025-02-18

## 2024-11-20 RX ORDER — HYDROXYZINE PAMOATE 25 MG/1
25 CAPSULE ORAL 3 TIMES DAILY PRN
Qty: 30 CAPSULE | Refills: 0 | Status: SHIPPED | OUTPATIENT
Start: 2024-11-20 | End: 2024-11-30

## 2024-11-20 RX ORDER — PROMETHAZINE HYDROCHLORIDE 25 MG/1
25 TABLET ORAL EVERY 6 HOURS PRN
Qty: 30 TABLET | Refills: 0 | Status: SHIPPED | OUTPATIENT
Start: 2024-11-20 | End: 2024-11-28

## 2024-11-20 RX ORDER — HYDROCODONE BITARTRATE AND ACETAMINOPHEN 5; 325 MG/1; MG/1
1 TABLET ORAL EVERY 6 HOURS PRN
Qty: 20 TABLET | Refills: 0 | Status: SHIPPED | OUTPATIENT
Start: 2024-11-20 | End: 2024-11-27

## 2024-11-20 ASSESSMENT — ENCOUNTER SYMPTOMS
FATIGUE: 1
BOWEL INCONTINENCE: 0
NEUROLOGIC COMPLAINT: 1
LIGHT-HEADEDNESS: 0
NECK PAIN: 1
FOCAL SENSORY LOSS: 1
ALTERED MENTAL STATUS: 0
NAUSEA: 0
CLUMSINESS: 1
VERTIGO: 0
DIAPHORESIS: 0
MEMORY LOSS: 0
LOSS OF BALANCE: 0
HEADACHES: 1
NEAR-SYNCOPE: 0
DIZZINESS: 0
BACK PAIN: 1
FOCAL WEAKNESS: 1
ABDOMINAL PAIN: 0
SLURRED SPEECH: 0
SHORTNESS OF BREATH: 0
PALPITATIONS: 0
CONFUSION: 0
VISUAL CHANGE: 0
AURA: 0
VOMITING: 0
WEAKNESS: 0
FEVER: 0

## 2024-11-20 NOTE — PROGRESS NOTES
Subjective   Patient ID: Julia Mcguire is a 64 y.o. female who presents for Follow-up (3 month) and Hospital Follow-up (11/14/24).  Acute Neurological Problem  The patient's primary symptoms include clumsiness, focal sensory loss and focal weakness. The patient's pertinent negatives include no altered mental status, loss of balance, memory loss, near-syncope, slurred speech, syncope, visual change or weakness. This is a new problem. The current episode started in the past 7 days. The neurological problem developed insidiously. The problem has been gradually improving since onset. There was right-sided focality noted. Associated symptoms include back pain, fatigue, headaches and neck pain. Pertinent negatives include no abdominal pain, auditory change, aura, bladder incontinence, bowel incontinence, chest pain, confusion, diaphoresis, dizziness, fever, light-headedness, nausea, palpitations, shortness of breath, vertigo or vomiting. Past treatments include neck support and position change.     Patient is here today for Hospital follow up     Pt reports that last week she was standing in the kitchen doing the dishes, she was suddenly unable to make her right side do what she wanted to do. She repots hat she had had a headache for a week and a half prior to that but she had not checked her blood pressure. Her daughter came home and checked her blood pressure and it was very high, so she went to the Hospital. She was admitted.     She has had issues with her blood pressure since being discharged.   She is having a lot of anxiety, I think she is also working herself up and making her blood pressure more elevated.     Reports that the only thing that helped her headache was norco in the hospital. She used to get migraines when she was younger.        She has an appt with Dr Acosta for her carotid artery stenosis.     Review of Systems   Constitutional:  Positive for fatigue. Negative for diaphoresis and fever.  "  Respiratory:  Negative for shortness of breath.    Cardiovascular:  Negative for chest pain, palpitations and near-syncope.   Gastrointestinal:  Negative for abdominal pain, bowel incontinence, nausea and vomiting.   Genitourinary:  Negative for bladder incontinence.   Musculoskeletal:  Positive for back pain and neck pain.   Neurological:  Positive for focal weakness and headaches. Negative for dizziness, vertigo, syncope, weakness, light-headedness and loss of balance.   Psychiatric/Behavioral:  Negative for confusion and memory loss.        Objective   /82   Pulse 83   Ht 1.575 m (5' 2\")   Wt 73 kg (161 lb)   BMI 29.45 kg/m²     Physical Exam  Constitutional:       General: She is not in acute distress.     Appearance: Normal appearance.   HENT:      Head: Normocephalic.      Nose: Nose normal.      Mouth/Throat:      Pharynx: No oropharyngeal exudate.   Eyes:      General:         Right eye: No discharge.         Left eye: No discharge.      Extraocular Movements: Extraocular movements intact.      Pupils: Pupils are equal, round, and reactive to light.   Cardiovascular:      Rate and Rhythm: Normal rate and regular rhythm.      Heart sounds: No murmur heard.     No gallop.   Pulmonary:      Effort: Pulmonary effort is normal. No respiratory distress.      Breath sounds: Normal breath sounds. No wheezing.   Musculoskeletal:         General: No swelling. Normal range of motion.   Skin:     General: Skin is warm and dry.      Coloration: Skin is not jaundiced.   Neurological:      General: No focal deficit present.      Mental Status: She is alert and oriented to person, place, and time.      Cranial Nerves: No cranial nerve deficit.   Psychiatric:         Mood and Affect: Mood normal.         Behavior: Behavior normal.           Assessment/Plan   Problem List Items Addressed This Visit       Anxiety - Primary    Relevant Medications    LORazepam (Ativan) 0.5 mg tablet    Benign essential hypertension "    Cervical spinal stenosis    GERD (gastroesophageal reflux disease)    Hypothyroidism    Fibromyalgia    Degeneration of cervical intervertebral disc    10 year risk of MI or stroke < 7.5%     Other Visit Diagnoses       HTN (hypertension), benign        Relevant Medications    isosorbide mononitrate ER (Imdur) 30 mg 24 hr tablet    losartan (Cozaar) 25 mg tablet    Intractable migraine with aura without status migrainosus        Relevant Medications    HYDROcodone-acetaminophen (Norco) 5-325 mg tablet    promethazine (Phenergan) 25 mg tablet    hydrOXYzine pamoate (VistariL) 25 mg capsule    Cerebrovascular accident (CVA), unspecified mechanism (Multi)        Stenosis of left carotid artery              CVA with right sided sudden weakness, hypertensive urgency, carotid stenosis left   - has appt with Dr Acosta vascular [facility] to discuss carotid endarterectomy   - carotid us showed > 70% on us, CTA showed left internal carotid artery 70% stenosis in the bulb with irregular plaque vs small ulcerations    -CT head negative  - continue imdur 30mg po daily   - will add losartan 25mg po daily back   - can take additional if bp > 140/90     2. Intractable headache, hx of migraines   - given samples of nurtec and ubrelvy to try, took 1 nurtec in the office   - sent norco   - I have personally reviewed this patient's [place]AR[place] report and found it to be appropriate. This has cassandra uploaded into the medical record. I have considered the risks of abuse, addiction, dependency and diversion and feel that it is clinically appropriate for this patient to be prescribed this controlled substance medication.   - consider mri brain   - sent phenergan     3. Anxiety   - advised not to take lorazpem with norco as can cause respiratory depression  - buspar did not help in the past   - will try vistaril   - sent short course of ativan   - I have personally reviewed this patient's OARRS report and found it to be appropriate. This has cassandra  uploaded into the medical record. I have considered the risks of abuse, addiction, dependency and diversion and feel that it is clinically appropriate for this patient to be prescribed this controlled substance medication.     Advised pt that  I will be leaving   In March 25, will see her back In 2 weeks.   Final diagnoses:   [I10] HTN (hypertension), benign   [F41.9] Anxiety   [G43.119] Intractable migraine with aura without status migrainosus   [I10] Benign essential hypertension   [Z91.89] 10 year risk of MI or stroke < 7.5%   [E03.9] Acquired hypothyroidism   [K21.9] Gastroesophageal reflux disease without esophagitis   [M79.7] Fibromyalgia   [M48.02] Cervical spinal stenosis   [M50.30] Degeneration of cervical intervertebral disc   [I63.9] Cerebrovascular accident (CVA), unspecified mechanism (Multi)   [I65.22] Stenosis of left carotid artery

## 2024-11-22 ENCOUNTER — PATIENT OUTREACH (OUTPATIENT)
Dept: PRIMARY CARE | Facility: CLINIC | Age: 64
End: 2024-11-22
Payer: COMMERCIAL

## 2024-11-22 NOTE — PROGRESS NOTES
Call regarding appt. with PCP on 11/20/24 after hospitalization.  At time of outreach call the patient feels as if their condition is the same since last visit. She is still having headaches and struggling with her blood pressure. She saw vascular yesterday and is waiting for surgery next month. Reviewed the PCP appointment with the pt and addressed any questions or concerns.

## 2024-11-30 ENCOUNTER — PHARMACY VISIT (OUTPATIENT)
Dept: PHARMACY | Facility: CLINIC | Age: 64
End: 2024-11-30

## 2024-12-01 ENCOUNTER — PATIENT MESSAGE (OUTPATIENT)
Dept: PRIMARY CARE | Facility: CLINIC | Age: 64
End: 2024-12-01
Payer: COMMERCIAL

## 2024-12-01 DIAGNOSIS — M48.02 CERVICAL SPINAL STENOSIS: Primary | ICD-10-CM

## 2024-12-01 DIAGNOSIS — I65.23 BILATERAL CAROTID ARTERY STENOSIS: ICD-10-CM

## 2024-12-02 PROCEDURE — RXMED WILLOW AMBULATORY MEDICATION CHARGE

## 2024-12-02 RX ORDER — CLOPIDOGREL BISULFATE 75 MG/1
75 TABLET ORAL DAILY
Qty: 30 TABLET | Refills: 5 | Status: SHIPPED | OUTPATIENT
Start: 2024-12-02 | End: 2025-05-31

## 2024-12-07 ENCOUNTER — PHARMACY VISIT (OUTPATIENT)
Dept: PHARMACY | Facility: CLINIC | Age: 64
End: 2024-12-07
Payer: MEDICAID

## 2024-12-07 PROCEDURE — RXMED WILLOW AMBULATORY MEDICATION CHARGE

## 2024-12-16 ENCOUNTER — PATIENT OUTREACH (OUTPATIENT)
Dept: PRIMARY CARE | Facility: CLINIC | Age: 64
End: 2024-12-16
Payer: COMMERCIAL

## 2024-12-16 PROCEDURE — RXMED WILLOW AMBULATORY MEDICATION CHARGE

## 2024-12-16 NOTE — PROGRESS NOTES
Discharge Facility:[facility]  Discharge Diagnosis:Symptomatic carotid artery stenosis with infarction   Admission Date:12/12/24  Discharge Date:12/14/24     PCP Appointment Date:12/18/24  Specialist Appointment Date:TBD  Hospital Encounter and Summary Linked: Yes  See discharge assessment below for further details    Medications  Medications reviewed with patient/caregiver?: Not applicable (12/16/2024 11:21 AM)  Is the patient having any side effects they believe may be caused by any medication additions or changes?: No (12/16/2024 11:21 AM)  Does the patient have all medications ordered at discharge?: Not applicable (12/16/2024 11:21 AM)  Care Management Interventions: No intervention needed (12/16/2024 11:21 AM)  Prescription Comments: No new medications at discharge. (12/16/2024 11:21 AM)  Is the patient taking all medications as directed (includes completed medication regime)?: Yes (12/16/2024 11:21 AM)  Care Management Interventions: Provided patient education (12/16/2024 11:21 AM)  Medication Comments: No new medication at discharge (12/16/2024 11:21 AM)    Appointments  Does the patient have a primary care provider?: Yes (12/16/2024 11:21 AM)  Care Management Interventions: Verified appointment date/time/provider (12/16/2024 11:21 AM)  Has the patient kept scheduled appointments due by today?: Yes (12/16/2024 11:21 AM)  Care Management Interventions: Advised patient to keep appointment; Educated on importance of keeping appointment (12/16/2024 11:21 AM)    Self Management  Has home health visited the patient within 72 hours of discharge?: Not applicable (12/16/2024 11:21 AM)  What Durable Medical Equipment (DME) was ordered?: N/A (12/16/2024 11:21 AM)    Patient Teaching  Does the patient have access to their discharge instructions?: Yes (12/16/2024 11:21 AM)  Care Management Interventions: Reviewed instructions with patient (12/16/2024 11:21 AM)  What is the patient's perception of their health  status since discharge?: Same (12/16/2024 11:21 AM)  Is the patient/caregiver able to teach back the hierarchy of who to call/visit for symptoms/problems? PCP, Specialist, Home Health nurse, Urgent Care, ED, 911: Yes (12/16/2024 11:21 AM)    Wrap Up  Wrap Up Additional Comments: Patient is feeling the same as discharge still having some pain. Her vascular surgeon is calling in [place] to [facility]. She has a pcp follow up scheduled for 12/18/24. (12/16/2024 11:21 AM)

## 2024-12-17 ENCOUNTER — PHARMACY VISIT (OUTPATIENT)
Dept: PHARMACY | Facility: CLINIC | Age: 64
End: 2024-12-17
Payer: MEDICAID

## 2024-12-18 ENCOUNTER — TELEPHONE (OUTPATIENT)
Dept: PRIMARY CARE | Facility: CLINIC | Age: 64
End: 2024-12-18

## 2024-12-18 ENCOUNTER — APPOINTMENT (OUTPATIENT)
Dept: PRIMARY CARE | Facility: CLINIC | Age: 64
End: 2024-12-18
Payer: COMMERCIAL

## 2024-12-18 VITALS
SYSTOLIC BLOOD PRESSURE: 119 MMHG | HEIGHT: 62 IN | BODY MASS INDEX: 30.36 KG/M2 | HEART RATE: 76 BPM | WEIGHT: 165 LBS | DIASTOLIC BLOOD PRESSURE: 79 MMHG

## 2024-12-18 DIAGNOSIS — K21.9 GASTROESOPHAGEAL REFLUX DISEASE WITHOUT ESOPHAGITIS: ICD-10-CM

## 2024-12-18 DIAGNOSIS — I63.30 CEREBROVASCULAR ACCIDENT (CVA) DUE TO THROMBOSIS OF CEREBRAL ARTERY (MULTI): ICD-10-CM

## 2024-12-18 DIAGNOSIS — F41.9 ANXIETY: ICD-10-CM

## 2024-12-18 DIAGNOSIS — E78.00 HYPERCHOLESTEROLEMIA: ICD-10-CM

## 2024-12-18 DIAGNOSIS — I63.9 CEREBROVASCULAR ACCIDENT (CVA), UNSPECIFIED MECHANISM (MULTI): Primary | ICD-10-CM

## 2024-12-18 DIAGNOSIS — I10 BENIGN ESSENTIAL HYPERTENSION: Primary | ICD-10-CM

## 2024-12-18 DIAGNOSIS — E03.9 ACQUIRED HYPOTHYROIDISM: ICD-10-CM

## 2024-12-18 PROCEDURE — 3078F DIAST BP <80 MM HG: CPT | Performed by: INTERNAL MEDICINE

## 2024-12-18 PROCEDURE — 3008F BODY MASS INDEX DOCD: CPT | Performed by: INTERNAL MEDICINE

## 2024-12-18 PROCEDURE — 1036F TOBACCO NON-USER: CPT | Performed by: INTERNAL MEDICINE

## 2024-12-18 PROCEDURE — 3074F SYST BP LT 130 MM HG: CPT | Performed by: INTERNAL MEDICINE

## 2024-12-18 PROCEDURE — 99214 OFFICE O/P EST MOD 30 MIN: CPT | Performed by: INTERNAL MEDICINE

## 2024-12-18 ASSESSMENT — ENCOUNTER SYMPTOMS: FATIGUE: 1

## 2024-12-18 NOTE — TELEPHONE ENCOUNTER
Patient is wondering if you could order her Speech therapy and possible PT for right sided weakness from her stroke. She isn't sure if you will order this or if she needs to get it from her surgeon. Please advise, thanks!

## 2024-12-18 NOTE — PROGRESS NOTES
"Subjective   Patient ID: Julia Mcguire is a 64 y.o. female who presents for Follow-up (1 month follow up).  HPI  Patient is here today for 1 mo follow up     PT is sp right endaretectomy with Dr Acosta at [facility] on 12/12/2024. She woke up with right sided facial droop and more a stutter and halting speech, she had a repeat MRI due to concern for repeat scope and possible nerve damage post procedure, Rmi showed no acute findings, just old chronic stroke from previous.   On plavix and asa.     Review of Systems   Constitutional:  Positive for fatigue.   Neurological:         +right sided facial droop         Objective   /79   Pulse 76   Ht 1.575 m (5' 2\")   Wt 74.8 kg (165 lb)   BMI 30.18 kg/m²     Physical Exam  Constitutional:       General: She is not in acute distress.     Appearance: Normal appearance.   HENT:      Head: Normocephalic.      Nose: Nose normal.      Mouth/Throat:      Mouth: Mucous membranes are dry.      Pharynx: No oropharyngeal exudate.   Eyes:      General:         Right eye: No discharge.         Left eye: No discharge.      Extraocular Movements: Extraocular movements intact.      Pupils: Pupils are equal, round, and reactive to light.   Cardiovascular:      Rate and Rhythm: Normal rate and regular rhythm.      Heart sounds: No murmur heard.     No gallop.   Pulmonary:      Effort: Pulmonary effort is normal. No respiratory distress.      Breath sounds: Normal breath sounds. No wheezing.   Musculoskeletal:         General: No swelling. Normal range of motion.   Skin:     General: Skin is warm and dry.      Coloration: Skin is not jaundiced.      Comments: Significant bruising around neck and upper chest   Large bandage applied over left side of neck   Neurological:      Mental Status: She is alert and oriented to person, place, and time.      Cranial Nerves: Cranial nerve deficit present.      Comments: +right sided facial droop, stutter now when speaking    Psychiatric:   "       Mood and Affect: Mood normal.         Behavior: Behavior normal.           Assessment/Plan   Problem List Items Addressed This Visit       Anxiety    Benign essential hypertension - Primary    GERD (gastroesophageal reflux disease)    Hypercholesterolemia    Hypothyroidism    Cerebrovascular accident (CVA) due to thrombosis of cerebral artery (Multi)     CVA with right sided sudden weakness, hypertensive urgency, carotid stenosis left s/p endarterectomy  - carotid us showed > 70% on us, CTA showed left internal carotid artery 70% stenosis in the bulb with irregular plaque vs small ulcerations    -CT head negative  - continue imdur 30mg po daily   - s/p left carotid endarterectomy on 12/12/2024 with Dr Acosta, she had a right sided facial droop and a stutter after the surgery, she had a repeat MRI due to potential for repeat stroke, suspected to be lingual nerve damage  - continue losartan 25mg po daily back   - can take additional if bp > 140/90      2. Intractable headache, hx of migraines, headaches are improved   - possibly 2.2 elevated bp and recent stroke      3. Anxiety . Reports a little worse right now with more neurological symptoms post procedure   - buspar did not help in the past   -  vistaril prn     Advised pt that  I will be leaving   In March 25, will see her back In Feb 25     Final diagnoses:   [I10] Benign essential hypertension   [E78.00] Hypercholesterolemia   [E03.9] Acquired hypothyroidism   [K21.9] Gastroesophageal reflux disease without esophagitis   [F41.9] Anxiety   [I63.30] Cerebrovascular accident (CVA) due to thrombosis of cerebral artery (Multi)

## 2024-12-24 ENCOUNTER — PHARMACY VISIT (OUTPATIENT)
Dept: PHARMACY | Facility: CLINIC | Age: 64
End: 2024-12-24
Payer: MEDICAID

## 2024-12-24 PROCEDURE — RXMED WILLOW AMBULATORY MEDICATION CHARGE

## 2024-12-31 ENCOUNTER — PATIENT OUTREACH (OUTPATIENT)
Dept: PRIMARY CARE | Facility: CLINIC | Age: 64
End: 2024-12-31
Payer: COMMERCIAL

## 2024-12-31 NOTE — PROGRESS NOTES
Call regarding appt. with PCP on 12/18/24 after hospitalization.  At time of outreach call the patient feels as if their condition has improved since last visit.  Reviewed the PCP appointment with the pt and addressed any questions or concerns.

## 2025-01-01 PROCEDURE — RXMED WILLOW AMBULATORY MEDICATION CHARGE

## 2025-01-02 ENCOUNTER — PHARMACY VISIT (OUTPATIENT)
Dept: PHARMACY | Facility: CLINIC | Age: 65
End: 2025-01-02
Payer: COMMERCIAL

## 2025-01-15 ENCOUNTER — PHARMACY VISIT (OUTPATIENT)
Dept: PHARMACY | Facility: CLINIC | Age: 65
End: 2025-01-15
Payer: COMMERCIAL

## 2025-01-15 PROCEDURE — RXMED WILLOW AMBULATORY MEDICATION CHARGE

## 2025-01-31 ENCOUNTER — PATIENT OUTREACH (OUTPATIENT)
Dept: PRIMARY CARE | Facility: CLINIC | Age: 65
End: 2025-01-31
Payer: COMMERCIAL

## 2025-02-04 ENCOUNTER — PHARMACY VISIT (OUTPATIENT)
Dept: PHARMACY | Facility: CLINIC | Age: 65
End: 2025-02-04
Payer: COMMERCIAL

## 2025-02-04 PROCEDURE — RXMED WILLOW AMBULATORY MEDICATION CHARGE

## 2025-02-17 DIAGNOSIS — E66.9 OBESITY (BMI 30-39.9): ICD-10-CM

## 2025-02-17 DIAGNOSIS — H69.92 DYSFUNCTION OF LEFT EUSTACHIAN TUBE: ICD-10-CM

## 2025-02-18 ENCOUNTER — PHARMACY VISIT (OUTPATIENT)
Dept: PHARMACY | Facility: CLINIC | Age: 65
End: 2025-02-18
Payer: COMMERCIAL

## 2025-02-18 PROCEDURE — RXOTC WILLOW AMBULATORY OTC CHARGE

## 2025-02-18 PROCEDURE — RXMED WILLOW AMBULATORY MEDICATION CHARGE

## 2025-02-18 RX ORDER — CETIRIZINE HYDROCHLORIDE 10 MG/1
10 TABLET ORAL DAILY
Qty: 30 TABLET | Refills: 11 | Status: SHIPPED | OUTPATIENT
Start: 2025-02-18

## 2025-02-18 RX ORDER — BUPROPION HYDROCHLORIDE 150 MG/1
150 TABLET ORAL EVERY MORNING
Qty: 30 TABLET | Refills: 11 | Status: SHIPPED | OUTPATIENT
Start: 2025-02-18 | End: 2026-02-13

## 2025-02-20 ENCOUNTER — APPOINTMENT (OUTPATIENT)
Dept: PRIMARY CARE | Facility: CLINIC | Age: 65
End: 2025-02-20
Payer: COMMERCIAL

## 2025-02-24 PROCEDURE — RXMED WILLOW AMBULATORY MEDICATION CHARGE

## 2025-02-25 ENCOUNTER — PHARMACY VISIT (OUTPATIENT)
Dept: PHARMACY | Facility: CLINIC | Age: 65
End: 2025-02-25
Payer: COMMERCIAL

## 2025-03-04 ENCOUNTER — PATIENT OUTREACH (OUTPATIENT)
Dept: PRIMARY CARE | Facility: CLINIC | Age: 65
End: 2025-03-04
Payer: COMMERCIAL

## 2025-03-19 ENCOUNTER — PHARMACY VISIT (OUTPATIENT)
Dept: PHARMACY | Facility: CLINIC | Age: 65
End: 2025-03-19
Payer: MEDICAID

## 2025-03-19 PROCEDURE — RXMED WILLOW AMBULATORY MEDICATION CHARGE

## 2025-04-01 DIAGNOSIS — I10 HTN (HYPERTENSION), BENIGN: ICD-10-CM

## 2025-04-02 PROCEDURE — RXMED WILLOW AMBULATORY MEDICATION CHARGE

## 2025-04-02 RX ORDER — ISOSORBIDE MONONITRATE 30 MG/1
30 TABLET, EXTENDED RELEASE ORAL DAILY
Qty: 30 TABLET | Refills: 3 | Status: SHIPPED | OUTPATIENT
Start: 2025-04-02

## 2025-04-03 ENCOUNTER — PHARMACY VISIT (OUTPATIENT)
Dept: PHARMACY | Facility: CLINIC | Age: 65
End: 2025-04-03
Payer: MEDICAID

## 2025-04-03 PROCEDURE — RXMED WILLOW AMBULATORY MEDICATION CHARGE

## 2025-04-19 PROCEDURE — RXMED WILLOW AMBULATORY MEDICATION CHARGE

## 2025-04-20 ENCOUNTER — PHARMACY VISIT (OUTPATIENT)
Dept: PHARMACY | Facility: CLINIC | Age: 65
End: 2025-04-20
Payer: MEDICAID

## 2025-04-23 PROCEDURE — RXMED WILLOW AMBULATORY MEDICATION CHARGE

## 2025-04-24 ENCOUNTER — PHARMACY VISIT (OUTPATIENT)
Dept: PHARMACY | Facility: CLINIC | Age: 65
End: 2025-04-24
Payer: MEDICAID

## 2025-05-10 PROCEDURE — RXMED WILLOW AMBULATORY MEDICATION CHARGE

## 2025-05-11 ENCOUNTER — PHARMACY VISIT (OUTPATIENT)
Dept: PHARMACY | Facility: CLINIC | Age: 65
End: 2025-05-11
Payer: MEDICARE

## 2025-05-19 PROCEDURE — RXMED WILLOW AMBULATORY MEDICATION CHARGE

## 2025-05-21 ENCOUNTER — PHARMACY VISIT (OUTPATIENT)
Dept: PHARMACY | Facility: CLINIC | Age: 65
End: 2025-05-21
Payer: MEDICARE

## 2025-05-29 PROCEDURE — RXMED WILLOW AMBULATORY MEDICATION CHARGE

## 2025-05-30 DIAGNOSIS — M50.30 BULGING OF CERVICAL INTERVERTEBRAL DISC: ICD-10-CM

## 2025-05-30 PROCEDURE — RXMED WILLOW AMBULATORY MEDICATION CHARGE

## 2025-05-30 RX ORDER — TIZANIDINE 4 MG/1
4 TABLET ORAL EVERY 6 HOURS PRN
Qty: 30 TABLET | Refills: 3 | Status: SHIPPED | OUTPATIENT
Start: 2025-05-30 | End: 2025-06-29

## 2025-06-02 ENCOUNTER — PHARMACY VISIT (OUTPATIENT)
Dept: PHARMACY | Facility: CLINIC | Age: 65
End: 2025-06-02
Payer: MEDICARE

## 2025-06-09 PROCEDURE — RXMED WILLOW AMBULATORY MEDICATION CHARGE

## 2025-06-10 ENCOUNTER — PHARMACY VISIT (OUTPATIENT)
Dept: PHARMACY | Facility: CLINIC | Age: 65
End: 2025-06-10
Payer: MEDICARE

## 2025-06-19 PROCEDURE — RXMED WILLOW AMBULATORY MEDICATION CHARGE

## 2025-06-20 ENCOUNTER — PHARMACY VISIT (OUTPATIENT)
Dept: PHARMACY | Facility: CLINIC | Age: 65
End: 2025-06-20
Payer: MEDICARE

## 2025-06-26 PROCEDURE — RXMED WILLOW AMBULATORY MEDICATION CHARGE

## 2025-07-01 ENCOUNTER — PHARMACY VISIT (OUTPATIENT)
Dept: PHARMACY | Facility: CLINIC | Age: 65
End: 2025-07-01
Payer: MEDICARE

## 2025-07-11 PROCEDURE — RXMED WILLOW AMBULATORY MEDICATION CHARGE

## 2025-07-12 ENCOUNTER — PHARMACY VISIT (OUTPATIENT)
Dept: PHARMACY | Facility: CLINIC | Age: 65
End: 2025-07-12
Payer: MEDICARE

## 2025-07-19 PROCEDURE — RXMED WILLOW AMBULATORY MEDICATION CHARGE

## 2025-07-23 ENCOUNTER — PHARMACY VISIT (OUTPATIENT)
Dept: PHARMACY | Facility: CLINIC | Age: 65
End: 2025-07-23
Payer: MEDICARE

## 2025-07-23 PROCEDURE — RXMED WILLOW AMBULATORY MEDICATION CHARGE

## 2025-07-28 PROCEDURE — RXMED WILLOW AMBULATORY MEDICATION CHARGE

## 2025-07-29 ENCOUNTER — HOSPITAL ENCOUNTER (OUTPATIENT)
Dept: RADIOLOGY | Facility: HOSPITAL | Age: 65
Discharge: HOME | End: 2025-07-29
Payer: MEDICARE

## 2025-07-29 ENCOUNTER — PHARMACY VISIT (OUTPATIENT)
Dept: PHARMACY | Facility: CLINIC | Age: 65
End: 2025-07-29
Payer: MEDICARE

## 2025-07-29 DIAGNOSIS — R91.1 SOLITARY PULMONARY NODULE: ICD-10-CM

## 2025-07-29 PROCEDURE — 71250 CT THORAX DX C-: CPT

## 2025-07-29 PROCEDURE — 71250 CT THORAX DX C-: CPT | Performed by: RADIOLOGY

## 2025-08-14 PROCEDURE — RXMED WILLOW AMBULATORY MEDICATION CHARGE

## 2025-08-21 ENCOUNTER — PHARMACY VISIT (OUTPATIENT)
Dept: PHARMACY | Facility: CLINIC | Age: 65
End: 2025-08-21
Payer: MEDICARE

## 2025-08-21 PROCEDURE — RXMED WILLOW AMBULATORY MEDICATION CHARGE

## 2025-08-28 ENCOUNTER — PHARMACY VISIT (OUTPATIENT)
Dept: PHARMACY | Facility: CLINIC | Age: 65
End: 2025-08-28
Payer: MEDICARE

## 2025-08-28 DIAGNOSIS — G95.9 CERVICAL MYELOPATHY: ICD-10-CM

## 2025-08-28 DIAGNOSIS — M79.7 FIBROMYALGIA: ICD-10-CM

## 2025-08-28 PROCEDURE — RXMED WILLOW AMBULATORY MEDICATION CHARGE

## 2025-08-28 RX ORDER — GABAPENTIN 300 MG/1
300 CAPSULE ORAL 3 TIMES DAILY
Qty: 90 CAPSULE | Refills: 11 | OUTPATIENT
Start: 2025-08-28